# Patient Record
Sex: MALE | Race: WHITE | Employment: OTHER | URBAN - METROPOLITAN AREA
[De-identification: names, ages, dates, MRNs, and addresses within clinical notes are randomized per-mention and may not be internally consistent; named-entity substitution may affect disease eponyms.]

---

## 2017-01-13 ENCOUNTER — HOSPITAL ENCOUNTER (OUTPATIENT)
Dept: INFUSION THERAPY | Age: 77
Discharge: HOME | End: 2017-01-13
Payer: MEDICARE

## 2017-01-13 VITALS
SYSTOLIC BLOOD PRESSURE: 100 MMHG | OXYGEN SATURATION: 96 % | DIASTOLIC BLOOD PRESSURE: 56 MMHG | BODY MASS INDEX: 34.49 KG/M2 | RESPIRATION RATE: 18 BRPM | TEMPERATURE: 97.9 F | HEART RATE: 67 BPM | WEIGHT: 176.6 LBS

## 2017-01-13 DIAGNOSIS — D63.1 ANEMIA OF RENAL DISEASE: ICD-10-CM

## 2017-01-13 DIAGNOSIS — N18.9 ANEMIA OF RENAL DISEASE: ICD-10-CM

## 2017-01-13 DIAGNOSIS — E83.19 IRON OVERLOAD: ICD-10-CM

## 2017-01-13 LAB
BASOPHILS # BLD AUTO: 0 K/UL (ref 0–0.2)
BASOPHILS # BLD: 0 % (ref 0–2)
DIFFERENTIAL METHOD BLD: ABNORMAL
EOSINOPHIL # BLD: 0.2 K/UL (ref 0–0.8)
EOSINOPHIL NFR BLD: 2 % (ref 0.5–7.8)
ERYTHROCYTE [DISTWIDTH] IN BLOOD BY AUTOMATED COUNT: 13.5 % (ref 11.9–14.6)
FERRITIN SERPL-MCNC: 735 NG/ML (ref 8–388)
HCT VFR BLD AUTO: 31.8 % (ref 41.1–50.3)
HGB BLD-MCNC: 10.8 G/DL (ref 13.6–17.2)
LYMPHOCYTES # BLD AUTO: 18 % (ref 13–44)
LYMPHOCYTES # BLD: 1.4 K/UL (ref 0.5–4.6)
MCH RBC QN AUTO: 32.5 PG (ref 26.1–32.9)
MCHC RBC AUTO-ENTMCNC: 34 G/DL (ref 31.4–35)
MCV RBC AUTO: 95.8 FL (ref 79.6–97.8)
MONOCYTES # BLD: 0.6 K/UL (ref 0.1–1.3)
MONOCYTES NFR BLD AUTO: 7 % (ref 4–12)
NEUTS SEG # BLD: 5.4 K/UL (ref 1.7–8.2)
NEUTS SEG NFR BLD AUTO: 72 % (ref 43–78)
NRBC # BLD: 0 K/UL (ref 0–0.2)
PLATELET # BLD AUTO: 175 K/UL (ref 150–450)
PMV BLD AUTO: 9.7 FL (ref 10.8–14.1)
RBC # BLD AUTO: 3.32 M/UL (ref 4.23–5.67)
WBC # BLD AUTO: 7.5 K/UL (ref 4.3–11.1)

## 2017-01-13 PROCEDURE — 36415 COLL VENOUS BLD VENIPUNCTURE: CPT | Performed by: INTERNAL MEDICINE

## 2017-01-13 PROCEDURE — 85025 COMPLETE CBC W/AUTO DIFF WBC: CPT | Performed by: INTERNAL MEDICINE

## 2017-01-13 PROCEDURE — 82728 ASSAY OF FERRITIN: CPT | Performed by: INTERNAL MEDICINE

## 2017-01-13 PROCEDURE — 99195 PHLEBOTOMY: CPT

## 2017-01-13 NOTE — PROGRESS NOTES
Therapeutic Phlebotomy performed without difficulty. 450 ml of blood withdrawn. Patient tolerated well. Withdrawn blood disposed of in [employer]. Offered gingeraile as a snack. Denies lightheadedness or dizziness Patient discharged ambulatory   Instructed to notify physician of any problems, questions or concerns. Allowed opportunity for patient / family to ask questions. Verbalizes understanding. Next appointment is 03/23 at 1100 with JAYMIE.

## 2017-03-15 ENCOUNTER — HOSPITAL ENCOUNTER (OUTPATIENT)
Dept: CT IMAGING | Age: 77
Discharge: HOME | End: 2017-03-15
Attending: INTERNAL MEDICINE
Payer: MEDICARE

## 2017-03-15 VITALS
DIASTOLIC BLOOD PRESSURE: 71 MMHG | SYSTOLIC BLOOD PRESSURE: 142 MMHG | OXYGEN SATURATION: 96 % | HEART RATE: 68 BPM | RESPIRATION RATE: 14 BRPM

## 2017-03-15 DIAGNOSIS — D46.9 MYELODYSPLASTIC SYNDROME (HCC): ICD-10-CM

## 2017-03-15 LAB
BASOPHILS # BLD AUTO: 0 K/UL (ref 0–0.2)
BASOPHILS # BLD: 0 % (ref 0–2)
BONE MARROW PREP & W,BMA: NORMAL
DIFFERENTIAL METHOD BLD: ABNORMAL
EOSINOPHIL # BLD: 0.1 K/UL (ref 0–0.8)
EOSINOPHIL NFR BLD: 2 % (ref 0.5–7.8)
ERYTHROCYTE [DISTWIDTH] IN BLOOD BY AUTOMATED COUNT: 13.7 % (ref 11.9–14.6)
HCT VFR BLD AUTO: 30.1 % (ref 41.1–50.3)
HGB BLD-MCNC: 10.3 G/DL (ref 13.6–17.2)
IMM GRANULOCYTES # BLD: 0 K/UL (ref 0–0.5)
IMM GRANULOCYTES NFR BLD AUTO: 0.3 % (ref 0–5)
LYMPHOCYTES # BLD AUTO: 18 % (ref 13–44)
LYMPHOCYTES # BLD: 1.1 K/UL (ref 0.5–4.6)
MCH RBC QN AUTO: 32.6 PG (ref 26.1–32.9)
MCHC RBC AUTO-ENTMCNC: 34.2 G/DL (ref 31.4–35)
MCV RBC AUTO: 95.3 FL (ref 79.6–97.8)
MONOCYTES # BLD: 0.2 K/UL (ref 0.1–1.3)
MONOCYTES NFR BLD AUTO: 4 % (ref 4–12)
NEUTS SEG # BLD: 4.6 K/UL (ref 1.7–8.2)
NEUTS SEG NFR BLD AUTO: 76 % (ref 43–78)
PLATELET # BLD AUTO: 167 K/UL (ref 150–450)
PMV BLD AUTO: 9.9 FL (ref 10.8–14.1)
RBC # BLD AUTO: 3.16 M/UL (ref 4.23–5.67)
WBC # BLD AUTO: 6 K/UL (ref 4.3–11.1)

## 2017-03-15 PROCEDURE — 88185 FLOWCYTOMETRY/TC ADD-ON: CPT | Performed by: INTERNAL MEDICINE

## 2017-03-15 PROCEDURE — 88374 M/PHMTRC ALYS ISHQUANT/SEMIQ: CPT

## 2017-03-15 PROCEDURE — 88367 INSITU HYBRIDIZATION AUTO: CPT

## 2017-03-15 PROCEDURE — 88305 TISSUE EXAM BY PATHOLOGIST: CPT | Performed by: INTERNAL MEDICINE

## 2017-03-15 PROCEDURE — 88264 CHROMOSOME ANALYSIS 20-25: CPT

## 2017-03-15 PROCEDURE — 88237 TISSUE CULTURE BONE MARROW: CPT

## 2017-03-15 PROCEDURE — 36415 COLL VENOUS BLD VENIPUNCTURE: CPT | Performed by: INTERNAL MEDICINE

## 2017-03-15 PROCEDURE — 88312 SPECIAL STAINS GROUP 1: CPT | Performed by: INTERNAL MEDICINE

## 2017-03-15 PROCEDURE — 88342 IMHCHEM/IMCYTCHM 1ST ANTB: CPT

## 2017-03-15 PROCEDURE — 88313 SPECIAL STAINS GROUP 2: CPT | Performed by: INTERNAL MEDICINE

## 2017-03-15 PROCEDURE — 88311 DECALCIFY TISSUE: CPT | Performed by: INTERNAL MEDICINE

## 2017-03-15 PROCEDURE — 88341 IMHCHEM/IMCYTCHM EA ADD ANTB: CPT

## 2017-03-15 PROCEDURE — 85025 COMPLETE CBC W/AUTO DIFF WBC: CPT | Performed by: INTERNAL MEDICINE

## 2017-03-15 PROCEDURE — 88280 CHROMOSOME KARYOTYPE STUDY: CPT

## 2017-03-15 PROCEDURE — 38221 DX BONE MARROW BIOPSIES: CPT

## 2017-03-15 PROCEDURE — 88184 FLOWCYTOMETRY/ TC 1 MARKER: CPT | Performed by: INTERNAL MEDICINE

## 2017-03-15 PROCEDURE — 99152 MOD SED SAME PHYS/QHP 5/>YRS: CPT

## 2017-03-15 PROCEDURE — 74011250636 HC RX REV CODE- 250/636

## 2017-03-15 PROCEDURE — 88373 M/PHMTRC ALYS ISHQUANT/SEMIQ: CPT

## 2017-03-15 PROCEDURE — 74011000250 HC RX REV CODE- 250: Performed by: RADIOLOGY

## 2017-03-15 RX ORDER — LIDOCAINE HYDROCHLORIDE 20 MG/ML
2-20 INJECTION, SOLUTION INFILTRATION; PERINEURAL
Status: DISCONTINUED | OUTPATIENT
Start: 2017-03-15 | End: 2017-03-19 | Stop reason: HOSPADM

## 2017-03-15 RX ORDER — FENTANYL CITRATE 50 UG/ML
25-50 INJECTION, SOLUTION INTRAMUSCULAR; INTRAVENOUS
Status: DISCONTINUED | OUTPATIENT
Start: 2017-03-15 | End: 2017-03-19 | Stop reason: HOSPADM

## 2017-03-15 RX ORDER — MIDAZOLAM HYDROCHLORIDE 1 MG/ML
.5-2 INJECTION, SOLUTION INTRAMUSCULAR; INTRAVENOUS
Status: DISCONTINUED | OUTPATIENT
Start: 2017-03-15 | End: 2017-03-19 | Stop reason: HOSPADM

## 2017-03-15 RX ORDER — SODIUM CHLORIDE 9 MG/ML
150 INJECTION, SOLUTION INTRAVENOUS CONTINUOUS
Status: ACTIVE | OUTPATIENT
Start: 2017-03-15 | End: 2017-03-16

## 2017-03-15 RX ORDER — IBUPROFEN 600 MG/1
600 TABLET ORAL
Status: DISCONTINUED | OUTPATIENT
Start: 2017-03-15 | End: 2017-03-19 | Stop reason: HOSPADM

## 2017-03-15 RX ADMIN — FENTANYL CITRATE 50 MCG: 50 INJECTION, SOLUTION INTRAMUSCULAR; INTRAVENOUS at 13:57

## 2017-03-15 RX ADMIN — SODIUM BICARBONATE 2 ML: 0.2 INJECTION, SOLUTION INTRAVENOUS at 13:58

## 2017-03-15 RX ADMIN — MIDAZOLAM HYDROCHLORIDE 1 MG: 1 INJECTION, SOLUTION INTRAMUSCULAR; INTRAVENOUS at 13:50

## 2017-03-15 RX ADMIN — LIDOCAINE HYDROCHLORIDE 100 MG: 20 INJECTION, SOLUTION INFILTRATION; PERINEURAL at 13:58

## 2017-03-15 RX ADMIN — FENTANYL CITRATE 50 MCG: 50 INJECTION, SOLUTION INTRAMUSCULAR; INTRAVENOUS at 13:50

## 2017-03-15 RX ADMIN — MIDAZOLAM HYDROCHLORIDE 1 MG: 1 INJECTION, SOLUTION INTRAMUSCULAR; INTRAVENOUS at 13:57

## 2017-03-15 NOTE — IP AVS SNAPSHOT
[address] 
[phone] Patient: Clare Osborn MRN: [id number] You are allergic to the following Allergen Reactions Flagyl (Metronidazole) Other (comments) Pt states burning sensation all over body. Azithromycin Unknown (comments) Severe headache Gadolinium-Containing Contrast Media Other (comments) Severe pain Macrolide Antibiotics Rash Penicillins Rash Quinolones Rash Roxicodone (Oxycodone) Rash Ultram (Tramadol) Rash Recent Documentation Smoking Status Former Smoker Unresulted Labs Order Current Status CBC WITH AUTOMATED DIFF Collected (03/15/17 1413) Emergency Contacts Name Discharge Info Relation Home Work Mobile [address] [facility] CAREGIVER [3] Child [2] [phone] [facility] CAREGIVER [3] Friend [5] [phone] Gabriela Cochran   [phone] About your hospitalization You were admitted on:  March 15, 2017 You last received care in the:  D RADIOLOGY CT SCAN You were discharged on:  March 15, 2017 Unit phone number:  [phone] Why you were hospitalized Your primary diagnosis was:  Not on File Providers Seen During Your Hospitalizations Provider Role Specialty Primary office phone Catherine Mendieta MD Attending Provider Hematology and Oncology [phone] Your Primary Care Physician (PCP) Primary Care Physician Office Phone Office Fax Teddy Barboza [phone] Follow-up Information None Your Appointments Thursday March 23, 2017 11:00 AM EDT  
LAB with [address]  
[facility] OUTREACH INSURANCE [facility] Fátima Mendez [address] [place] [address]  
[phone] Thursday March 23, 2017 11:30 AM EDT Follow Up with Regino Barrios MD  
[facility] Hematology and Oncology [facility]) ADI/ Cedric Acharya [address] [facility] [address]  
[phone] Thursday March 23, 2017 12:00 PM EDT Infusion with Garland Woodard [facility]) [address] Koko Gimenez [phone] [facility] [address]  
[phone] [address] Current Discharge Medication List  
  
ASK your doctor about these medications Dose & Instructions Dispensing Information Comments Morning Noon Evening Bedtime  
 aspirin delayed-release 81 mg tablet Your last dose was: Your next dose is: Take  by mouth daily. Refills:  0  
     
   
   
   
  
 cholecalciferol 1,000 unit tablet Commonly known as:  VITAMIN D3 Your last dose was: Your next dose is: Take  by mouth daily. Refills:  0  
     
   
   
   
  
 cpap machine kit Your last dose was: Your next dose is:    
   
   
 by Does Not Apply route. 12cm Refills:  0  
     
   
   
   
  
 gabapentin 400 mg capsule Commonly known as:  NEURONTIN Your last dose was: Your next dose is:    
   
   
 1 po tid Quantity:  270 Cap Refills:  3  
     
   
   
   
  
 latanoprost 0.005 % ophthalmic solution Commonly known as:  Umpire Ridgel Your last dose was: Your next dose is:    
   
   
 Dose:  1 Drop Administer 1 Drop to both eyes nightly. Indications: OPEN ANGLE GLAUCOMA Refills:  0  
     
   
   
   
  
 lisinopril-hydroCHLOROthiazide 20-25 mg per tablet Commonly known as:  Scarlet Eaves Your last dose was: Your next dose is:    
   
   
 Dose:  1 Tab Take 1 Tab by mouth daily. Indications: HYPERTENSION Quantity:  90 Tab Refills:  3  
     
   
   
   
  
 meloxicam 15 mg tablet Commonly known as:  MOBIC Your last dose was: Your next dose is:    
   
   
 Dose:  15 mg Take 1 Tab by mouth daily. Quantity:  90 Tab Refills:  3  
     
   
   
   
  
 pramipexole 0.5 mg tablet Commonly known as:  MIRAPEX Your last dose was: Your next dose is:    
   
   
 1 po 5 times daily Quantity:  450 Tab Refills:  3  
     
   
   
   
  
 simvastatin 20 mg tablet Commonly known as:  ZOCOR Your last dose was: Your next dose is:    
   
   
 1 po qd Quantity:  90 Tab Refills:  3 Discharge Instructions [address] Department of Interventional Radiology [facility] 
[phone] Office 
[phone] Fax BIOPSY DISCHARGE INSTRUCTIONS General Instructions: A biopsy is the removal of a small piece of tissue for microscopic examination or testing. Healthy tissue can be obtained for the purpose of tissue-type matching for transplants. Unhealthy tissues are more commonly biopsied to diagnose disease. Lung Biopsy: A needle lung biopsy is performed when there is a mass discovered in the lung area. The most serious risk is infection, bleeding, and pneumothorax (a collapsed lung). Signs of pneumothorax include shortness of breath, rapid heart rate, and blueness of the skin. If any of these occur, call 911. Liver Biopsy: This test helps detect cancer, infections, and the cause of an enlargement of the liver or elevated liver enzymes. It also helps to diagnose a number of liver diseases. The pain associated with the procedure may be felt in the shoulder. The risks include internal bleeding, pneumothorax, and injury to the surrounding organs. Renal Biopsy: This procedure is sometimes done to evaluate a transplanted kidney.  It is also used to evaluate unexplained decrease in kidney function, blood, or protein in the urine. You may see bright red blood in the urine the first 24 hours after the test. If the bleeding lasts longer, you need to call your doctor. There is a risk of infection and bleeding into the muscle, which may cause soreness. Spinal Biopsy: This test is sometimes done in conjunction with other procedures. Your back will be sore, as we are taking a small sample of bone, which is slightly more difficult to sample than tissue. General Biopsy: 
   A mass can grow in any area of the body, and we are taking a specimen as ordered by your doctor. The risks are the same. They include bleeding, pain, and infection. Home Care Instructions: You may resume your regular diet and medication regimen. Do not drink alcohol, drive, or make any important legal decisions in the next 24 hours. Do not lift anything heavier than a gallon of milk until the soreness goes away. You may use over the counter acetaminophen or ibuprofen for the soreness. You may apply an ice pack to the affected area for 20-30 minutes at time for the first 24 hours. After that, you may apply a heat pack. Call If: You should call your Physician and/or the Radiology Nurse if you have any questions or concerns about the biopsy site. Call if you should have increased pain, fever, redness, drainage, or bleeding more than a small spot on the bandage. Follow-Up Instructions: Please see your ordering doctor as he/she has requested. To Reach Us: If you have any questions about your procedure, please call the Interventional Radiology department at [phone]. After business hours (5pm) and weekends, call the answering service at [phone] and ask for the Radiologist on call to be paged. Patient Signature: 
Date: 3/15/2017 Discharging Nurse: Sheri Gates RN Interventional Radiology General Nurse Discharge After general anesthesia or intravenous sedation, for 24 hours or while taking prescription Narcotics: · Limit your activities · Do not drive and operate hazardous machinery · Do not make important personal or business decisions · Do  not drink alcoholic beverages · If you have not urinated within 8 hours after discharge, please contact your surgeon on call. * Please give a list of your current medications to your Primary Care Provider. * Please update this list whenever your medications are discontinued, doses are 
   changed, or new medications (including over-the-counter products) are added. * Please carry medication information at all times in case of emergency situations. These are general instructions for a healthy lifestyle: No smoking/ No tobacco products/ Avoid exposure to second hand smoke Surgeon General's Warning:  Quitting smoking now greatly reduces serious risk to your health. Obesity, smoking, and sedentary lifestyle greatly increases your risk for illness A healthy diet, regular physical exercise & weight monitoring are important for maintaining a healthy lifestyle You may be retaining fluid if you have a history of heart failure or if you experience any of the following symptoms:  Weight gain of 3 pounds or more overnight or 5 pounds in a week, increased swelling in our hands or feet or shortness of breath while lying flat in bed. Please call your doctor as soon as you notice any of these symptoms; do not wait until your next office visit. Recognize signs and symptoms of STROKE: 
F-face looks uneven A-arms unable to move or move unevenly S-speech slurred or non-existent T-time-call 911 as soon as signs and symptoms begin-DO NOT go Back to bed or wait to see if you get better-TIME IS BRAIN. Discharge Orders None ACO Transitions of Care Introducing [facility] [address] offers a voluntary care coordination program to provide high quality service and care to [facility] fee-for-service beneficiaries. Maykel De Los Santos was designed to help you enhance your health and well-being through the following services: ? Transitions of Care  support for individuals who are transitioning from one care setting to another (example: Hospital to home). ? Chronic and Complex Care Coordination  support for individuals and caregivers of those with serious or chronic illnesses or with more than one chronic (ongoing) condition and those who take a number of different medications. If you meet specific medical criteria, a [address] [place] [address] Rd may call you directly to coordinate your care with your primary care physician and your other care providers. For questions about the [facility] programs, please, contact your physicians office. For general questions or additional information about Accountable Care Organizations: 
Please visit www.medicare.gov/acos. html or call 1-800-MEDICARE (9-840.757.7518) TTY users should call 1-460.331.8440. Introducing [facility] & HEALTH SERVICES! Dear Warner Jacobo: Thank you for requesting a [employer] account. Our records indicate that you already have an active [employer] account. You can access your account anytime at [email or web address] Did you know that you can access your hospital and ER discharge instructions at any time in [employer]? You can also review all of your test results from your hospital stay or ER visit. Additional Information If you have questions, please visit the Frequently Asked Questions section of the [employer] website at [email or web address] Remember, [employer] is NOT to be used for urgent needs. For medical emergencies, dial 911. Now available from your iPhone and Android! General Information Please provide this summary of care documentation to your next provider.  
  
  
    
    
 Patient Signature: ____________________________________________________________ Date:  ____________________________________________________________  
  
Jael Savanna Provider Signature:  ____________________________________________________________ Date:  ____________________________________________________________

## 2017-03-15 NOTE — PROGRESS NOTES
TRANSFER - OUT REPORT:    Verbal report given to Macrina BIRMINGHAM(name) on Artur Sandoval  being transferred to IR Recovery(unit) for routine progression of care       Report consisted of patients Situation, Background, Assessment and   Recommendations(SBAR). Information from the following report(s) SBAR, Procedure Summary and MAR was reviewed with the receiving nurse. Lines:   Peripheral IV 16/75/01 Left Cephalic (Active)       Peripheral IV 03/15/17 Arm (Active)        Opportunity for questions and clarification was provided.       Patient transported with:   Registered Nurse

## 2017-03-15 NOTE — H&P
Department of Interventional Radiology  [phone]    History and Physical    Patient:  Sameer Hale MRN:  [id number]  SSN:  [id number]    YOB: 1940  Age:  68 y.o. Sex:  male      Primary Care Provider:  Keoynna Zuniga MD  Referring Physician:  Luis Coley MD    Subjective:     Chief Complaint: biopsy    History of the Present Illness: The patient is a 68 y.o. male who presents for bone marrow biopsy. Anemia, MDS.  NPO. CPAP several nights a week, which he did bring with him. Past Medical History:   Diagnosis Date    Anemia     Glaucoma     HTN (hypertension)     Hypercholesterolemia     Iron overload     regular phlebotomy    Macular degeneration     Myelodysplastic syndrome (HCC)     Osteoarthritis of multiple joints     Peripheral neuropathy (HCC) 11/1/2010    RLS (restless legs syndrome)     Sleep apnea     Spondylolisthesis, acquired 12/5/2010     Past Surgical History:   Procedure Laterality Date    HX CARPAL TUNNEL RELEASE Right 2014    HX CERVICAL DISKECTOMY  5/2002    HX CERVICAL FUSION  2002 & 2011    cervical plate placement     HX CHOLECYSTECTOMY  4/17/14    HX COLONOSCOPY  2001/2008    HX LUMBAR LAMINECTOMY  9/2/10    lumbar laminectomy L4 x 2/ rods    HX ORTHOPAEDIC Right 1985    ankle fusion with hardware     HX ORTHOPAEDIC Left 2/14/2011    fusion of bones in foot    HX ORTHOPAEDIC  1/2013    rods in back     HX THORACIC LAMINECTOMY  4/2/09    T12, L2,L3    NEUROLOGICAL PROCEDURE UNLISTED  2013    T10-T12 fusion    TOTAL HIP ARTHROPLASTY Bilateral     R in 2003, l in 2014   [address] Left 12/8/2003    TOTAL KNEE ARTHROPLASTY Right 12/7/2009        Review of Systems:    Pertinent items are noted in the History of Present Illness. Current Outpatient Prescriptions   Medication Sig    pramipexole (MIRAPEX) 0.5 mg tablet 1 po 5 times daily    meloxicam (MOBIC) 15 mg tablet Take 1 Tab by mouth daily.     lisinopril-hydrochlorothiazide (ZESTORETIC) 20-25 mg per tablet Take 1 Tab by mouth daily. Indications: HYPERTENSION    simvastatin (ZOCOR) 20 mg tablet 1 po qd    gabapentin (NEURONTIN) 400 mg capsule 1 po tid    cholecalciferol (VITAMIN D3) 1,000 unit tablet Take  by mouth daily.  cpap machine kit by Does Not Apply route. 12cm    aspirin delayed-release 81 mg tablet Take  by mouth daily.  latanoprost (XALATAN) 0.005 % ophthalmic solution Administer 1 Drop to both eyes nightly. Indications: OPEN ANGLE GLAUCOMA     No current facility-administered medications for this encounter. Allergies   Allergen Reactions    Flagyl [Metronidazole] Other (comments)     Pt states burning sensation all over body.  Azithromycin Unknown (comments)     Severe headache    Gadolinium-Containing Contrast Media Other (comments)     Severe pain    Macrolide Antibiotics Rash    Penicillins Rash    Quinolones Rash    Roxicodone [Oxycodone] Rash    Ultram [Tramadol] Rash       Family History   Problem Relation Age of Onset    Heart Disease Mother      carotid enarterectomy    Cancer Mother      hx of lymph node    Arthritis-osteo Brother     Lung Disease Brother      COPD    Cancer Father      prostate     Social History   Substance Use Topics    Smoking status: Former Smoker     Packs/day: 1.00     Years: 8.00     Quit date: 1/1/1967    Smokeless tobacco: Never Used    Alcohol use 7.0 oz/week     14 Cans of beer per week      Comment: week        Objective:       Physical Examination:    There were no vitals filed for this visit. There were no vitals taken for this visit.   HEART: regular rate and rhythm  LUNG: clear to auscultation bilaterally  ABDOMEN: normal findings: soft, non-tender  EXTREMITIES: warm, no edema    Laboratory:     Lab Results   Component Value Date/Time    Sodium 139 01/11/2017 10:58 AM    Sodium 136 10/31/2016 02:16 PM    Potassium 4.9 01/11/2017 10:58 AM    Potassium 3.9 10/31/2016 02:16 PM    Chloride 98 01/11/2017 10:58 AM    Chloride 105 10/31/2016 02:16 PM    CO2 23 01/11/2017 10:58 AM    CO2 28 10/31/2016 02:16 PM    Anion gap 3 10/31/2016 02:16 PM    Anion gap 6 07/29/2016 10:47 AM    Glucose 104 01/11/2017 10:58 AM    Glucose 126 10/31/2016 02:16 PM    BUN 26 01/11/2017 10:58 AM    BUN 25 10/31/2016 02:16 PM    Creatinine 0.95 01/11/2017 10:58 AM    Creatinine 0.94 10/31/2016 02:16 PM    GFR est AA 90 01/11/2017 10:58 AM    GFR est AA >60 10/31/2016 02:16 PM    GFR est non-AA 77 01/11/2017 10:58 AM    GFR est non-AA >60 10/31/2016 02:16 PM    Calcium 9.5 01/11/2017 10:58 AM    Calcium 9.1 10/31/2016 02:16 PM    Magnesium 2.0 10/31/2016 02:16 PM    Magnesium 2.1 07/29/2016 10:47 AM    Phosphorus 3.2 02/23/2015 10:45 AM    Phosphorus 3.3 09/09/2014 11:00 AM    Albumin 4.8 01/11/2017 10:58 AM    Albumin 4.2 10/31/2016 02:16 PM    Protein, total 6.9 01/11/2017 10:58 AM    Protein, total 7.1 10/31/2016 02:16 PM    Globulin 2.9 10/31/2016 02:16 PM    Globulin 2.9 07/29/2016 10:47 AM    A-G Ratio 2.3 01/11/2017 10:58 AM    A-G Ratio 1.4 10/31/2016 02:16 PM    AST (SGOT) 29 01/11/2017 10:58 AM    AST (SGOT) 23 10/31/2016 02:16 PM    ALT (SGPT) 24 01/11/2017 10:58 AM    ALT (SGPT) 30 10/31/2016 02:16 PM     Lab Results   Component Value Date/Time    WBC 7.5 01/13/2017 09:53 AM    WBC 6.4 01/11/2017 09:23 AM    HGB 10.8 01/13/2017 09:53 AM    HGB 11.3 01/11/2017 09:23 AM    HCT 31.8 01/13/2017 09:53 AM    HCT 33.2 01/11/2017 09:23 AM    PLATELET 594 42/96/1165 09:53 AM    PLATELET 394 80/74/4724 09:23 AM     Lab Results   Component Value Date/Time    aPTT 31.7 07/02/2014 01:45 PM    aPTT 27.5 11/09/2009 10:45 AM    Prothrombin time 10.9 07/02/2014 01:45 PM    Prothrombin time 11.3 12/10/2009 04:55 AM    INR 1.0 07/02/2014 01:45 PM    INR 1.1 12/10/2009 04:55 AM       Assessment:     Anemia, MDS    Plan:     Planned Procedure:  Bone marrow biopsy    Risks, benefits, and alternatives reviewed with patient and he agrees to proceed with the procedure.       Signed By: Modesto Ríos PA-C     March 15, 2017

## 2017-03-15 NOTE — DISCHARGE INSTRUCTIONS
[address]  Department of Interventional Radiology  [facility]  [phone] Office  [phone] Fax    BIOPSY DISCHARGE INSTRUCTIONS    General Instructions:     A biopsy is the removal of a small piece of tissue for microscopic examination or testing. Healthy tissue can be obtained for the purpose of tissue-type matching for transplants. Unhealthy tissues are more commonly biopsied to diagnose disease. Lung Biopsy:     A needle lung biopsy is performed when there is a mass discovered in the lung area. The most serious risk is infection, bleeding, and pneumothorax (a collapsed lung). Signs of pneumothorax include shortness of breath, rapid heart rate, and blueness of the skin. If any of these occur, call 911. Liver Biopsy: This test helps detect cancer, infections, and the cause of an enlargement of the liver or elevated liver enzymes. It also helps to diagnose a number of liver diseases. The pain associated with the procedure may be felt in the shoulder. The risks include internal bleeding, pneumothorax, and injury to the surrounding organs. Renal Biopsy: This procedure is sometimes done to evaluate a transplanted kidney. It is also used to evaluate unexplained decrease in kidney function, blood, or protein in the urine. You may see bright red blood in the urine the first 24 hours after the test. If the bleeding lasts longer, you need to call your doctor. There is a risk of infection and bleeding into the muscle, which may cause soreness. Spinal Biopsy: This test is sometimes done in conjunction with other procedures. Your back will be sore, as we are taking a small sample of bone, which is slightly more difficult to sample than tissue. General Biopsy:     A mass can grow in any area of the body, and we are taking a specimen as ordered by your doctor. The risks are the same.  They include bleeding, pain, and infection. Home Care Instructions: You may resume your regular diet and medication regimen. Do not drink alcohol, drive, or make any important legal decisions in the next 24 hours. Do not lift anything heavier than a gallon of milk until the soreness goes away. You may use over the counter acetaminophen or ibuprofen for the soreness. You may apply an ice pack to the affected area for 20-30 minutes at time for the first 24 hours. After that, you may apply a heat pack. Call If: You should call your Physician and/or the Radiology Nurse if you have any questions or concerns about the biopsy site. Call if you should have increased pain, fever, redness, drainage, or bleeding more than a small spot on the bandage. Follow-Up Instructions: Please see your ordering doctor as he/she has requested. To Reach Us: If you have any questions about your procedure, please call the Interventional Radiology department at [phone]. After business hours (5pm) and weekends, call the answering service at [phone] and ask for the Radiologist on call to be paged. Patient Signature:  Date: 3/15/2017  Discharging Nurse: Allison Green RN      Interventional Radiology General Nurse Discharge    After general anesthesia or intravenous sedation, for 24 hours or while taking prescription Narcotics:  · Limit your activities  · Do not drive and operate hazardous machinery  · Do not make important personal or business decisions  · Do  not drink alcoholic beverages  · If you have not urinated within 8 hours after discharge, please contact your surgeon on call. * Please give a list of your current medications to your Primary Care Provider. * Please update this list whenever your medications are discontinued, doses are     changed, or new medications (including over-the-counter products) are added. * Please carry medication information at all times in case of emergency situations.     These are general instructions for a healthy lifestyle:    No smoking/ No tobacco products/ Avoid exposure to second hand smoke  Surgeon General's Warning:  Quitting smoking now greatly reduces serious risk to your health. Obesity, smoking, and sedentary lifestyle greatly increases your risk for illness  A healthy diet, regular physical exercise & weight monitoring are important for maintaining a healthy lifestyle    You may be retaining fluid if you have a history of heart failure or if you experience any of the following symptoms:  Weight gain of 3 pounds or more overnight or 5 pounds in a week, increased swelling in our hands or feet or shortness of breath while lying flat in bed. Please call your doctor as soon as you notice any of these symptoms; do not wait until your next office visit. Recognize signs and symptoms of STROKE:  F-face looks uneven    A-arms unable to move or move unevenly    S-speech slurred or non-existent    T-time-call 911 as soon as signs and symptoms begin-DO NOT go       Back to bed or wait to see if you get better-TIME IS BRAIN.

## 2017-03-15 NOTE — PROGRESS NOTES
Patient to CT room for procedure. Patient awake and alert, verbalized name, , and procedure to be performed. Patient moved to procedure table independently.

## 2017-03-15 NOTE — PROCEDURES
Interventional Radiology Brief Procedure Note    Patient: Celsa Perry MRN: [id number]  SSN: [id number]    YOB: 1940  Age: 68 y.o. Sex: male      Date of Procedure: 3/15/2017    Pre-Procedure Diagnosis: Myelodysplastic syndrome. Hemochromatosis. Post-Procedure Diagnosis: SAME    Procedure(s): Image Guided Biopsy--right iliac bone marrow aspiration and biopsy    Brief Description of Procedure: as above    Performed By: Cain Triana MD     Assistants: None    Anesthesia: Moderate Sedation    Estimated Blood Loss: Less than 10ml    Specimens: Pathology    Implants: None    Findings: Sclerotic bone. Complications: None    Recommendations: 1 hour bedrest.       Follow Up: Dr Canales Jurjozef.     Signed By: Cain Triana MD     March 15, 2017

## 2017-03-15 NOTE — PROGRESS NOTES
Recovery period without difficulty. Pt alert and oriented and denies pain. Dressing is clean, dry, and intact. Reviewed discharge instructions with patient and friend, both verbalized understanding. [facility] Vital signs and Hermelindo score completed.

## 2017-03-23 ENCOUNTER — HOSPITAL ENCOUNTER (OUTPATIENT)
Dept: LAB | Age: 77
Discharge: HOME | End: 2017-03-23
Payer: MEDICARE

## 2017-03-23 ENCOUNTER — HOSPITAL ENCOUNTER (OUTPATIENT)
Dept: INFUSION THERAPY | Age: 77
Discharge: HOME | End: 2017-03-23
Payer: MEDICARE

## 2017-03-23 VITALS
OXYGEN SATURATION: 94 % | TEMPERATURE: 97.4 F | DIASTOLIC BLOOD PRESSURE: 68 MMHG | RESPIRATION RATE: 18 BRPM | HEART RATE: 67 BPM | SYSTOLIC BLOOD PRESSURE: 121 MMHG

## 2017-03-23 DIAGNOSIS — E83.19 IRON OVERLOAD: ICD-10-CM

## 2017-03-23 DIAGNOSIS — D46.9 MYELODYSPLASTIC SYNDROME (HCC): ICD-10-CM

## 2017-03-23 LAB
ALBUMIN SERPL BCP-MCNC: 3.9 G/DL (ref 3.2–4.6)
ALBUMIN/GLOB SERPL: 1.4 {RATIO} (ref 1.2–3.5)
ALP SERPL-CCNC: 81 U/L (ref 50–136)
ALT SERPL-CCNC: 26 U/L (ref 12–65)
ANION GAP BLD CALC-SCNC: 8 MMOL/L (ref 7–16)
AST SERPL W P-5'-P-CCNC: 18 U/L (ref 15–37)
BASOPHILS # BLD AUTO: 0 K/UL (ref 0–0.2)
BASOPHILS # BLD: 0 % (ref 0–2)
BILIRUB SERPL-MCNC: 0.3 MG/DL (ref 0.2–1.1)
BUN SERPL-MCNC: 24 MG/DL (ref 8–23)
CALCIUM SERPL-MCNC: 8.9 MG/DL (ref 8.3–10.4)
CHLORIDE SERPL-SCNC: 101 MMOL/L (ref 98–107)
CO2 SERPL-SCNC: 27 MMOL/L (ref 23–32)
CREAT SERPL-MCNC: 0.95 MG/DL (ref 0.8–1.5)
DIFFERENTIAL METHOD BLD: ABNORMAL
EOSINOPHIL # BLD: 0.1 K/UL (ref 0–0.8)
EOSINOPHIL NFR BLD: 2 % (ref 0.5–7.8)
ERYTHROCYTE [DISTWIDTH] IN BLOOD BY AUTOMATED COUNT: 13.4 % (ref 11.9–14.6)
FERRITIN SERPL-MCNC: 714 NG/ML (ref 8–388)
GLOBULIN SER CALC-MCNC: 2.8 G/DL (ref 2.3–3.5)
GLUCOSE SERPL-MCNC: 109 MG/DL (ref 65–100)
HCT VFR BLD AUTO: 29.9 % (ref 41.1–50.3)
HGB BLD-MCNC: 10.4 G/DL (ref 13.6–17.2)
LYMPHOCYTES # BLD AUTO: 19 % (ref 13–44)
LYMPHOCYTES # BLD: 1 K/UL (ref 0.5–4.6)
MCH RBC QN AUTO: 33.4 PG (ref 26.1–32.9)
MCHC RBC AUTO-ENTMCNC: 34.8 G/DL (ref 31.4–35)
MCV RBC AUTO: 96.1 FL (ref 79.6–97.8)
MONOCYTES # BLD: 0.4 K/UL (ref 0.1–1.3)
MONOCYTES NFR BLD AUTO: 7 % (ref 4–12)
NEUTS SEG # BLD: 3.7 K/UL (ref 1.7–8.2)
NEUTS SEG NFR BLD AUTO: 72 % (ref 43–78)
NRBC # BLD: 0 K/UL (ref 0–0.2)
PLATELET # BLD AUTO: 162 K/UL (ref 150–450)
PMV BLD AUTO: 10.1 FL (ref 10.8–14.1)
POTASSIUM SERPL-SCNC: 4.1 MMOL/L (ref 3.5–5.1)
PROT SERPL-MCNC: 6.7 G/DL (ref 6.3–8.2)
RBC # BLD AUTO: 3.11 M/UL (ref 4.23–5.67)
SODIUM SERPL-SCNC: 136 MMOL/L (ref 136–145)
WBC # BLD AUTO: 5.2 K/UL (ref 4.3–11.1)

## 2017-03-23 PROCEDURE — 99195 PHLEBOTOMY: CPT

## 2017-03-23 NOTE — PROGRESS NOTES
Arrived to the [facility]. Therapeutic Phlebotomy completed. Patient tolerated well. Any issues or concerns during appointment: Patient's hgb 10.4. Spoke with Jami Pereira RN to Dr. Cristy Saavedra, and per Dr. Cristy Saavedra OK to proceed with therapeutic phlebotomy today with current hgb. Pt made aware and encouraged to call with any new symptoms at home. VSS. Patient aware of next infusion appointment on 5/18/17 at 1500. Discharged ambulatory.

## 2017-05-18 ENCOUNTER — HOSPITAL ENCOUNTER (OUTPATIENT)
Dept: INFUSION THERAPY | Age: 77
Discharge: HOME | End: 2017-05-18
Payer: MEDICARE

## 2017-05-18 VITALS
SYSTOLIC BLOOD PRESSURE: 121 MMHG | WEIGHT: 171.4 LBS | TEMPERATURE: 99 F | DIASTOLIC BLOOD PRESSURE: 64 MMHG | RESPIRATION RATE: 18 BRPM | BODY MASS INDEX: 35.21 KG/M2 | HEART RATE: 77 BPM | OXYGEN SATURATION: 95 %

## 2017-05-18 DIAGNOSIS — E83.19 IRON OVERLOAD: ICD-10-CM

## 2017-05-18 DIAGNOSIS — D63.1 ANEMIA OF RENAL DISEASE: ICD-10-CM

## 2017-05-18 DIAGNOSIS — N18.9 ANEMIA OF RENAL DISEASE: ICD-10-CM

## 2017-05-18 LAB
BASOPHILS # BLD AUTO: 0 K/UL (ref 0–0.2)
BASOPHILS # BLD: 0 % (ref 0–2)
DIFFERENTIAL METHOD BLD: ABNORMAL
EOSINOPHIL # BLD: 0.1 K/UL (ref 0–0.8)
EOSINOPHIL NFR BLD: 2 % (ref 0.5–7.8)
ERYTHROCYTE [DISTWIDTH] IN BLOOD BY AUTOMATED COUNT: 13.3 % (ref 11.9–14.6)
FERRITIN SERPL-MCNC: 599 NG/ML (ref 8–388)
HCT VFR BLD AUTO: 29.3 % (ref 41.1–50.3)
HGB BLD-MCNC: 10.2 G/DL (ref 13.6–17.2)
LYMPHOCYTES # BLD AUTO: 18 % (ref 13–44)
LYMPHOCYTES # BLD: 1.1 K/UL (ref 0.5–4.6)
MCH RBC QN AUTO: 33.1 PG (ref 26.1–32.9)
MCHC RBC AUTO-ENTMCNC: 34.8 G/DL (ref 31.4–35)
MCV RBC AUTO: 95.1 FL (ref 79.6–97.8)
MONOCYTES # BLD: 0.5 K/UL (ref 0.1–1.3)
MONOCYTES NFR BLD AUTO: 8 % (ref 4–12)
NEUTS SEG # BLD: 4.6 K/UL (ref 1.7–8.2)
NEUTS SEG NFR BLD AUTO: 73 % (ref 43–78)
NRBC # BLD: 0.01 K/UL (ref 0–0.2)
PLATELET # BLD AUTO: 178 K/UL (ref 150–450)
PMV BLD AUTO: 10.1 FL (ref 10.8–14.1)
RBC # BLD AUTO: 3.08 M/UL (ref 4.23–5.67)
WBC # BLD AUTO: 6.3 K/UL (ref 4.3–11.1)

## 2017-05-18 PROCEDURE — 82728 ASSAY OF FERRITIN: CPT | Performed by: INTERNAL MEDICINE

## 2017-05-18 PROCEDURE — 85025 COMPLETE CBC W/AUTO DIFF WBC: CPT | Performed by: INTERNAL MEDICINE

## 2017-05-18 PROCEDURE — 99211 OFF/OP EST MAY X REQ PHY/QHP: CPT

## 2017-05-18 NOTE — PROGRESS NOTES
Pt orthostatic upon arrival, labs drawn peripherally,recehcked VS when labs were resulted,  notified MD of VS and results, verbal order to hold phleb.  Today for hgb 10.2  Encouraged pt to drink plenty of fluids, verbalized understanding, to follow up with MD in July, discharged ambulatory

## 2017-07-11 ENCOUNTER — HOSPITAL ENCOUNTER (OUTPATIENT)
Dept: INFUSION THERAPY | Age: 77
Discharge: HOME | End: 2017-07-11
Payer: MEDICARE

## 2017-07-11 ENCOUNTER — HOSPITAL ENCOUNTER (OUTPATIENT)
Dept: LAB | Age: 77
Discharge: HOME | End: 2017-07-11
Payer: MEDICARE

## 2017-07-11 VITALS
DIASTOLIC BLOOD PRESSURE: 58 MMHG | RESPIRATION RATE: 18 BRPM | HEART RATE: 68 BPM | SYSTOLIC BLOOD PRESSURE: 122 MMHG | OXYGEN SATURATION: 97 % | TEMPERATURE: 98.2 F

## 2017-07-11 DIAGNOSIS — D46.9 MYELODYSPLASTIC SYNDROME (HCC): ICD-10-CM

## 2017-07-11 DIAGNOSIS — E83.19 IRON OVERLOAD: ICD-10-CM

## 2017-07-11 LAB
ALBUMIN SERPL BCP-MCNC: 4.1 G/DL (ref 3.2–4.6)
ALBUMIN/GLOB SERPL: 1.4 {RATIO} (ref 1.2–3.5)
ALP SERPL-CCNC: 80 U/L (ref 50–136)
ALT SERPL-CCNC: 30 U/L (ref 12–65)
ANION GAP BLD CALC-SCNC: 8 MMOL/L (ref 7–16)
AST SERPL W P-5'-P-CCNC: 24 U/L (ref 15–37)
BASOPHILS # BLD AUTO: 0 K/UL (ref 0–0.2)
BASOPHILS # BLD: 0 % (ref 0–2)
BILIRUB SERPL-MCNC: 0.4 MG/DL (ref 0.2–1.1)
BUN SERPL-MCNC: 20 MG/DL (ref 8–23)
CALCIUM SERPL-MCNC: 9 MG/DL (ref 8.3–10.4)
CHLORIDE SERPL-SCNC: 104 MMOL/L (ref 98–107)
CO2 SERPL-SCNC: 26 MMOL/L (ref 21–32)
CREAT SERPL-MCNC: 0.98 MG/DL (ref 0.8–1.5)
DIFFERENTIAL METHOD BLD: ABNORMAL
EOSINOPHIL # BLD: 0.2 K/UL (ref 0–0.8)
EOSINOPHIL NFR BLD: 2 % (ref 0.5–7.8)
ERYTHROCYTE [DISTWIDTH] IN BLOOD BY AUTOMATED COUNT: 13.4 % (ref 11.9–14.6)
FERRITIN SERPL-MCNC: 583 NG/ML (ref 8–388)
GLOBULIN SER CALC-MCNC: 2.9 G/DL (ref 2.3–3.5)
GLUCOSE SERPL-MCNC: 100 MG/DL (ref 65–100)
HCT VFR BLD AUTO: 30.9 % (ref 41.1–50.3)
HGB BLD-MCNC: 10.6 G/DL (ref 13.6–17.2)
LYMPHOCYTES # BLD AUTO: 13 % (ref 13–44)
LYMPHOCYTES # BLD: 0.9 K/UL (ref 0.5–4.6)
MCH RBC QN AUTO: 32.8 PG (ref 26.1–32.9)
MCHC RBC AUTO-ENTMCNC: 34.3 G/DL (ref 31.4–35)
MCV RBC AUTO: 95.7 FL (ref 79.6–97.8)
MONOCYTES # BLD: 0.6 K/UL (ref 0.1–1.3)
MONOCYTES NFR BLD AUTO: 8 % (ref 4–12)
NEUTS SEG # BLD: 5.4 K/UL (ref 1.7–8.2)
NEUTS SEG NFR BLD AUTO: 77 % (ref 43–78)
NRBC # BLD: 0 K/UL (ref 0–0.2)
PLATELET # BLD AUTO: 172 K/UL (ref 150–450)
PMV BLD AUTO: 10 FL (ref 10.8–14.1)
POTASSIUM SERPL-SCNC: 4.2 MMOL/L (ref 3.5–5.1)
PROT SERPL-MCNC: 7 G/DL (ref 6.3–8.2)
RBC # BLD AUTO: 3.23 M/UL (ref 4.23–5.67)
SODIUM SERPL-SCNC: 138 MMOL/L (ref 136–145)
WBC # BLD AUTO: 7.1 K/UL (ref 4.3–11.1)

## 2017-07-11 PROCEDURE — 99195 PHLEBOTOMY: CPT

## 2017-07-11 NOTE — PROGRESS NOTES
Pt arrived ambulatory after OV for phlebotomy, he denies complaints. Phlebotomy set used, IV access to right forearm obtained. Pt tolerated procedure without incident. Pt dc'd stable after 30 minute obs, to return to [facility] on 9/11 at 1115.

## 2017-09-08 ENCOUNTER — HOSPITAL ENCOUNTER (OUTPATIENT)
Dept: INFUSION THERAPY | Age: 77
Discharge: HOME | End: 2017-09-08
Payer: MEDICARE

## 2017-09-08 VITALS — DIASTOLIC BLOOD PRESSURE: 75 MMHG | SYSTOLIC BLOOD PRESSURE: 137 MMHG | HEART RATE: 70 BPM

## 2017-09-08 DIAGNOSIS — N18.9 ANEMIA OF RENAL DISEASE: ICD-10-CM

## 2017-09-08 DIAGNOSIS — D63.1 ANEMIA OF RENAL DISEASE: ICD-10-CM

## 2017-09-08 DIAGNOSIS — E83.19 IRON OVERLOAD: ICD-10-CM

## 2017-09-08 LAB
FERRITIN SERPL-MCNC: 502 NG/ML (ref 8–388)
HGB BLD-MCNC: 10.6 G/DL (ref 13.6–17.2)

## 2017-09-08 PROCEDURE — 99195 PHLEBOTOMY: CPT

## 2017-09-08 PROCEDURE — 82728 ASSAY OF FERRITIN: CPT | Performed by: INTERNAL MEDICINE

## 2017-09-08 PROCEDURE — 36415 COLL VENOUS BLD VENIPUNCTURE: CPT | Performed by: INTERNAL MEDICINE

## 2017-09-08 PROCEDURE — 85018 HEMOGLOBIN: CPT | Performed by: INTERNAL MEDICINE

## 2017-09-08 NOTE — PROGRESS NOTES
Arrived to the [facility]. Therapeutic phlebotomy completed. Patient tolerated well. Any issues or concerns during appointment: none. Patient aware of next infusion appointment on 11/13/17 @ 1083. Discharged home.

## 2017-09-11 ENCOUNTER — APPOINTMENT (OUTPATIENT)
Dept: INFUSION THERAPY | Age: 77
End: 2017-09-11

## 2017-11-13 ENCOUNTER — HOSPITAL ENCOUNTER (OUTPATIENT)
Dept: INFUSION THERAPY | Age: 77
Discharge: HOME | End: 2017-11-13
Payer: MEDICARE

## 2017-11-13 ENCOUNTER — HOSPITAL ENCOUNTER (OUTPATIENT)
Dept: LAB | Age: 77
Discharge: HOME | End: 2017-11-13
Payer: MEDICARE

## 2017-11-13 VITALS
DIASTOLIC BLOOD PRESSURE: 62 MMHG | SYSTOLIC BLOOD PRESSURE: 110 MMHG | HEART RATE: 70 BPM | RESPIRATION RATE: 18 BRPM | OXYGEN SATURATION: 98 %

## 2017-11-13 DIAGNOSIS — N18.9 ANEMIA OF RENAL DISEASE: ICD-10-CM

## 2017-11-13 DIAGNOSIS — D63.1 ANEMIA OF RENAL DISEASE: ICD-10-CM

## 2017-11-13 DIAGNOSIS — E83.19 IRON OVERLOAD: ICD-10-CM

## 2017-11-13 DIAGNOSIS — D46.9 MYELODYSPLASTIC SYNDROME (HCC): ICD-10-CM

## 2017-11-13 LAB
ALBUMIN SERPL-MCNC: 4.1 G/DL (ref 3.2–4.6)
ALBUMIN/GLOB SERPL: 1.5 {RATIO} (ref 1.2–3.5)
ALP SERPL-CCNC: 88 U/L (ref 50–136)
ALT SERPL-CCNC: 31 U/L (ref 12–65)
ANION GAP SERPL CALC-SCNC: 7 MMOL/L (ref 7–16)
AST SERPL-CCNC: 20 U/L (ref 15–37)
BASOPHILS # BLD: 0 K/UL (ref 0–0.2)
BASOPHILS NFR BLD: 0 % (ref 0–2)
BILIRUB SERPL-MCNC: 0.4 MG/DL (ref 0.2–1.1)
BUN SERPL-MCNC: 32 MG/DL (ref 8–23)
CALCIUM SERPL-MCNC: 9.3 MG/DL (ref 8.3–10.4)
CHLORIDE SERPL-SCNC: 103 MMOL/L (ref 98–107)
CO2 SERPL-SCNC: 28 MMOL/L (ref 21–32)
CREAT SERPL-MCNC: 1.03 MG/DL (ref 0.8–1.5)
DIFFERENTIAL METHOD BLD: ABNORMAL
EOSINOPHIL # BLD: 0.1 K/UL (ref 0–0.8)
EOSINOPHIL NFR BLD: 2 % (ref 0.5–7.8)
ERYTHROCYTE [DISTWIDTH] IN BLOOD BY AUTOMATED COUNT: 13.6 % (ref 11.9–14.6)
FERRITIN SERPL-MCNC: 454 NG/ML (ref 8–388)
GLOBULIN SER CALC-MCNC: 2.7 G/DL (ref 2.3–3.5)
GLUCOSE SERPL-MCNC: 107 MG/DL (ref 65–100)
HCT VFR BLD AUTO: 30.4 % (ref 41.1–50.3)
HGB BLD-MCNC: 10.5 G/DL (ref 13.6–17.2)
LYMPHOCYTES # BLD: 1.1 K/UL (ref 0.5–4.6)
LYMPHOCYTES NFR BLD: 17 % (ref 13–44)
MCH RBC QN AUTO: 32.9 PG (ref 26.1–32.9)
MCHC RBC AUTO-ENTMCNC: 34.5 G/DL (ref 31.4–35)
MCV RBC AUTO: 95.3 FL (ref 79.6–97.8)
MONOCYTES # BLD: 0.6 K/UL (ref 0.1–1.3)
MONOCYTES NFR BLD: 9 % (ref 4–12)
NEUTS SEG # BLD: 4.7 K/UL (ref 1.7–8.2)
NEUTS SEG NFR BLD: 72 % (ref 43–78)
NRBC # BLD: 0.01 K/UL (ref 0–0.2)
NRBC BLD-RTO: 0.2 PER 100 WBC (ref 0–2)
PLATELET # BLD AUTO: 174 K/UL (ref 150–450)
PMV BLD AUTO: 9.8 FL (ref 10.8–14.1)
POTASSIUM SERPL-SCNC: 4.7 MMOL/L (ref 3.5–5.1)
PROT SERPL-MCNC: 6.8 G/DL (ref 6.3–8.2)
RBC # BLD AUTO: 3.19 M/UL (ref 4.23–5.67)
SODIUM SERPL-SCNC: 138 MMOL/L (ref 136–145)
WBC # BLD AUTO: 6.5 K/UL (ref 4.3–11.1)

## 2017-11-13 PROCEDURE — 99195 PHLEBOTOMY: CPT

## 2017-11-13 NOTE — PROGRESS NOTES
Arrived to the [facility]. Therapeutic phlebotomy completed. Patient tolerated well. Any issues or concerns during appointment: none. Patient aware of next infusion appointment on 1/15/18 at 0800. Discharged ambulatory.     Helena Crowley RN

## 2018-01-15 ENCOUNTER — HOSPITAL ENCOUNTER (OUTPATIENT)
Dept: INFUSION THERAPY | Age: 78
Discharge: HOME | End: 2018-01-15
Payer: MEDICARE

## 2018-01-15 VITALS
HEART RATE: 79 BPM | RESPIRATION RATE: 18 BRPM | SYSTOLIC BLOOD PRESSURE: 155 MMHG | DIASTOLIC BLOOD PRESSURE: 82 MMHG | TEMPERATURE: 97.7 F | OXYGEN SATURATION: 94 %

## 2018-01-15 DIAGNOSIS — N18.9 ANEMIA OF RENAL DISEASE: ICD-10-CM

## 2018-01-15 DIAGNOSIS — E83.19 IRON OVERLOAD: ICD-10-CM

## 2018-01-15 DIAGNOSIS — D63.1 ANEMIA OF RENAL DISEASE: ICD-10-CM

## 2018-01-15 LAB
BASOPHILS # BLD: 0 K/UL (ref 0–0.2)
BASOPHILS NFR BLD: 0 % (ref 0–2)
DIFFERENTIAL METHOD BLD: ABNORMAL
EOSINOPHIL # BLD: 0.1 K/UL (ref 0–0.8)
EOSINOPHIL NFR BLD: 2 % (ref 0.5–7.8)
ERYTHROCYTE [DISTWIDTH] IN BLOOD BY AUTOMATED COUNT: 13.8 % (ref 11.9–14.6)
FERRITIN SERPL-MCNC: 282 NG/ML (ref 8–388)
HCT VFR BLD AUTO: 31.1 % (ref 41.1–50.3)
HGB BLD-MCNC: 10.6 G/DL (ref 13.6–17.2)
LYMPHOCYTES # BLD: 1 K/UL (ref 0.5–4.6)
LYMPHOCYTES NFR BLD: 15 % (ref 13–44)
MCH RBC QN AUTO: 32.6 PG (ref 26.1–32.9)
MCHC RBC AUTO-ENTMCNC: 34.1 G/DL (ref 31.4–35)
MCV RBC AUTO: 95.7 FL (ref 79.6–97.8)
MONOCYTES # BLD: 0.6 K/UL (ref 0.1–1.3)
MONOCYTES NFR BLD: 9 % (ref 4–12)
NEUTS SEG # BLD: 4.7 K/UL (ref 1.7–8.2)
NEUTS SEG NFR BLD: 74 % (ref 43–78)
NRBC # BLD: 0 K/UL (ref 0–0.2)
PLATELET # BLD AUTO: 151 K/UL (ref 150–450)
PMV BLD AUTO: 10.1 FL (ref 10.8–14.1)
RBC # BLD AUTO: 3.25 M/UL (ref 4.23–5.67)
WBC # BLD AUTO: 6.4 K/UL (ref 4.3–11.1)

## 2018-01-15 PROCEDURE — 85025 COMPLETE CBC W/AUTO DIFF WBC: CPT | Performed by: INTERNAL MEDICINE

## 2018-01-15 PROCEDURE — 36415 COLL VENOUS BLD VENIPUNCTURE: CPT | Performed by: INTERNAL MEDICINE

## 2018-01-15 PROCEDURE — 99195 PHLEBOTOMY: CPT

## 2018-01-15 PROCEDURE — 82728 ASSAY OF FERRITIN: CPT | Performed by: INTERNAL MEDICINE

## 2018-01-15 NOTE — PROGRESS NOTES
Arrived to the [facility]. Phlebotomy complete, 500 ml Whole Blood removed from left AC. Patient with bruise from previous stick at PCP office but requests to use left arm.  Patient tolerated without problems, drank a Sprite, denies dizziness, VSS  Any issues or concerns during appointment: no  Patient aware of next infusion appointment on 3/19/18 at 1000  Discharged ambulatory

## 2018-03-12 ENCOUNTER — APPOINTMENT (OUTPATIENT)
Dept: INFUSION THERAPY | Age: 78
End: 2018-03-12
Payer: MEDICARE

## 2018-03-19 ENCOUNTER — HOSPITAL ENCOUNTER (OUTPATIENT)
Dept: LAB | Age: 78
Discharge: HOME | End: 2018-03-19
Payer: MEDICARE

## 2018-03-19 ENCOUNTER — HOSPITAL ENCOUNTER (OUTPATIENT)
Dept: INFUSION THERAPY | Age: 78
Discharge: HOME | End: 2018-03-19
Payer: MEDICARE

## 2018-03-19 VITALS — HEART RATE: 71 BPM | DIASTOLIC BLOOD PRESSURE: 74 MMHG | SYSTOLIC BLOOD PRESSURE: 138 MMHG

## 2018-03-19 DIAGNOSIS — E83.19 IRON OVERLOAD: ICD-10-CM

## 2018-03-19 DIAGNOSIS — D46.9 MYELODYSPLASTIC SYNDROME (HCC): ICD-10-CM

## 2018-03-19 LAB
ALBUMIN SERPL-MCNC: 3.9 G/DL (ref 3.2–4.6)
ALBUMIN/GLOB SERPL: 1.4 {RATIO} (ref 1.2–3.5)
ALP SERPL-CCNC: 81 U/L (ref 50–136)
ALT SERPL-CCNC: 23 U/L (ref 12–65)
ANION GAP SERPL CALC-SCNC: 10 MMOL/L (ref 7–16)
AST SERPL-CCNC: 20 U/L (ref 15–37)
BASOPHILS # BLD: 0 K/UL (ref 0–0.2)
BASOPHILS NFR BLD: 0 % (ref 0–2)
BILIRUB SERPL-MCNC: 0.4 MG/DL (ref 0.2–1.1)
BUN SERPL-MCNC: 28 MG/DL (ref 8–23)
CALCIUM SERPL-MCNC: 9.3 MG/DL (ref 8.3–10.4)
CHLORIDE SERPL-SCNC: 102 MMOL/L (ref 98–107)
CO2 SERPL-SCNC: 26 MMOL/L (ref 21–32)
CREAT SERPL-MCNC: 0.99 MG/DL (ref 0.8–1.5)
DIFFERENTIAL METHOD BLD: ABNORMAL
EOSINOPHIL # BLD: 0.1 K/UL (ref 0–0.8)
EOSINOPHIL NFR BLD: 1 % (ref 0.5–7.8)
ERYTHROCYTE [DISTWIDTH] IN BLOOD BY AUTOMATED COUNT: 14.1 % (ref 11.9–14.6)
FERRITIN SERPL-MCNC: 288 NG/ML (ref 8–388)
GLOBULIN SER CALC-MCNC: 2.8 G/DL (ref 2.3–3.5)
GLUCOSE SERPL-MCNC: 102 MG/DL (ref 65–100)
HCT VFR BLD AUTO: 30.7 % (ref 41.1–50.3)
HGB BLD-MCNC: 10.7 G/DL (ref 13.6–17.2)
LYMPHOCYTES # BLD: 0.7 K/UL (ref 0.5–4.6)
LYMPHOCYTES NFR BLD: 14 % (ref 13–44)
MCH RBC QN AUTO: 33.2 PG (ref 26.1–32.9)
MCHC RBC AUTO-ENTMCNC: 34.9 G/DL (ref 31.4–35)
MCV RBC AUTO: 95.3 FL (ref 79.6–97.8)
MONOCYTES # BLD: 0.4 K/UL (ref 0.1–1.3)
MONOCYTES NFR BLD: 8 % (ref 4–12)
NEUTS SEG # BLD: 3.8 K/UL (ref 1.7–8.2)
NEUTS SEG NFR BLD: 77 % (ref 43–78)
NRBC # BLD: 0 K/UL (ref 0–0.2)
PLATELET # BLD AUTO: 160 K/UL (ref 150–450)
PMV BLD AUTO: 10.1 FL (ref 10.8–14.1)
POTASSIUM SERPL-SCNC: 4.2 MMOL/L (ref 3.5–5.1)
PROT SERPL-MCNC: 6.7 G/DL (ref 6.3–8.2)
RBC # BLD AUTO: 3.22 M/UL (ref 4.23–5.67)
SODIUM SERPL-SCNC: 138 MMOL/L (ref 136–145)
WBC # BLD AUTO: 4.9 K/UL (ref 4.3–11.1)

## 2018-03-19 PROCEDURE — 80053 COMPREHEN METABOLIC PANEL: CPT | Performed by: INTERNAL MEDICINE

## 2018-03-19 PROCEDURE — 85025 COMPLETE CBC W/AUTO DIFF WBC: CPT | Performed by: INTERNAL MEDICINE

## 2018-03-19 PROCEDURE — 36415 COLL VENOUS BLD VENIPUNCTURE: CPT | Performed by: INTERNAL MEDICINE

## 2018-03-19 PROCEDURE — 82728 ASSAY OF FERRITIN: CPT | Performed by: INTERNAL MEDICINE

## 2018-03-19 PROCEDURE — 99195 PHLEBOTOMY: CPT

## 2018-03-19 NOTE — PROGRESS NOTES
Pt. Discharged ambulatory. Tolerated phlebotomy well. No distress noted. To return to Infusions on 5/16/18.

## 2018-04-03 ENCOUNTER — HOSPITAL ENCOUNTER (OUTPATIENT)
Dept: LAB | Age: 78
Discharge: HOME | End: 2018-04-03
Attending: ORTHOPAEDIC SURGERY
Payer: MEDICARE

## 2018-04-03 PROCEDURE — 82495 ASSAY OF CHROMIUM: CPT | Performed by: ORTHOPAEDIC SURGERY

## 2018-04-03 PROCEDURE — 36415 COLL VENOUS BLD VENIPUNCTURE: CPT | Performed by: ORTHOPAEDIC SURGERY

## 2018-04-03 PROCEDURE — 83018 HEAVY METAL QUAN EACH NES: CPT | Performed by: ORTHOPAEDIC SURGERY

## 2018-04-04 LAB — CR SERPL-MCNC: 4.4 UG/L (ref 0.1–2.1)

## 2018-04-05 LAB — COBALT SERPL-MCNC: 14.8 UG/L (ref 0–0.9)

## 2018-05-02 ENCOUNTER — HOSPITAL ENCOUNTER (OUTPATIENT)
Dept: CT IMAGING | Age: 78
Discharge: HOME | End: 2018-05-02
Attending: FAMILY MEDICINE
Payer: MEDICARE

## 2018-05-02 DIAGNOSIS — R10.31 RIGHT LOWER QUADRANT ABDOMINAL PAIN: ICD-10-CM

## 2018-05-02 LAB — CREAT BLD-MCNC: 1 MG/DL (ref 0.8–1.5)

## 2018-05-02 PROCEDURE — 74011000258 HC RX REV CODE- 258: Performed by: FAMILY MEDICINE

## 2018-05-02 PROCEDURE — 74177 CT ABD & PELVIS W/CONTRAST: CPT

## 2018-05-02 PROCEDURE — 82565 ASSAY OF CREATININE: CPT

## 2018-05-02 PROCEDURE — 74011636320 HC RX REV CODE- 636/320: Performed by: FAMILY MEDICINE

## 2018-05-02 RX ORDER — SODIUM CHLORIDE 0.9 % (FLUSH) 0.9 %
10 SYRINGE (ML) INJECTION
Status: COMPLETED | OUTPATIENT
Start: 2018-05-02 | End: 2018-05-02

## 2018-05-02 RX ADMIN — DIATRIZOATE MEGLUMINE AND DIATRIZOATE SODIUM 15 ML: 660; 100 LIQUID ORAL; RECTAL at 14:32

## 2018-05-02 RX ADMIN — SODIUM CHLORIDE 100 ML: 900 INJECTION, SOLUTION INTRAVENOUS at 14:31

## 2018-05-02 RX ADMIN — Medication 10 ML: at 14:31

## 2018-05-02 RX ADMIN — IOPAMIDOL 100 ML: 755 INJECTION, SOLUTION INTRAVENOUS at 14:31

## 2018-05-14 ENCOUNTER — HOSPITAL ENCOUNTER (OUTPATIENT)
Dept: INFUSION THERAPY | Age: 78
Discharge: HOME | End: 2018-05-14
Payer: MEDICARE

## 2018-05-14 VITALS
WEIGHT: 170.8 LBS | RESPIRATION RATE: 16 BRPM | OXYGEN SATURATION: 94 % | HEART RATE: 75 BPM | TEMPERATURE: 98.2 F | BODY MASS INDEX: 34.5 KG/M2 | DIASTOLIC BLOOD PRESSURE: 62 MMHG | SYSTOLIC BLOOD PRESSURE: 126 MMHG

## 2018-05-14 DIAGNOSIS — E83.19 IRON OVERLOAD: ICD-10-CM

## 2018-05-14 LAB
BASOPHILS # BLD: 0 K/UL (ref 0–0.2)
BASOPHILS NFR BLD: 0 % (ref 0–2)
DIFFERENTIAL METHOD BLD: ABNORMAL
EOSINOPHIL # BLD: 0.1 K/UL (ref 0–0.8)
EOSINOPHIL NFR BLD: 2 % (ref 0.5–7.8)
ERYTHROCYTE [DISTWIDTH] IN BLOOD BY AUTOMATED COUNT: 13.9 % (ref 11.9–14.6)
FERRITIN SERPL-MCNC: 227 NG/ML (ref 8–388)
HCT VFR BLD AUTO: 29.8 % (ref 41.1–50.3)
HGB BLD-MCNC: 10.2 G/DL (ref 13.6–17.2)
LYMPHOCYTES # BLD: 0.7 K/UL (ref 0.5–4.6)
LYMPHOCYTES NFR BLD: 14 % (ref 13–44)
MCH RBC QN AUTO: 33.2 PG (ref 26.1–32.9)
MCHC RBC AUTO-ENTMCNC: 34.2 G/DL (ref 31.4–35)
MCV RBC AUTO: 97.1 FL (ref 79.6–97.8)
MONOCYTES # BLD: 0.5 K/UL (ref 0.1–1.3)
MONOCYTES NFR BLD: 10 % (ref 4–12)
NEUTS SEG # BLD: 3.7 K/UL (ref 1.7–8.2)
NEUTS SEG NFR BLD: 73 % (ref 43–78)
NRBC # BLD: 0 K/UL (ref 0–0.2)
PLATELET # BLD AUTO: 160 K/UL (ref 150–450)
PMV BLD AUTO: 9.8 FL (ref 10.8–14.1)
RBC # BLD AUTO: 3.07 M/UL (ref 4.23–5.67)
WBC # BLD AUTO: 5 K/UL (ref 4.3–11.1)

## 2018-05-14 PROCEDURE — 99211 OFF/OP EST MAY X REQ PHY/QHP: CPT

## 2018-05-14 PROCEDURE — 82728 ASSAY OF FERRITIN: CPT | Performed by: INTERNAL MEDICINE

## 2018-05-14 PROCEDURE — 36415 COLL VENOUS BLD VENIPUNCTURE: CPT | Performed by: INTERNAL MEDICINE

## 2018-05-14 PROCEDURE — 85025 COMPLETE CBC W/AUTO DIFF WBC: CPT | Performed by: INTERNAL MEDICINE

## 2018-05-14 NOTE — PROGRESS NOTES
Pt arrived ambulatory to OPI, labs reveiwed hgb 10.2, phlebotomy not indicated, stated he had asked office nurse to add two extra labs, said nurse was going to add labs and phlebotomist in lab guillermina the extra tube, this nurse entered labs,pt to look at results on My Chart, pt discharged home

## 2018-07-02 ENCOUNTER — HOSPITAL ENCOUNTER (OUTPATIENT)
Dept: LAB | Age: 78
Discharge: HOME | End: 2018-07-02
Payer: MEDICARE

## 2018-07-02 DIAGNOSIS — D46.9 MYELODYSPLASTIC SYNDROME (HCC): ICD-10-CM

## 2018-07-02 DIAGNOSIS — E83.19 IRON OVERLOAD: ICD-10-CM

## 2018-07-02 LAB
ALBUMIN SERPL-MCNC: 4.2 G/DL (ref 3.2–4.6)
ALBUMIN/GLOB SERPL: 1.6 {RATIO} (ref 1.2–3.5)
ALP SERPL-CCNC: 74 U/L (ref 50–136)
ALT SERPL-CCNC: 28 U/L (ref 12–65)
ANION GAP SERPL CALC-SCNC: 7 MMOL/L (ref 7–16)
AST SERPL-CCNC: 21 U/L (ref 15–37)
BASOPHILS # BLD: 0 K/UL (ref 0–0.2)
BASOPHILS NFR BLD: 0 % (ref 0–2)
BILIRUB SERPL-MCNC: 0.4 MG/DL (ref 0.2–1.1)
BUN SERPL-MCNC: 32 MG/DL (ref 8–23)
CALCIUM SERPL-MCNC: 9.2 MG/DL (ref 8.3–10.4)
CHLORIDE SERPL-SCNC: 103 MMOL/L (ref 98–107)
CO2 SERPL-SCNC: 27 MMOL/L (ref 21–32)
CREAT SERPL-MCNC: 1.02 MG/DL (ref 0.8–1.5)
DIFFERENTIAL METHOD BLD: ABNORMAL
EOSINOPHIL # BLD: 0.1 K/UL (ref 0–0.8)
EOSINOPHIL NFR BLD: 2 % (ref 0.5–7.8)
ERYTHROCYTE [DISTWIDTH] IN BLOOD BY AUTOMATED COUNT: 13.9 % (ref 11.9–14.6)
FERRITIN SERPL-MCNC: 243 NG/ML (ref 8–388)
GLOBULIN SER CALC-MCNC: 2.6 G/DL (ref 2.3–3.5)
GLUCOSE SERPL-MCNC: 130 MG/DL (ref 65–100)
HCT VFR BLD AUTO: 29.5 % (ref 41.1–50.3)
HGB BLD-MCNC: 10 G/DL (ref 13.6–17.2)
LYMPHOCYTES # BLD: 0.7 K/UL (ref 0.5–4.6)
LYMPHOCYTES NFR BLD: 16 % (ref 13–44)
MCH RBC QN AUTO: 32.7 PG (ref 26.1–32.9)
MCHC RBC AUTO-ENTMCNC: 33.9 G/DL (ref 31.4–35)
MCV RBC AUTO: 96.4 FL (ref 79.6–97.8)
MONOCYTES # BLD: 0.3 K/UL (ref 0.1–1.3)
MONOCYTES NFR BLD: 8 % (ref 4–12)
NEUTS SEG # BLD: 3.1 K/UL (ref 1.7–8.2)
NEUTS SEG NFR BLD: 74 % (ref 43–78)
NRBC # BLD: 0 K/UL (ref 0–0.2)
PLATELET # BLD AUTO: 148 K/UL (ref 150–450)
PMV BLD AUTO: 9.6 FL (ref 10.8–14.1)
POTASSIUM SERPL-SCNC: 4.5 MMOL/L (ref 3.5–5.1)
PROT SERPL-MCNC: 6.8 G/DL (ref 6.3–8.2)
RBC # BLD AUTO: 3.06 M/UL (ref 4.23–5.67)
SODIUM SERPL-SCNC: 137 MMOL/L (ref 136–145)
WBC # BLD AUTO: 4.3 K/UL (ref 4.3–11.1)

## 2018-07-02 PROCEDURE — 80053 COMPREHEN METABOLIC PANEL: CPT | Performed by: INTERNAL MEDICINE

## 2018-07-02 PROCEDURE — 36415 COLL VENOUS BLD VENIPUNCTURE: CPT | Performed by: INTERNAL MEDICINE

## 2018-07-02 PROCEDURE — 82728 ASSAY OF FERRITIN: CPT | Performed by: INTERNAL MEDICINE

## 2018-07-02 PROCEDURE — 85025 COMPLETE CBC W/AUTO DIFF WBC: CPT | Performed by: INTERNAL MEDICINE

## 2018-07-19 ENCOUNTER — APPOINTMENT (OUTPATIENT)
Dept: INFUSION THERAPY | Age: 78
End: 2018-07-19

## 2018-09-04 ENCOUNTER — APPOINTMENT (OUTPATIENT)
Dept: INFUSION THERAPY | Age: 78
End: 2018-09-04
Payer: MEDICARE

## 2018-09-07 ENCOUNTER — HOSPITAL ENCOUNTER (OUTPATIENT)
Dept: INFUSION THERAPY | Age: 78
Discharge: HOME | End: 2018-09-07
Payer: MEDICARE

## 2018-09-07 VITALS
DIASTOLIC BLOOD PRESSURE: 70 MMHG | RESPIRATION RATE: 16 BRPM | BODY MASS INDEX: 34.94 KG/M2 | HEART RATE: 75 BPM | OXYGEN SATURATION: 95 % | WEIGHT: 173 LBS | SYSTOLIC BLOOD PRESSURE: 119 MMHG | TEMPERATURE: 98.2 F

## 2018-09-07 DIAGNOSIS — E83.19 IRON OVERLOAD: ICD-10-CM

## 2018-09-07 DIAGNOSIS — D63.1 ANEMIA OF RENAL DISEASE: ICD-10-CM

## 2018-09-07 DIAGNOSIS — N18.9 ANEMIA OF RENAL DISEASE: ICD-10-CM

## 2018-09-07 LAB
BASOPHILS # BLD: 0 K/UL (ref 0–0.2)
BASOPHILS NFR BLD: 0 % (ref 0–2)
DIFFERENTIAL METHOD BLD: ABNORMAL
EOSINOPHIL # BLD: 0.2 K/UL (ref 0–0.8)
EOSINOPHIL NFR BLD: 3 % (ref 0.5–7.8)
ERYTHROCYTE [DISTWIDTH] IN BLOOD BY AUTOMATED COUNT: 14.2 % (ref 11.9–14.6)
FERRITIN SERPL-MCNC: 201 NG/ML (ref 8–388)
HCT VFR BLD AUTO: 30.4 % (ref 41.1–50.3)
HGB BLD-MCNC: 10.2 G/DL (ref 13.6–17.2)
IMM GRANULOCYTES # BLD: 0 K/UL (ref 0–0.5)
IMM GRANULOCYTES NFR BLD AUTO: 0 % (ref 0–5)
LYMPHOCYTES # BLD: 0.9 K/UL (ref 0.5–4.6)
LYMPHOCYTES NFR BLD: 18 % (ref 13–44)
MCH RBC QN AUTO: 32.6 PG (ref 26.1–32.9)
MCHC RBC AUTO-ENTMCNC: 33.6 G/DL (ref 31.4–35)
MCV RBC AUTO: 97.1 FL (ref 79.6–97.8)
MONOCYTES # BLD: 0.5 K/UL (ref 0.1–1.3)
MONOCYTES NFR BLD: 9 % (ref 4–12)
NEUTS SEG # BLD: 3.4 K/UL (ref 1.7–8.2)
NEUTS SEG NFR BLD: 69 % (ref 43–78)
NRBC # BLD: 0 K/UL (ref 0–0.2)
PLATELET # BLD AUTO: 142 K/UL (ref 150–450)
PMV BLD AUTO: 10.1 FL (ref 9.4–12.3)
RBC # BLD AUTO: 3.13 M/UL (ref 4.23–5.67)
WBC # BLD AUTO: 4.9 K/UL (ref 4.3–11.1)

## 2018-09-07 PROCEDURE — 83018 HEAVY METAL QUAN EACH NES: CPT

## 2018-09-07 PROCEDURE — 36415 COLL VENOUS BLD VENIPUNCTURE: CPT

## 2018-09-07 PROCEDURE — 85025 COMPLETE CBC W/AUTO DIFF WBC: CPT

## 2018-09-07 PROCEDURE — 82728 ASSAY OF FERRITIN: CPT

## 2018-09-07 PROCEDURE — 82495 ASSAY OF CHROMIUM: CPT

## 2018-09-07 PROCEDURE — 99195 PHLEBOTOMY: CPT

## 2018-09-07 NOTE — PROGRESS NOTES
Pt sent from lab for possible therapeutic phlebotomy. Procedure held for hgb = 10.2. Pt discharged ambulatory to home. Next follow up 11/05/18 @ 0800.

## 2018-09-10 LAB — COBALT SERPL-MCNC: 8.1 UG/L (ref 0–0.9)

## 2018-09-11 LAB — CR SERPL-MCNC: 4.9 UG/L (ref 0.1–2.1)

## 2018-10-15 ENCOUNTER — HOSPITAL ENCOUNTER (OUTPATIENT)
Dept: LAB | Age: 78
Discharge: HOME | End: 2018-10-15
Payer: MEDICARE

## 2018-10-15 DIAGNOSIS — E83.19 IRON OVERLOAD: ICD-10-CM

## 2018-10-15 DIAGNOSIS — D46.9 MYELODYSPLASTIC SYNDROME (HCC): ICD-10-CM

## 2018-10-15 LAB
ALBUMIN SERPL-MCNC: 3.6 G/DL (ref 3.2–4.6)
ALBUMIN/GLOB SERPL: 1 {RATIO} (ref 1.2–3.5)
ALP SERPL-CCNC: 71 U/L (ref 50–136)
ALT SERPL-CCNC: 43 U/L (ref 12–65)
ANION GAP SERPL CALC-SCNC: 7 MMOL/L (ref 7–16)
AST SERPL-CCNC: 22 U/L (ref 15–37)
BASOPHILS # BLD: 0 K/UL (ref 0–0.2)
BASOPHILS NFR BLD: 1 % (ref 0–2)
BILIRUB SERPL-MCNC: 0.3 MG/DL (ref 0.2–1.1)
BUN SERPL-MCNC: 31 MG/DL (ref 8–23)
CALCIUM SERPL-MCNC: 9.5 MG/DL (ref 8.3–10.4)
CHLORIDE SERPL-SCNC: 99 MMOL/L (ref 98–107)
CO2 SERPL-SCNC: 28 MMOL/L (ref 21–32)
CREAT SERPL-MCNC: 1.18 MG/DL (ref 0.8–1.5)
DIFFERENTIAL METHOD BLD: ABNORMAL
EOSINOPHIL # BLD: 0.1 K/UL (ref 0–0.8)
EOSINOPHIL NFR BLD: 1 % (ref 0.5–7.8)
ERYTHROCYTE [DISTWIDTH] IN BLOOD BY AUTOMATED COUNT: 13.5 % (ref 11.9–14.6)
FERRITIN SERPL-MCNC: 335 NG/ML (ref 8–388)
GLOBULIN SER CALC-MCNC: 3.5 G/DL (ref 2.3–3.5)
GLUCOSE SERPL-MCNC: 140 MG/DL (ref 65–100)
HCT VFR BLD AUTO: 27.3 % (ref 41.1–50.3)
HGB BLD-MCNC: 9.1 G/DL (ref 13.6–17.2)
IMM GRANULOCYTES # BLD: 0.1 K/UL (ref 0–0.5)
IMM GRANULOCYTES NFR BLD AUTO: 1 % (ref 0–5)
LYMPHOCYTES # BLD: 1 K/UL (ref 0.5–4.6)
LYMPHOCYTES NFR BLD: 16 % (ref 13–44)
MCH RBC QN AUTO: 31.8 PG (ref 26.1–32.9)
MCHC RBC AUTO-ENTMCNC: 33.3 G/DL (ref 31.4–35)
MCV RBC AUTO: 95.5 FL (ref 79.6–97.8)
MONOCYTES # BLD: 0.5 K/UL (ref 0.1–1.3)
MONOCYTES NFR BLD: 7 % (ref 4–12)
NEUTS SEG # BLD: 4.8 K/UL (ref 1.7–8.2)
NEUTS SEG NFR BLD: 74 % (ref 43–78)
NRBC # BLD: 0 K/UL (ref 0–0.2)
PLATELET # BLD AUTO: 259 K/UL (ref 150–450)
PMV BLD AUTO: 9.3 FL (ref 9.4–12.3)
POTASSIUM SERPL-SCNC: 4.3 MMOL/L (ref 3.5–5.1)
PROT SERPL-MCNC: 7.1 G/DL (ref 6.3–8.2)
RBC # BLD AUTO: 2.86 M/UL (ref 4.23–5.67)
SODIUM SERPL-SCNC: 134 MMOL/L (ref 136–145)
WBC # BLD AUTO: 6.4 K/UL (ref 4.3–11.1)

## 2018-10-15 PROCEDURE — 80053 COMPREHEN METABOLIC PANEL: CPT

## 2018-10-15 PROCEDURE — 82728 ASSAY OF FERRITIN: CPT

## 2018-10-15 PROCEDURE — 36415 COLL VENOUS BLD VENIPUNCTURE: CPT

## 2018-10-15 PROCEDURE — 85025 COMPLETE CBC W/AUTO DIFF WBC: CPT

## 2018-10-26 ENCOUNTER — HOSPITAL ENCOUNTER (OUTPATIENT)
Dept: MRI IMAGING | Age: 78
Discharge: HOME | End: 2018-10-26
Attending: INTERNAL MEDICINE
Payer: MEDICARE

## 2018-10-26 DIAGNOSIS — D46.9 MYELODYSPLASTIC SYNDROME (HCC): ICD-10-CM

## 2018-10-26 DIAGNOSIS — E83.19 IRON OVERLOAD: ICD-10-CM

## 2018-10-26 PROCEDURE — 74181 MRI ABDOMEN W/O CONTRAST: CPT

## 2018-12-02 ENCOUNTER — APPOINTMENT (OUTPATIENT)
Dept: GENERAL RADIOLOGY | Age: 78
DRG: 355 | End: 2018-12-02
Attending: EMERGENCY MEDICINE
Payer: MEDICARE

## 2018-12-02 ENCOUNTER — HOSPITAL ENCOUNTER (INPATIENT)
Age: 78
LOS: 5 days | Discharge: HOME | DRG: 355 | End: 2018-12-07
Attending: EMERGENCY MEDICINE | Admitting: INTERNAL MEDICINE
Payer: MEDICARE

## 2018-12-02 DIAGNOSIS — K56.609 SMALL BOWEL OBSTRUCTION (HCC): Primary | ICD-10-CM

## 2018-12-02 LAB
ALBUMIN SERPL-MCNC: 4.5 G/DL (ref 3.2–4.6)
ALBUMIN/GLOB SERPL: 1.5 {RATIO} (ref 1.2–3.5)
ALP SERPL-CCNC: 75 U/L (ref 50–136)
ALT SERPL-CCNC: 33 U/L (ref 12–65)
ANION GAP SERPL CALC-SCNC: 11 MMOL/L (ref 7–16)
AST SERPL-CCNC: 22 U/L (ref 15–37)
BASOPHILS # BLD: 0 K/UL (ref 0–0.2)
BASOPHILS NFR BLD: 0 % (ref 0–2)
BILIRUB SERPL-MCNC: 0.7 MG/DL (ref 0.2–1.1)
BUN SERPL-MCNC: 34 MG/DL (ref 8–23)
CALCIUM SERPL-MCNC: 9.6 MG/DL (ref 8.3–10.4)
CHLORIDE SERPL-SCNC: 95 MMOL/L (ref 98–107)
CO2 SERPL-SCNC: 26 MMOL/L (ref 21–32)
CREAT SERPL-MCNC: 1.3 MG/DL (ref 0.8–1.5)
DIFFERENTIAL METHOD BLD: ABNORMAL
EOSINOPHIL # BLD: 0 K/UL (ref 0–0.8)
EOSINOPHIL NFR BLD: 0 % (ref 0.5–7.8)
ERYTHROCYTE [DISTWIDTH] IN BLOOD BY AUTOMATED COUNT: 14.9 % (ref 11.9–14.6)
GLOBULIN SER CALC-MCNC: 3 G/DL (ref 2.3–3.5)
GLUCOSE SERPL-MCNC: 137 MG/DL (ref 65–100)
HCT VFR BLD AUTO: 31.1 % (ref 41.1–50.3)
HGB BLD-MCNC: 10.6 G/DL (ref 13.6–17.2)
IMM GRANULOCYTES # BLD: 0.1 K/UL (ref 0–0.5)
IMM GRANULOCYTES NFR BLD AUTO: 1 % (ref 0–5)
LYMPHOCYTES # BLD: 0.8 K/UL (ref 0.5–4.6)
LYMPHOCYTES NFR BLD: 6 % (ref 13–44)
MCH RBC QN AUTO: 32.9 PG (ref 26.1–32.9)
MCHC RBC AUTO-ENTMCNC: 34.1 G/DL (ref 31.4–35)
MCV RBC AUTO: 96.6 FL (ref 79.6–97.8)
MONOCYTES # BLD: 0.7 K/UL (ref 0.1–1.3)
MONOCYTES NFR BLD: 5 % (ref 4–12)
NEUTS SEG # BLD: 12.6 K/UL (ref 1.7–8.2)
NEUTS SEG NFR BLD: 89 % (ref 43–78)
NRBC # BLD: 0 K/UL (ref 0–0.2)
PLATELET # BLD AUTO: 264 K/UL (ref 150–450)
PMV BLD AUTO: 9.7 FL (ref 9.4–12.3)
POTASSIUM SERPL-SCNC: 4.1 MMOL/L (ref 3.5–5.1)
PROT SERPL-MCNC: 7.5 G/DL (ref 6.3–8.2)
RBC # BLD AUTO: 3.22 M/UL (ref 4.23–5.6)
SODIUM SERPL-SCNC: 132 MMOL/L (ref 136–145)
WBC # BLD AUTO: 14.2 K/UL (ref 4.3–11.1)

## 2018-12-02 PROCEDURE — 74011000250 HC RX REV CODE- 250: Performed by: INTERNAL MEDICINE

## 2018-12-02 PROCEDURE — 85025 COMPLETE CBC W/AUTO DIFF WBC: CPT

## 2018-12-02 PROCEDURE — 77030008771 HC TU NG SALEM SUMP -A

## 2018-12-02 PROCEDURE — 0D9770Z DRAINAGE OF STOMACH, PYLORUS WITH DRAINAGE DEVICE, VIA NATURAL OR ARTIFICIAL OPENING: ICD-10-PCS | Performed by: INTERNAL MEDICINE

## 2018-12-02 PROCEDURE — 74011250636 HC RX REV CODE- 250/636: Performed by: EMERGENCY MEDICINE

## 2018-12-02 PROCEDURE — 81003 URINALYSIS AUTO W/O SCOPE: CPT | Performed by: EMERGENCY MEDICINE

## 2018-12-02 PROCEDURE — 94640 AIRWAY INHALATION TREATMENT: CPT

## 2018-12-02 PROCEDURE — 99284 EMERGENCY DEPT VISIT MOD MDM: CPT | Performed by: EMERGENCY MEDICINE

## 2018-12-02 PROCEDURE — 80053 COMPREHEN METABOLIC PANEL: CPT

## 2018-12-02 PROCEDURE — 74022 RADEX COMPL AQT ABD SERIES: CPT

## 2018-12-02 PROCEDURE — 65270000029 HC RM PRIVATE

## 2018-12-02 PROCEDURE — 77030020255 HC SOL INJ LR 1000ML BG

## 2018-12-02 PROCEDURE — 96360 HYDRATION IV INFUSION INIT: CPT | Performed by: EMERGENCY MEDICINE

## 2018-12-02 PROCEDURE — 74011000250 HC RX REV CODE- 250: Performed by: EMERGENCY MEDICINE

## 2018-12-02 PROCEDURE — 74011250637 HC RX REV CODE- 250/637: Performed by: INTERNAL MEDICINE

## 2018-12-02 RX ORDER — BISACODYL 5 MG
5 TABLET, DELAYED RELEASE (ENTERIC COATED) ORAL DAILY PRN
Status: DISCONTINUED | OUTPATIENT
Start: 2018-12-02 | End: 2018-12-07 | Stop reason: HOSPADM

## 2018-12-02 RX ORDER — SODIUM CHLORIDE 0.9 % (FLUSH) 0.9 %
5-10 SYRINGE (ML) INJECTION EVERY 8 HOURS
Status: DISCONTINUED | OUTPATIENT
Start: 2018-12-02 | End: 2018-12-07 | Stop reason: HOSPADM

## 2018-12-02 RX ORDER — FOLIC ACID 1 MG/1
1 TABLET ORAL DAILY
Status: DISCONTINUED | OUTPATIENT
Start: 2018-12-03 | End: 2018-12-07 | Stop reason: HOSPADM

## 2018-12-02 RX ORDER — ENOXAPARIN SODIUM 100 MG/ML
40 INJECTION SUBCUTANEOUS EVERY 24 HOURS
Status: DISCONTINUED | OUTPATIENT
Start: 2018-12-02 | End: 2018-12-07 | Stop reason: HOSPADM

## 2018-12-02 RX ORDER — SODIUM CHLORIDE, SODIUM LACTATE, POTASSIUM CHLORIDE, CALCIUM CHLORIDE 600; 310; 30; 20 MG/100ML; MG/100ML; MG/100ML; MG/100ML
150 INJECTION, SOLUTION INTRAVENOUS CONTINUOUS
Status: DISCONTINUED | OUTPATIENT
Start: 2018-12-02 | End: 2018-12-06

## 2018-12-02 RX ORDER — ONDANSETRON 2 MG/ML
4 INJECTION INTRAMUSCULAR; INTRAVENOUS
Status: DISCONTINUED | OUTPATIENT
Start: 2018-12-02 | End: 2018-12-05 | Stop reason: SDUPTHER

## 2018-12-02 RX ORDER — SIMVASTATIN 20 MG/1
20 TABLET, FILM COATED ORAL DAILY
Status: DISCONTINUED | OUTPATIENT
Start: 2018-12-02 | End: 2018-12-07 | Stop reason: HOSPADM

## 2018-12-02 RX ORDER — ACETAMINOPHEN 325 MG/1
650 TABLET ORAL
Status: DISCONTINUED | OUTPATIENT
Start: 2018-12-02 | End: 2018-12-07 | Stop reason: HOSPADM

## 2018-12-02 RX ORDER — LISINOPRIL AND HYDROCHLOROTHIAZIDE 20; 25 MG/1; MG/1
1 TABLET ORAL DAILY
Status: DISCONTINUED | OUTPATIENT
Start: 2018-12-03 | End: 2018-12-07 | Stop reason: HOSPADM

## 2018-12-02 RX ORDER — MELATONIN
1000 DAILY
Status: DISCONTINUED | OUTPATIENT
Start: 2018-12-03 | End: 2018-12-07 | Stop reason: HOSPADM

## 2018-12-02 RX ORDER — LATANOPROST 50 UG/ML
1 SOLUTION/ DROPS OPHTHALMIC
Status: DISCONTINUED | OUTPATIENT
Start: 2018-12-02 | End: 2018-12-07 | Stop reason: HOSPADM

## 2018-12-02 RX ORDER — PRAMIPEXOLE DIHYDROCHLORIDE 0.25 MG/1
0.5 TABLET ORAL
Status: DISCONTINUED | OUTPATIENT
Start: 2018-12-02 | End: 2018-12-07 | Stop reason: HOSPADM

## 2018-12-02 RX ORDER — GABAPENTIN 400 MG/1
400 CAPSULE ORAL 3 TIMES DAILY
Status: DISCONTINUED | OUTPATIENT
Start: 2018-12-02 | End: 2018-12-07 | Stop reason: HOSPADM

## 2018-12-02 RX ORDER — ASPIRIN 81 MG/1
81 TABLET ORAL DAILY
Status: DISCONTINUED | OUTPATIENT
Start: 2018-12-03 | End: 2018-12-07 | Stop reason: HOSPADM

## 2018-12-02 RX ORDER — SODIUM CHLORIDE 0.9 % (FLUSH) 0.9 %
5-10 SYRINGE (ML) INJECTION AS NEEDED
Status: DISCONTINUED | OUTPATIENT
Start: 2018-12-02 | End: 2018-12-07 | Stop reason: HOSPADM

## 2018-12-02 RX ORDER — LIDOCAINE HYDROCHLORIDE 40 MG/ML
3 SOLUTION TOPICAL ONCE
Status: COMPLETED | OUTPATIENT
Start: 2018-12-02 | End: 2018-12-02

## 2018-12-02 RX ADMIN — LIDOCAINE HYDROCHLORIDE 3 ML: 40 SOLUTION TOPICAL at 14:35

## 2018-12-02 RX ADMIN — SODIUM CHLORIDE, SODIUM LACTATE, POTASSIUM CHLORIDE, AND CALCIUM CHLORIDE 150 ML/HR: 600; 310; 30; 20 INJECTION, SOLUTION INTRAVENOUS at 14:31

## 2018-12-02 RX ADMIN — SIMVASTATIN 20 MG: 20 TABLET, FILM COATED ORAL at 22:21

## 2018-12-02 RX ADMIN — GABAPENTIN 400 MG: 400 CAPSULE ORAL at 22:20

## 2018-12-02 RX ADMIN — SODIUM CHLORIDE, SODIUM LACTATE, POTASSIUM CHLORIDE, AND CALCIUM CHLORIDE 150 ML/HR: 600; 310; 30; 20 INJECTION, SOLUTION INTRAVENOUS at 23:23

## 2018-12-02 RX ADMIN — PRAMIPEXOLE DIHYDROCHLORIDE 0.5 MG: 0.25 TABLET ORAL at 22:19

## 2018-12-02 RX ADMIN — LATANOPROST 1 DROP: 50 SOLUTION OPHTHALMIC at 22:22

## 2018-12-02 NOTE — H&P
HOSPITALIST H&P/CONSULTNAME:  Stuart Acosta Age:  66 y.o. 
:   1940 MRN:   [id number] PCP: Aaliyah Valdovinos MD 
Consulting MD: Treatment Team: Attending Provider: Valerie Campos MD; Primary Nurse: Cristian Marks RN 
HPI:  
 
67 yo CM with past history of HTN, multiple chronic joint problems s/p multiple surgeries, s/p cholecystectomy, MDD (requires phlebotomy for iron overload), and glaucoma presents with a 2-day history of intractable nausea/vomiting and inability to keep food down since eating soup on Friday. Patient has had a SBO in 2018 that was treated conservatively with NG tube. Patient did try to drink a Coke this morning and had vomiting afterwards. He had a small bowel movement yesterday. No fevers/chills, chest pain, headache, BRBPR, melena. ED Course: KUB showing dilated bowel with air-fluid levels suggestive of SBO. CBC showing mildly elevated WBC count of 14 and (known) chronic anemia. Patient receiving aerosolized lidocaine prior to insertion of NG tube Complete ROS done and is as stated in HPI or otherwise negative Past Medical History:  
Diagnosis Date  Anemia  Glaucoma  History of small bowel obstruction 2018  
 releived with bowel rest and NGT  
 HTN (hypertension)  Hypercholesterolemia  Iron overload   
 regular phlebotomy  Macular degeneration  Myelodysplastic syndrome ([address]  Osteoarthritis of multiple joints  Peripheral neuropathy 2010  RLS (restless legs syndrome)  Sleep apnea  Spondylolisthesis, acquired 2010 Past Surgical History:  
Procedure Laterality Date  HX CARPAL TUNNEL RELEASE Right 2014  HX CATARACT REMOVAL Bilateral 2017  HX CERVICAL DISKECTOMY  2002  HX CERVICAL FUSION   &   
 cervical plate placement  HX CHOLECYSTECTOMY  14  HX COLONOSCOPY    HX LUMBAR LAMINECTOMY  9/2/10  
 lumbar laminectomy L4 x 2/ rods 1 [facility] ankle fusion with hardware  HX ORTHOPAEDIC Left 2011  
 fusion of bones in foot  HX ORTHOPAEDIC  2013  
 rods in back  HX THORACIC LAMINECTOMY  09 T12, L2,L3  
 NEUROLOGICAL PROCEDURE UNLISTED   T10-T12 fusion  TOTAL HIP ARTHROPLASTY Bilateral   
 R in , l in   TOTAL KNEE ARTHROPLASTY Left 2003  TOTAL KNEE ARTHROPLASTY Right 2009 Prior to Admission Medications Prescriptions Last Dose Informant Patient Reported? Taking?  
aspirin delayed-release 81 mg tablet   Yes No  
Sig: Take  by mouth daily. cholecalciferol (VITAMIN D3) 1,000 unit tablet   Yes No  
Sig: Take 1,000 Units by mouth daily. cpap machine kit   Yes No  
Sig: by Does Not Apply route. [id number]  
folic acid (FOLVITE) 1 mg tablet   Yes No  
Sig: Take  by mouth daily. gabapentin (NEURONTIN) 400 mg capsule   No No  
Si po tid  
latanoprost (XALATAN) 0.005 % ophthalmic solution   Yes No  
Sig: Administer 1 Drop to both eyes nightly. Indications: OPEN ANGLE GLAUCOMA  
lisinopril-hydroCHLOROthiazide (ZESTORETIC) 20-25 mg per tablet   No No  
Si po qd  
meloxicam (MOBIC) 15 mg tablet   No No  
Si po qd  
multivit-min/FA/lycopen/lutein (CENTRUM SILVER MEN PO)   Yes No  
Sig: Take  by mouth daily. omega 3-dha-epa-fish oil (FISH OIL) 100-160-1,000 mg cap   Yes No  
Sig: Take  by mouth daily. pramipexole (MIRAPEX) 0.5 mg tablet   No No  
Si po 5 times daily  
simvastatin (ZOCOR) 20 mg tablet   No No  
Si po qd  
triamcinolone acetonide (KENALOG) 0.1 % topical cream   Yes No  
Sig: Apply  to affected area two (2) times daily as needed for Skin Irritation. use thin layer  
vit C/E/Zn/coppr/lutein/zeaxan (PRESERVISION AREDS-2 PO)   Yes No  
Sig: Take  by mouth. Facility-Administered Medications: None Allergies Allergen Reactions  Flagyl [Metronidazole] Other (comments) Pt states burning sensation all over body.  Azithromycin Unknown (comments) Severe headache  Gadolinium-Containing Contrast Media Other (comments) Severe pain  Macrolide Antibiotics Rash  Penicillins Rash  Quinolones Rash  Roxicodone [Oxycodone] Rash  Ultram [Tramadol] Rash Social History Tobacco Use  Smoking status: Former Smoker Packs/day: 1.00 Years: 8.00 Pack years: 8.00 Last attempt to quit: 1967 Years since quittin.9  Smokeless tobacco: Never Used Substance Use Topics  Alcohol use: Yes Alcohol/week: 7.0 oz Types: 14 Cans of beer per week Comment: week Family History Problem Relation Age of Onset  Heart Disease Mother   
     carotid enarterectomy  Cancer Mother   
     hx of lymph node  Arthritis-osteo Brother  Lung Disease Brother COPD  Cancer Father   
     prostate Objective:  
 
Visit Vitals /69 Pulse 94 Temp 98.4 °F (36.9 °C) Resp 18 Ht 4' 11\" (1.499 m) Wt 74.8 kg (165 lb) SpO2 99% BMI 33.33 kg/m² Temp (24hrs), Av.4 °F (36.9 °C), Min:98.4 °F (36.9 °C), Max:98.4 °F (36.9 °C) Oxygen Therapy O2 Sat (%): 99 % (18 1436) Pulse via Oximetry: 84 beats per minute (18 1436) O2 Device: Room air (18 1436) Physical Exam: 
General:    Alert, cooperative, no distress, appears stated age. Head:   Normocephalic, without obvious abnormality, atraumatic. Nose:  Nares normal. No drainage or sinus tenderness. Lungs:   Clear to auscultation bilaterally. No Wheezing or Rhonchi. No rales. Heart:   Regular rate and rhythm,  no murmur, rub or gallop. Abdomen:   Distended abdomen nontender to palpation with no audible bowel sounds, no rebound tenderness Extremities: No cyanosis. No edema. No clubbing Skin:     Texture, turgor normal. No rashes or lesions. Not Jaundiced Neurologic: Alert and oriented x 3, no focal deficits Data Review:  
Recent Results (from the past 24 hour(s)) CBC WITH AUTOMATED DIFF  
 Collection Time: 12/02/18 12:29 PM  
Result Value Ref Range WBC 14.2 (H) 4.3 - 11.1 K/uL  
 RBC 3.22 (L) 4.23 - 5.6 M/uL  
 HGB 10.6 (L) 13.6 - 17.2 g/dL HCT 31.1 (L) 41.1 - 50.3 % MCV 96.6 79.6 - 97.8 FL  
 MCH 32.9 26.1 - 32.9 PG  
 MCHC 34.1 31.4 - 35.0 g/dL  
 RDW 14.9 (H) 11.9 - 14.6 % PLATELET 081 081 - 384 K/uL MPV 9.7 9.4 - 12.3 FL ABSOLUTE NRBC 0.00 0.0 - 0.2 K/uL  
 DF AUTOMATED NEUTROPHILS 89 (H) 43 - 78 % LYMPHOCYTES 6 (L) 13 - 44 % MONOCYTES 5 4.0 - 12.0 % EOSINOPHILS 0 (L) 0.5 - 7.8 % BASOPHILS 0 0.0 - 2.0 % IMMATURE GRANULOCYTES 1 0.0 - 5.0 %  
 ABS. NEUTROPHILS 12.6 (H) 1.7 - 8.2 K/UL  
 ABS. LYMPHOCYTES 0.8 0.5 - 4.6 K/UL  
 ABS. MONOCYTES 0.7 0.1 - 1.3 K/UL  
 ABS. EOSINOPHILS 0.0 0.0 - 0.8 K/UL  
 ABS. BASOPHILS 0.0 0.0 - 0.2 K/UL  
 ABS. IMM. GRANS. 0.1 0.0 - 0.5 K/UL METABOLIC PANEL, COMPREHENSIVE Collection Time: 12/02/18 12:29 PM  
Result Value Ref Range Sodium 132 (L) 136 - 145 mmol/L Potassium 4.1 3.5 - 5.1 mmol/L Chloride 95 (L) 98 - 107 mmol/L  
 CO2 26 21 - 32 mmol/L Anion gap 11 7 - 16 mmol/L Glucose 137 (H) 65 - 100 mg/dL BUN 34 (H) 8 - 23 MG/DL Creatinine 1.30 0.8 - 1.5 MG/DL  
 GFR est AA >60 >60 ml/min/1.73m2 GFR est non-AA 57 (L) >60 ml/min/1.73m2 Calcium 9.6 8.3 - 10.4 MG/DL Bilirubin, total 0.7 0.2 - 1.1 MG/DL  
 ALT (SGPT) 33 12 - 65 U/L  
 AST (SGOT) 22 15 - 37 U/L Alk. phosphatase 75 50 - 136 U/L Protein, total 7.5 6.3 - 8.2 g/dL Albumin 4.5 3.2 - 4.6 g/dL Globulin 3.0 2.3 - 3.5 g/dL A-G Ratio 1.5 1.2 - 3.5 Imaging Deepa Mccarty Factor Assessment and Plan: Active Hospital Problems Diagnosis Date Noted  Small bowel obstruction ([facility] [address] 12/02/2018  Myelodysplastic syndrome ([facility] [address]  Dyslipidemia 07/02/2015  
 HTN (hypertension) 04/09/2014 PLAN 
· Admit to Inpatient · ED to insert NG tube prior to admission · Will hold off on inpatient general surgery consult as past episode of SBO responded to conservative management · IVFs to make up for insensible fluid losses from N/V 
· Continue with home medications as prescribed F/E/N: fluids as above, replete electrolytes as needed, NPO for now Ppx: Lovenox for VTE Code Status: FULL CODE Disposition: pending workup as above Signed By: Osmar Marcos DO   
 December 2, 2018

## 2018-12-02 NOTE — ED PROVIDER NOTES
Patient had a bowel obstruction in September. On Friday after eating he began to feel bloated and uncomfortable. He has not eaten or drank much since and hopes of preventing another bowel obstruction. He presents with continued bloating sensation. It was relieved partially with vomiting after drinking Coke this morning. The history is provided by the patient. Abdominal Pain This is a new problem. Episode onset: Friday. The problem occurs constantly. The problem has not changed since onset. The pain is associated with vomiting. The pain is located in the generalized abdominal region. Quality: \"bloating\" Associated symptoms include nausea, vomiting (vomited some Coke he dranktthis morning) and constipation. Pertinent negatives include no fever, no diarrhea, no hematochezia, no melena, no dysuria, no frequency and no hematuria. Nothing worsens the pain. The pain is relieved by vomiting. The patient's surgical history includes cholecystectomy. hiatal hernia, bowel obstruction versus Past Medical History:  
Diagnosis Date  Anemia  Glaucoma  History of small bowel obstruction 09/2018  
 releived with bowel rest and NGT  
 HTN (hypertension)  Hypercholesterolemia  Iron overload   
 regular phlebotomy  Macular degeneration  Myelodysplastic syndrome ([address]  Osteoarthritis of multiple joints  Peripheral neuropathy 11/1/2010  RLS (restless legs syndrome)  Sleep apnea  Spondylolisthesis, acquired 12/5/2010 Past Surgical History:  
Procedure Laterality Date  HX CARPAL TUNNEL RELEASE Right 2014  HX CATARACT REMOVAL Bilateral 2017  HX CERVICAL DISKECTOMY  5/2002  HX CERVICAL FUSION  2002 & 2011  
 cervical plate placement  HX CHOLECYSTECTOMY  4/17/14  HX COLONOSCOPY  2001/2008  HX LUMBAR LAMINECTOMY  9/2/10  
 lumbar laminectomy L4 x 2/ rods  HX ORTHOPAEDIC Right 1985  
 ankle fusion with hardware  HX ORTHOPAEDIC Left 2/14/2011 fusion of bones in foot  HX ORTHOPAEDIC  2013  
 rods in back  HX THORACIC LAMINECTOMY  09 T12, L2,L3  
 NEUROLOGICAL PROCEDURE UNLISTED   T10-T12 fusion  TOTAL HIP ARTHROPLASTY Bilateral   
 R in , l in   TOTAL KNEE ARTHROPLASTY Left 2003  TOTAL KNEE ARTHROPLASTY Right 2009 Family History:  
Problem Relation Age of Onset  Heart Disease Mother   
     carotid enarterectomy  Cancer Mother   
     hx of lymph node  Arthritis-osteo Brother  Lung Disease Brother COPD  Cancer Father   
     prostate Social History Socioeconomic History  Marital status:  Spouse name: Not on file  Number of children: 3  
 Years of education: Not on file  Highest education level: Not on file Social Needs  Financial resource strain: Not on file  Food insecurity - worry: Not on file  Food insecurity - inability: Not on file  Transportation needs - medical: Not on file  Transportation needs - non-medical: Not on file Occupational History  Not on file Tobacco Use  Smoking status: Former Smoker Packs/day: 1.00 Years: 8.00 Pack years: 8.00 Last attempt to quit: 1967 Years since quittin.9  Smokeless tobacco: Never Used Substance and Sexual Activity  Alcohol use: Yes Alcohol/week: 7.0 oz Types: 14 Cans of beer per week Comment: week  Drug use: No  
 Sexual activity: Not Currently Other Topics Concern [address] Service Not Asked  Blood Transfusions Not Asked  Caffeine Concern Not Asked  Occupational Exposure Not Asked Willacy Haff Hazards Not Asked  Sleep Concern Not Asked  Stress Concern Not Asked  Weight Concern Not Asked  Special Diet No  
 Back Care Not Asked  Exercise No  
 Bike Helmet Not Asked  Seat Belt Not Asked  Self-Exams Not Asked Social History Narrative  Not on file ALLERGIES: Flagyl [metronidazole]; Azithromycin; Gadolinium-containing contrast media; Macrolide antibiotics; Penicillins; Quinolones; Roxicodone [oxycodone]; and Ultram [tramadol] Review of Systems Constitutional: Negative for fever. Gastrointestinal: Positive for abdominal pain, constipation, nausea and vomiting (vomited some Coke he dranktthis morning). Negative for diarrhea, hematochezia and melena. Endocrine: Negative for polydipsia and polyuria. Genitourinary: Positive for decreased urine volume. Negative for dysuria, frequency and hematuria. Vitals:  
 12/02/18 1227 BP: 129/69 Pulse: 94 Resp: 18 Temp: 98.4 °F (36.9 °C) SpO2: 97% Weight: 74.8 kg (165 lb) Height: 4' 11\" (1.499 m) Physical Exam  
Constitutional: He is oriented to person, place, and time. He appears well-developed and well-nourished. HENT:  
Mouth/Throat: Oropharynx is clear and moist. No oropharyngeal exudate. Eyes: Conjunctivae are normal. Pupils are equal, round, and reactive to light. Neck: Neck supple. Cardiovascular: Normal rate, regular rhythm and normal heart sounds. Pulmonary/Chest: Effort normal and breath sounds normal.  
Abdominal: Soft. He exhibits no distension. There is no tenderness. There is no rebound and no guarding. Bowel sounds tympanitic, mild Musculoskeletal: Normal range of motion. He exhibits no edema or tenderness. Lymphadenopathy:  
  He has no cervical adenopathy. Neurological: He is alert and oriented to person, place, and time. Skin: Skin is warm and dry. Nursing note and vitals reviewed. MDM Number of Diagnoses or Management Options Diagnosis management comments: Benign abdomen. Concerned about acute kidney injury given self imposed to dehydration. Hydrate and check for acute renal failure. Acute abdominal series to evaluate for ileus versus obstruction. Exam quite benign. Amount and/or Complexity of Data Reviewed Clinical lab tests: ordered and reviewed Tests in the radiology section of CPT®: ordered and reviewed Procedures

## 2018-12-02 NOTE — ROUTINE PROCESS
TRANSFER - OUT REPORT: 
 
Verbal report given to Urban Garcia RN on Chery Chavez  being transferred to 2nd floor for routine progression of care Report consisted of patients Situation, Background, Assessment and  
Recommendations(SBAR). Information from the following report(s) ED Summary was reviewed with the receiving nurse. Lines:  
Peripheral IV 12/02/18 Right Antecubital (Active) Site Assessment Clean, dry, & intact 12/2/2018 12:28 PM  
Phlebitis Assessment 0 12/2/2018 12:28 PM  
Infiltration Assessment 0 12/2/2018 12:28 PM  
Dressing Status Clean, dry, & intact 12/2/2018 12:28 PM  
  
 
Opportunity for questions and clarification was provided.    
 
Patient transported with:

## 2018-12-02 NOTE — ED TRIAGE NOTES
Pt arrives stating possible bowel obstruction, states hx. Pt states abd pain and bloating in stomach, pain is more lower abd. Pt states last BM was small on Saturday. Pt hasnt eaten in 48 hours. Pt reports some vomiting as well.

## 2018-12-02 NOTE — ED NOTES
NG tubed placed per MD order. Patient tolerated well. NG to low intermittent suction. Noted green gastric content.

## 2018-12-03 LAB
ANION GAP SERPL CALC-SCNC: 7 MMOL/L (ref 7–16)
BASOPHILS # BLD: 0 K/UL (ref 0–0.2)
BASOPHILS NFR BLD: 0 % (ref 0–2)
BUN SERPL-MCNC: 32 MG/DL (ref 8–23)
CALCIUM SERPL-MCNC: 9 MG/DL (ref 8.3–10.4)
CHLORIDE SERPL-SCNC: 97 MMOL/L (ref 98–107)
CO2 SERPL-SCNC: 29 MMOL/L (ref 21–32)
CREAT SERPL-MCNC: 0.96 MG/DL (ref 0.8–1.5)
DIFFERENTIAL METHOD BLD: ABNORMAL
EOSINOPHIL # BLD: 0 K/UL (ref 0–0.8)
EOSINOPHIL NFR BLD: 0 % (ref 0.5–7.8)
ERYTHROCYTE [DISTWIDTH] IN BLOOD BY AUTOMATED COUNT: 14.9 % (ref 11.9–14.6)
GLUCOSE SERPL-MCNC: 117 MG/DL (ref 65–100)
HCT VFR BLD AUTO: 27.9 % (ref 41.1–50.3)
HGB BLD-MCNC: 9.4 G/DL (ref 13.6–17.2)
IMM GRANULOCYTES # BLD: 0.1 K/UL (ref 0–0.5)
IMM GRANULOCYTES NFR BLD AUTO: 1 % (ref 0–5)
LYMPHOCYTES # BLD: 0.5 K/UL (ref 0.5–4.6)
LYMPHOCYTES NFR BLD: 4 % (ref 13–44)
MAGNESIUM SERPL-MCNC: 2.1 MG/DL (ref 1.8–2.4)
MCH RBC QN AUTO: 32.5 PG (ref 26.1–32.9)
MCHC RBC AUTO-ENTMCNC: 33.7 G/DL (ref 31.4–35)
MCV RBC AUTO: 96.5 FL (ref 79.6–97.8)
MONOCYTES # BLD: 0.7 K/UL (ref 0.1–1.3)
MONOCYTES NFR BLD: 6 % (ref 4–12)
NEUTS SEG # BLD: 11.1 K/UL (ref 1.7–8.2)
NEUTS SEG NFR BLD: 89 % (ref 43–78)
NRBC # BLD: 0 K/UL (ref 0–0.2)
PLATELET # BLD AUTO: 204 K/UL (ref 150–450)
PMV BLD AUTO: 9.9 FL (ref 9.4–12.3)
POTASSIUM SERPL-SCNC: 3.3 MMOL/L (ref 3.5–5.1)
RBC # BLD AUTO: 2.89 M/UL (ref 4.23–5.6)
SODIUM SERPL-SCNC: 133 MMOL/L (ref 136–145)
WBC # BLD AUTO: 12.5 K/UL (ref 4.3–11.1)

## 2018-12-03 PROCEDURE — 83735 ASSAY OF MAGNESIUM: CPT

## 2018-12-03 PROCEDURE — 36415 COLL VENOUS BLD VENIPUNCTURE: CPT

## 2018-12-03 PROCEDURE — 77030020255 HC SOL INJ LR 1000ML BG

## 2018-12-03 PROCEDURE — 74011250637 HC RX REV CODE- 250/637: Performed by: INTERNAL MEDICINE

## 2018-12-03 PROCEDURE — 85025 COMPLETE CBC W/AUTO DIFF WBC: CPT

## 2018-12-03 PROCEDURE — 80048 BASIC METABOLIC PNL TOTAL CA: CPT

## 2018-12-03 PROCEDURE — 74011250636 HC RX REV CODE- 250/636: Performed by: EMERGENCY MEDICINE

## 2018-12-03 PROCEDURE — 74011250636 HC RX REV CODE- 250/636: Performed by: INTERNAL MEDICINE

## 2018-12-03 PROCEDURE — 65270000029 HC RM PRIVATE

## 2018-12-03 RX ORDER — POTASSIUM CHLORIDE 20 MEQ/1
40 TABLET, EXTENDED RELEASE ORAL
Status: COMPLETED | OUTPATIENT
Start: 2018-12-03 | End: 2018-12-03

## 2018-12-03 RX ORDER — POTASSIUM CHLORIDE 14.9 MG/ML
20 INJECTION INTRAVENOUS
Status: COMPLETED | OUTPATIENT
Start: 2018-12-03 | End: 2018-12-03

## 2018-12-03 RX ADMIN — LISINOPRIL AND HYDROCHLOROTHIAZIDE 1 TABLET: 25; 20 TABLET ORAL at 08:01

## 2018-12-03 RX ADMIN — PRAMIPEXOLE DIHYDROCHLORIDE 0.5 MG: 0.25 TABLET ORAL at 21:09

## 2018-12-03 RX ADMIN — PRAMIPEXOLE DIHYDROCHLORIDE 0.5 MG: 0.25 TABLET ORAL at 08:01

## 2018-12-03 RX ADMIN — PRAMIPEXOLE DIHYDROCHLORIDE 0.5 MG: 0.25 TABLET ORAL at 13:05

## 2018-12-03 RX ADMIN — GABAPENTIN 400 MG: 400 CAPSULE ORAL at 08:01

## 2018-12-03 RX ADMIN — PRAMIPEXOLE DIHYDROCHLORIDE 0.5 MG: 0.25 TABLET ORAL at 17:25

## 2018-12-03 RX ADMIN — SIMVASTATIN 20 MG: 20 TABLET, FILM COATED ORAL at 21:09

## 2018-12-03 RX ADMIN — PRAMIPEXOLE DIHYDROCHLORIDE 0.5 MG: 0.25 TABLET ORAL at 10:58

## 2018-12-03 RX ADMIN — VITAMIN D, TAB 1000IU (100/BT) 1000 UNITS: 25 TAB at 08:01

## 2018-12-03 RX ADMIN — POTASSIUM CHLORIDE 20 MEQ: 200 INJECTION, SOLUTION INTRAVENOUS at 13:05

## 2018-12-03 RX ADMIN — GABAPENTIN 400 MG: 400 CAPSULE ORAL at 21:09

## 2018-12-03 RX ADMIN — Medication 10 ML: at 14:00

## 2018-12-03 RX ADMIN — POTASSIUM CHLORIDE 20 MEQ: 200 INJECTION, SOLUTION INTRAVENOUS at 17:24

## 2018-12-03 RX ADMIN — SODIUM CHLORIDE, SODIUM LACTATE, POTASSIUM CHLORIDE, AND CALCIUM CHLORIDE 150 ML/HR: 600; 310; 30; 20 INJECTION, SOLUTION INTRAVENOUS at 19:36

## 2018-12-03 RX ADMIN — SODIUM CHLORIDE, SODIUM LACTATE, POTASSIUM CHLORIDE, AND CALCIUM CHLORIDE 150 ML/HR: 600; 310; 30; 20 INJECTION, SOLUTION INTRAVENOUS at 10:13

## 2018-12-03 RX ADMIN — POTASSIUM CHLORIDE 40 MEQ: 20 TABLET, EXTENDED RELEASE ORAL at 08:01

## 2018-12-03 RX ADMIN — ENOXAPARIN SODIUM 40 MG: 40 INJECTION SUBCUTANEOUS at 17:24

## 2018-12-03 RX ADMIN — FOLIC ACID 1 MG: 1 TABLET ORAL at 08:01

## 2018-12-03 RX ADMIN — GABAPENTIN 400 MG: 400 CAPSULE ORAL at 17:24

## 2018-12-03 RX ADMIN — ASPIRIN 81 MG: 81 TABLET, COATED ORAL at 08:01

## 2018-12-03 NOTE — H&P
HOSPITALIST H&P/CONSULTNAME:  Fartun Enciso Age:  66 y.o. 
:   1940 MRN:   [id number] PCP: Jhonyn Mckeon MD 
Consulting MD: Treatment Team: Attending Provider: Crys Esquivel DO; Care Manager: Suzi Bacon RN; Utilization Review: Karoline Oar 
HPI:  
 
67 yo CM with past history of HTN, multiple chronic joint problems s/p multiple surgeries, s/p cholecystectomy, MDD (requires phlebotomy for iron overload), and glaucoma presents with a 2-day history of intractable nausea/vomiting and inability to keep food down since eating soup on Friday. Patient has had a SBO in 2018 that was treated conservatively with NG tube. Patient did try to drink a Coke this morning and had vomiting afterwards. He had a small bowel movement yesterday. No fevers/chills, chest pain, headache, BRBPR, melena. ED Course: KUB showing dilated bowel with air-fluid levels suggestive of SBO. CBC showing mildly elevated WBC count of 14 and (known) chronic anemia. Patient receiving aerosolized lidocaine prior to insertion of NG tube Interval History (12/3): patient examined at bedside. No acute events overnight. NG tube in place to suction with ~1 liter of brown output. No fevers/chills, abdominal pain, shortness of breath, chest pain. Has not passed gas or had a bowel movement yet. Patient anxious about what to do next. Complete ROS done and is as stated in HPI or otherwise negative Past Medical History:  
Diagnosis Date  Anemia  Glaucoma  History of small bowel obstruction 2018  
 releived with bowel rest and NGT  
 HTN (hypertension)  Hypercholesterolemia  Iron overload   
 regular phlebotomy  Macular degeneration  Myelodysplastic syndrome ([facility] [address]  Osteoarthritis of multiple joints  Peripheral neuropathy 2010  RLS (restless legs syndrome)  Sleep apnea  Spondylolisthesis, acquired 2010 Past Surgical History:  
Procedure Laterality Date  HX CARPAL TUNNEL RELEASE Right   HX CATARACT REMOVAL Bilateral 2017  HX CERVICAL DISKECTOMY  2002  HX CERVICAL FUSION   &   
 cervical plate placement  HX CHOLECYSTECTOMY  14  HX COLONOSCOPY    HX LUMBAR LAMINECTOMY  9/2/10  
 lumbar laminectomy L4 x 2/ rods  HX ORTHOPAEDIC Right   
 ankle fusion with hardware  HX ORTHOPAEDIC Left 2011  
 fusion of bones in foot  HX ORTHOPAEDIC  2013  
 rods in back  HX THORACIC LAMINECTOMY  09 T12, L2,L3  
 NEUROLOGICAL PROCEDURE UNLISTED   T10-T12 fusion  TOTAL HIP ARTHROPLASTY Bilateral   
 R in , l in   TOTAL KNEE ARTHROPLASTY Left 2003  TOTAL KNEE ARTHROPLASTY Right 2009 Prior to Admission Medications Prescriptions Last Dose Informant Patient Reported? Taking?  
aspirin delayed-release 81 mg tablet   Yes No  
Sig: Take  by mouth daily. cholecalciferol (VITAMIN D3) 1,000 unit tablet   Yes No  
Sig: Take 1,000 Units by mouth daily. cpap machine kit   Yes No  
Sig: by Does Not Apply route. 00WL  
folic acid (FOLVITE) 1 mg tablet   Yes No  
Sig: Take  by mouth daily. gabapentin (NEURONTIN) 400 mg capsule   No No  
Si po tid  
latanoprost (XALATAN) 0.005 % ophthalmic solution   Yes No  
Sig: Administer 1 Drop to both eyes nightly. Indications: OPEN ANGLE GLAUCOMA  
lisinopril-hydroCHLOROthiazide (ZESTORETIC) 20-25 mg per tablet   No No  
Si po qd  
meloxicam (MOBIC) 15 mg tablet   No No  
Si po qd  
multivit-min/FA/lycopen/lutein (CENTRUM SILVER MEN PO)   Yes No  
Sig: Take  by mouth daily. omega 3-dha-epa-fish oil (FISH OIL) 100-160-1,000 mg cap   Yes No  
Sig: Take  by mouth daily.   
pramipexole (MIRAPEX) 0.5 mg tablet   No No  
Si po 5 times daily  
simvastatin (ZOCOR) 20 mg tablet   No No  
Si po qd  
triamcinolone acetonide (KENALOG) 0.1 % topical cream   Yes No  
 Sig: Apply  to affected area two (2) times daily as needed for Skin Irritation. use thin layer  
vit C/E/Zn/coppr/lutein/zeaxan (PRESERVISION AREDS-2 PO)   Yes No  
Sig: Take  by mouth. Facility-Administered Medications: None Allergies Allergen Reactions  Flagyl [Metronidazole] Other (comments) Pt states burning sensation all over body.  Azithromycin Unknown (comments) Severe headache  Gadolinium-Containing Contrast Media Other (comments) Severe pain  Macrolide Antibiotics Rash  Penicillins Rash  Quinolones Rash  Roxicodone [Oxycodone] Rash  Ultram [Tramadol] Rash Social History Tobacco Use  Smoking status: Former Smoker Packs/day: 1.00 Years: 8.00 Pack years: 8.00 Last attempt to quit: 1967 Years since quittin.9  Smokeless tobacco: Never Used Substance Use Topics  Alcohol use: Yes Alcohol/week: 7.0 oz Types: 14 Cans of beer per week Comment: week Family History Problem Relation Age of Onset  Heart Disease Mother   
     carotid enarterectomy  Cancer Mother   
     hx of lymph node  Arthritis-osteo Brother  Lung Disease Brother COPD  Cancer Father   
     prostate Objective:  
 
Visit Vitals /70 Pulse 87 Temp 98.5 °F (36.9 °C) Resp 16 Ht 4' 11\" (1.499 m) Wt 74.8 kg (165 lb) SpO2 95% BMI 33.33 kg/m² Temp (24hrs), Av.5 °F (36.9 °C), Min:98.3 °F (36.8 °C), Max:98.7 °F (37.1 °C) Oxygen Therapy O2 Sat (%): 95 % (18 0816) Pulse via Oximetry: 84 beats per minute (18 1436) O2 Device: Room air (18 0800) Physical Exam: 
General:    Alert, cooperative, no distress, appears stated age. Head:   Normocephalic, without obvious abnormality, atraumatic. Nose:  Nares normal. No drainage or sinus tenderness. Lungs:   Clear to auscultation bilaterally. No Wheezing or Rhonchi. No rales. Heart:   Regular rate and rhythm,  no murmur, rub or gallop. Abdomen:   Interval decrease in abdominal distention, hypoactive bowel sounds, no TTP, no rebound tenderness Extremities: No cyanosis. No edema. No clubbing Skin:     Texture, turgor normal. No rashes or lesions. Not Jaundiced Neurologic: Alert and oriented x 3, no focal deficits Data Review:  
Recent Results (from the past 24 hour(s)) CBC WITH AUTOMATED DIFF Collection Time: 12/02/18 12:29 PM  
Result Value Ref Range WBC 14.2 (H) 4.3 - 11.1 K/uL  
 RBC 3.22 (L) 4.23 - 5.6 M/uL  
 HGB 10.6 (L) 13.6 - 17.2 g/dL HCT 31.1 (L) 41.1 - 50.3 % MCV 96.6 79.6 - 97.8 FL  
 MCH 32.9 26.1 - 32.9 PG  
 MCHC 34.1 31.4 - 35.0 g/dL  
 RDW 14.9 (H) 11.9 - 14.6 % PLATELET 467 460 - 772 K/uL MPV 9.7 9.4 - 12.3 FL ABSOLUTE NRBC 0.00 0.0 - 0.2 K/uL  
 DF AUTOMATED NEUTROPHILS 89 (H) 43 - 78 % LYMPHOCYTES 6 (L) 13 - 44 % MONOCYTES 5 4.0 - 12.0 % EOSINOPHILS 0 (L) 0.5 - 7.8 % BASOPHILS 0 0.0 - 2.0 % IMMATURE GRANULOCYTES 1 0.0 - 5.0 %  
 ABS. NEUTROPHILS 12.6 (H) 1.7 - 8.2 K/UL  
 ABS. LYMPHOCYTES 0.8 0.5 - 4.6 K/UL  
 ABS. MONOCYTES 0.7 0.1 - 1.3 K/UL  
 ABS. EOSINOPHILS 0.0 0.0 - 0.8 K/UL  
 ABS. BASOPHILS 0.0 0.0 - 0.2 K/UL  
 ABS. IMM. GRANS. 0.1 0.0 - 0.5 K/UL METABOLIC PANEL, COMPREHENSIVE Collection Time: 12/02/18 12:29 PM  
Result Value Ref Range Sodium 132 (L) 136 - 145 mmol/L Potassium 4.1 3.5 - 5.1 mmol/L Chloride 95 (L) 98 - 107 mmol/L  
 CO2 26 21 - 32 mmol/L Anion gap 11 7 - 16 mmol/L Glucose 137 (H) 65 - 100 mg/dL BUN 34 (H) 8 - 23 MG/DL Creatinine 1.30 0.8 - 1.5 MG/DL  
 GFR est AA >60 >60 ml/min/1.73m2 GFR est non-AA 57 (L) >60 ml/min/1.73m2 Calcium 9.6 8.3 - 10.4 MG/DL Bilirubin, total 0.7 0.2 - 1.1 MG/DL  
 ALT (SGPT) 33 12 - 65 U/L  
 AST (SGOT) 22 15 - 37 U/L Alk. phosphatase 75 50 - 136 U/L Protein, total 7.5 6.3 - 8.2 g/dL Albumin 4.5 3.2 - 4.6 g/dL Globulin 3.0 2.3 - 3.5 g/dL A-G Ratio 1.5 1.2 - 3.5 METABOLIC PANEL, BASIC Collection Time: 12/03/18  4:28 AM  
Result Value Ref Range Sodium 133 (L) 136 - 145 mmol/L Potassium 3.3 (L) 3.5 - 5.1 mmol/L Chloride 97 (L) 98 - 107 mmol/L  
 CO2 29 21 - 32 mmol/L Anion gap 7 7 - 16 mmol/L Glucose 117 (H) 65 - 100 mg/dL BUN 32 (H) 8 - 23 MG/DL Creatinine 0.96 0.8 - 1.5 MG/DL  
 GFR est AA >60 >60 ml/min/1.73m2 GFR est non-AA >60 >60 ml/min/1.73m2 Calcium 9.0 8.3 - 10.4 MG/DL  
CBC WITH AUTOMATED DIFF Collection Time: 12/03/18  4:28 AM  
Result Value Ref Range WBC 12.5 (H) 4.3 - 11.1 K/uL  
 RBC 2.89 (L) 4.23 - 5.6 M/uL HGB 9.4 (L) 13.6 - 17.2 g/dL HCT 27.9 (L) 41.1 - 50.3 % MCV 96.5 79.6 - 97.8 FL  
 MCH 32.5 26.1 - 32.9 PG  
 MCHC 33.7 31.4 - 35.0 g/dL  
 RDW 14.9 (H) 11.9 - 14.6 % PLATELET 237 951 - 618 K/uL MPV 9.9 9.4 - 12.3 FL ABSOLUTE NRBC 0.00 0.0 - 0.2 K/uL  
 DF AUTOMATED NEUTROPHILS 89 (H) 43 - 78 % LYMPHOCYTES 4 (L) 13 - 44 % MONOCYTES 6 4.0 - 12.0 % EOSINOPHILS 0 (L) 0.5 - 7.8 % BASOPHILS 0 0.0 - 2.0 % IMMATURE GRANULOCYTES 1 0.0 - 5.0 %  
 ABS. NEUTROPHILS 11.1 (H) 1.7 - 8.2 K/UL  
 ABS. LYMPHOCYTES 0.5 0.5 - 4.6 K/UL  
 ABS. MONOCYTES 0.7 0.1 - 1.3 K/UL  
 ABS. EOSINOPHILS 0.0 0.0 - 0.8 K/UL  
 ABS. BASOPHILS 0.0 0.0 - 0.2 K/UL  
 ABS. IMM. GRANS. 0.1 0.0 - 0.5 K/UL MAGNESIUM Collection Time: 12/03/18  4:28 AM  
Result Value Ref Range Magnesium 2.1 1.8 - 2.4 mg/dL [facility] Sat Assessment and Plan: Active Hospital Problems Diagnosis Date Noted  [facility] 12/02/2018  
 [facility]BO [facility] 12/02/2018  Myelodysplastic syndrome ([facility] [address]  Dyslipidemia 07/02/2015  
 HTN (hypertension) 04/09/2014 PLAN 
· Keep NG tube to suction · Once patient passes gas or has a bowel movement, can clamp NG tube and try clear liquid diet · If tolerates clear liquid diet, discontinue NG tube · If worsening symptoms or unable to tolerate diet or if patient is not clinically improving by tomorrow, will consult surgery · Patient with recent (unremarkable) EGD/colonscopy as outpatient · Continue home meds as tolerated · KCl 40 mEq IV to replenish hypokalemia, likely resultant from N/V 
 
F/E/N: fluids as above, replete electrolytes as needed, NPO for now Ppx: Lovenox for VTE Code Status: FULL CODE Disposition: pending workup as above, not anticipating any post-discharge needs following hospitalization Signed By: Judith Ivy DO   
 December 3, 2018

## 2018-12-03 NOTE — PROGRESS NOTES
Hospitalist Progress Note 12/3/2018 Admit Date: 2018 12:58 PM  
NAME: Magdalena Avelar :  1940 MRN:  [id number] Attending: Almas Julio DO 
PCP:  Hilda Washington MD 
 
SUBJECTIVE:  
67 yo CM with past history of HTN, multiple chronic joint problems s/p multiple surgeries, s/p cholecystectomy, MDD (requires phlebotomy for iron overload), and glaucoma presents with a 2-day history of intractable nausea/vomiting and inability to keep food down since eating soup on Friday. Patient has had a SBO in 2018 that was treated conservatively with NG tube. Patient did try to drink a Coke this morning and had vomiting afterwards. He had a small bowel movement yesterday. No fevers/chills, chest pain, headache, BRBPR, melena. 
  
ED Course: KUB showing dilated bowel with air-fluid levels suggestive of SBO. CBC showing mildly elevated WBC count of 14 and (known) chronic anemia. Patient receiving aerosolized lidocaine prior to insertion of NG tube 
  
Interval History (12/3): patient examined at bedside. No acute events overnight. NG tube in place to suction with ~1 liter of brown output. No fevers/chills, abdominal pain, shortness of breath, chest pain. Has not passed gas or had a bowel movement yet. Patient anxious about what to do next. Review of Systems negative with exception of pertinent positives noted above PHYSICAL EXAM  
 
Visit Vitals /71 Pulse 84 Temp 98.7 °F (37.1 °C) Resp 16 Ht 4' 11\" (1.499 m) Wt 74.8 kg (165 lb) SpO2 93% BMI 33.33 kg/m² Temp (24hrs), Av.5 °F (36.9 °C), Min:98.3 °F (36.8 °C), Max:98.7 °F (37.1 °C) Oxygen Therapy O2 Sat (%): 93 % (18 1112) Pulse via Oximetry: 84 beats per minute (18 1436) O2 Device: Room air (18 0800) Intake/Output Summary (Last 24 hours) at 12/3/2018 5767 Last data filed at 12/3/2018 1336 Gross per 24 hour Intake 1956 ml Output 1300 ml Net 656 ml  
  
 General:          Alert, cooperative, no distress, appears stated age. Head:               Normocephalic, without obvious abnormality, atraumatic. Nose:               Nares normal. No drainage or sinus tenderness. Lungs:             Clear to auscultation bilaterally. No Wheezing or Rhonchi. No rales. Heart:              Regular rate and rhythm,  no murmur, rub or gallop. Abdomen:        Interval decrease in abdominal distention, hypoactive bowel sounds, no TTP, no rebound tenderness Extremities:     No cyanosis. No edema. No clubbing Skin:                Texture, turgor normal. No rashes or lesions. Not Jaundiced Neurologic:      Alert and oriented x 3, no focal deficits ASSESSMENT Active Hospital Problems Diagnosis Date Noted  Small bowel obstruction ([address] 12/02/2018  
 SBO (small bowel obstruction) ([facility] [address] 12/02/2018  Myelodysplastic syndrome ([facility] [address]  Dyslipidemia 07/02/2015  
 HTN (hypertension) 04/09/2014 Plan: · Keep NG tube to suction · Once patient passes gas or has a bowel movement, can clamp NG tube and try clear liquid diet · If tolerates clear liquid diet, discontinue NG tube · If worsening symptoms or unable to tolerate diet or if patient is not clinically improving by tomorrow, will consult surgery · Patient with recent (unremarkable) EGD/colonscopy as outpatient · Continue home meds as tolerated · KCl 40 mEq IV to replenish hypokalemia, likely resultant from N/V 
  
F/E/N: fluids as above, replete electrolytes as needed, NPO for now 
  
Ppx: Lovenox for VTE 
  
Code Status: FULL CODE 
  
Disposition: pending workup as above, not anticipating any post-discharge needs following hospitalization Signed By: Jj Li DO   
 December 3, 2018

## 2018-12-03 NOTE — PROGRESS NOTES
END OF SHIFT NOTE: 
 
INTAKE/OUTPUT 
12/02 0701 - 12/03 0700 In: 1956 [I.V.:1956] Out: 500 Voiding: YES Catheter: NO 
Drain:  
Nasogastric Tube 12/02/18 (Active) Drainage Chamber Level (ml) 500 ml 12/3/2018  3:20 AM  
Output (ml) 200 ml 12/3/2018  3:20 AM  
 
 
 
 
 
 
Flatus: Patient does not have flatus present. Stool:  0 occurrences. Characteristics: 
  
Emesis: 0 occurrences. Characteristics: VITAL SIGNS Patient Vitals for the past 12 hrs: 
 Temp Pulse Resp BP SpO2  
12/03/18 0400 98.4 °F (36.9 °C) 87 16 125/68 94 % 12/02/18 2348 98.3 °F (36.8 °C) 87 18 104/65 93 % 12/02/18 2000 98.7 °F (37.1 °C) 89 18 132/74 95 % Pain Assessment Pain Intensity 1: 3 (12/02/18 1227) Patient Stated Pain Goal: 0 Ambulating Yes with help. Declines pain/nausea meds when offered. Having difficulty starting stream at times; unable to use urinal for urine measurement. Shift report given to oncoming nurse at the bedside.  
 
Loli Cartagena, RN

## 2018-12-03 NOTE — PROGRESS NOTES
Initial visit to assess pt's spiritual needs. Ministry of presence & prayer to demonstrate caring & concern, convey emotional & spiritual support.  
 
 Carmen Alvarez MDiv,ThM,PhD

## 2018-12-03 NOTE — PROGRESS NOTES
Chart screened by  for discharge planning. No needs identified at this time. Please consult  if any new issues arise. Care Management Interventions Plan discussed with Pt/Family/Caregiver: No

## 2018-12-04 ENCOUNTER — APPOINTMENT (OUTPATIENT)
Dept: GENERAL RADIOLOGY | Age: 78
DRG: 355 | End: 2018-12-04
Attending: INTERNAL MEDICINE
Payer: MEDICARE

## 2018-12-04 LAB — LACTATE SERPL-SCNC: 0.9 MMOL/L (ref 0.4–2)

## 2018-12-04 PROCEDURE — 74011250637 HC RX REV CODE- 250/637: Performed by: INTERNAL MEDICINE

## 2018-12-04 PROCEDURE — 74018 RADEX ABDOMEN 1 VIEW: CPT

## 2018-12-04 PROCEDURE — 65270000029 HC RM PRIVATE

## 2018-12-04 PROCEDURE — 83605 ASSAY OF LACTIC ACID: CPT

## 2018-12-04 PROCEDURE — 36415 COLL VENOUS BLD VENIPUNCTURE: CPT

## 2018-12-04 PROCEDURE — 77030020255 HC SOL INJ LR 1000ML BG

## 2018-12-04 PROCEDURE — 74011250636 HC RX REV CODE- 250/636: Performed by: INTERNAL MEDICINE

## 2018-12-04 PROCEDURE — 74011250636 HC RX REV CODE- 250/636: Performed by: EMERGENCY MEDICINE

## 2018-12-04 RX ORDER — POTASSIUM CHLORIDE 14.9 MG/ML
20 INJECTION INTRAVENOUS EVERY 6 HOURS
Status: COMPLETED | OUTPATIENT
Start: 2018-12-04 | End: 2018-12-05

## 2018-12-04 RX ADMIN — SODIUM CHLORIDE, SODIUM LACTATE, POTASSIUM CHLORIDE, AND CALCIUM CHLORIDE 150 ML/HR: 600; 310; 30; 20 INJECTION, SOLUTION INTRAVENOUS at 10:47

## 2018-12-04 RX ADMIN — ASPIRIN 81 MG: 81 TABLET, COATED ORAL at 09:26

## 2018-12-04 RX ADMIN — PRAMIPEXOLE DIHYDROCHLORIDE 0.5 MG: 0.25 TABLET ORAL at 21:39

## 2018-12-04 RX ADMIN — ENOXAPARIN SODIUM 40 MG: 40 INJECTION SUBCUTANEOUS at 17:28

## 2018-12-04 RX ADMIN — LISINOPRIL AND HYDROCHLOROTHIAZIDE 1 TABLET: 25; 20 TABLET ORAL at 09:26

## 2018-12-04 RX ADMIN — SODIUM CHLORIDE, SODIUM LACTATE, POTASSIUM CHLORIDE, AND CALCIUM CHLORIDE 150 ML/HR: 600; 310; 30; 20 INJECTION, SOLUTION INTRAVENOUS at 01:10

## 2018-12-04 RX ADMIN — PRAMIPEXOLE DIHYDROCHLORIDE 0.5 MG: 0.25 TABLET ORAL at 05:28

## 2018-12-04 RX ADMIN — VITAMIN D, TAB 1000IU (100/BT) 1000 UNITS: 25 TAB at 09:26

## 2018-12-04 RX ADMIN — SIMVASTATIN 20 MG: 20 TABLET, FILM COATED ORAL at 21:39

## 2018-12-04 RX ADMIN — GABAPENTIN 400 MG: 400 CAPSULE ORAL at 21:40

## 2018-12-04 RX ADMIN — GABAPENTIN 400 MG: 400 CAPSULE ORAL at 09:26

## 2018-12-04 RX ADMIN — PRAMIPEXOLE DIHYDROCHLORIDE 0.5 MG: 0.25 TABLET ORAL at 17:28

## 2018-12-04 RX ADMIN — LATANOPROST 1 DROP: 50 SOLUTION OPHTHALMIC at 21:41

## 2018-12-04 RX ADMIN — FOLIC ACID 1 MG: 1 TABLET ORAL at 09:26

## 2018-12-04 RX ADMIN — POTASSIUM CHLORIDE 20 MEQ: 200 INJECTION, SOLUTION INTRAVENOUS at 22:30

## 2018-12-04 RX ADMIN — GABAPENTIN 400 MG: 400 CAPSULE ORAL at 17:28

## 2018-12-04 RX ADMIN — POTASSIUM CHLORIDE 20 MEQ: 200 INJECTION, SOLUTION INTRAVENOUS at 17:55

## 2018-12-04 RX ADMIN — PRAMIPEXOLE DIHYDROCHLORIDE 0.5 MG: 0.25 TABLET ORAL at 10:47

## 2018-12-04 RX ADMIN — PRAMIPEXOLE DIHYDROCHLORIDE 0.5 MG: 0.25 TABLET ORAL at 13:46

## 2018-12-04 RX ADMIN — SODIUM CHLORIDE, SODIUM LACTATE, POTASSIUM CHLORIDE, AND CALCIUM CHLORIDE 150 ML/HR: 600; 310; 30; 20 INJECTION, SOLUTION INTRAVENOUS at 17:00

## 2018-12-04 RX ADMIN — Medication 10 ML: at 14:00

## 2018-12-04 NOTE — CONSULTS
H&P/Consult Note/Progress Note/Office Note:   Td Martin  MRN: [id number]  :1940  Age:78 y.o.    HPI: Td Martin is a 66 y.o. male who we are asked by Dr. Lester Roche to see for a small bowel obstruction. The patient was admitted on 18 after reporting to the ER with a 2 day history of nausea and vomiting. A KUB was obtained that is concerning for a SBO. No CT has been obtained. He has a PMHx of HTN, multiple chronic joint problems, s/p cholecystectomy, MDD (requires phlebotomy for iron overload), and glaucoma. He states he had a similar episode in 2018 that reserved with conservative measures. He said after his last SBO he had extensive work-up to include a colonoscopy, and EGD reportedly without any acute findings. A CTAP was obtained  with full thickening of the distal small bowel loops with injection of the mesentery noted (full results listed below). At present his abdomen is soft and non-tender. He has a NGT inserted with brown output noted in canister. WBC is 12.5, Na 133, K+3.3. He denies fever or chills. He denies constipation or diarrhea.        Past Medical History:   Diagnosis Date    Anemia     Glaucoma     History of small bowel obstruction 2018    releived with bowel rest and NGT    HTN (hypertension)     Hypercholesterolemia     Iron overload     regular phlebotomy    Macular degeneration     Myelodysplastic syndrome (HCC)     Osteoarthritis of multiple joints     Peripheral neuropathy 2010    RLS (restless legs syndrome)     Sleep apnea     Spondylolisthesis, acquired 2010     Past Surgical History:   Procedure Laterality Date    HX CARPAL TUNNEL RELEASE Right     HX CATARACT REMOVAL Bilateral 2017    HX CERVICAL DISKECTOMY  2002    HX CERVICAL FUSION   &     cervical plate placement     HX CHOLECYSTECTOMY  14    HX COLONOSCOPY      HX LUMBAR LAMINECTOMY  9/2/10    lumbar laminectomy L4 x 2/ rods    HX ORTHOPAEDIC Right 1985    ankle fusion with hardware     HX ORTHOPAEDIC Left 2/14/2011    fusion of bones in foot    HX ORTHOPAEDIC  1/2013    rods in back     HX THORACIC LAMINECTOMY  4/2/09    T12, L2,L3    NEUROLOGICAL PROCEDURE UNLISTED  2013    T10-T12 fusion    TOTAL HIP ARTHROPLASTY Bilateral     R in 2003, l in 2014   [address] Left 12/8/2003    TOTAL KNEE ARTHROPLASTY Right 12/7/2009     Current Facility-Administered Medications   Medication Dose Route Frequency    lip protectant (BLISTEX) ointment   Topical PRN    potassium chloride 20 mEq in 100 ml IVPB  20 mEq IntraVENous Q6H    lactated Ringers infusion  150 mL/hr IntraVENous CONTINUOUS    sodium chloride (NS) flush 5-10 mL  5-10 mL IntraVENous Q8H    sodium chloride (NS) flush 5-10 mL  5-10 mL IntraVENous PRN    acetaminophen (TYLENOL) tablet 650 mg  650 mg Oral Q4H PRN    ondansetron (ZOFRAN) injection 4 mg  4 mg IntraVENous Q4H PRN    bisacodyl (DULCOLAX) tablet 5 mg  5 mg Oral DAILY PRN    enoxaparin (LOVENOX) injection 40 mg  40 mg SubCUTAneous Q24H    aspirin delayed-release tablet 81 mg  81 mg Oral DAILY    cholecalciferol (VITAMIN D3) tablet 1,000 Units  1,000 Units Oral DAILY    folic acid (FOLVITE) tablet 1 mg  1 mg Oral DAILY    gabapentin (NEURONTIN) capsule 400 mg  400 mg Oral TID    latanoprost (XALATAN) 0.005 % ophthalmic solution 1 Drop  1 Drop Both Eyes QHS    lisinopril-hydroCHLOROthiazide (PRINZIDE, ZESTORETIC) 20-25 mg per tablet 1 Tab  1 Tab Oral DAILY    pramipexole (MIRAPEX) tablet 0.5 mg  0.5 mg Oral 5XD    simvastatin (ZOCOR) tablet 20 mg  20 mg Oral DAILY     Flagyl [metronidazole]; Azithromycin; Gadolinium-containing contrast media; Macrolide antibiotics; Penicillins; Quinolones;  Roxicodone [oxycodone]; and Ultram [tramadol]  Social History     Socioeconomic History    Marital status:      Spouse name: Not on file    Number of children: 3    Years of education: Not on file   Belinda Highest education level: Not on file   Tobacco Use    Smoking status: Former Smoker     Packs/day: 1.00     Years: 8.00     Pack years: 8.00     Last attempt to quit: 1967     Years since quittin.9    Smokeless tobacco: Never Used   Substance and Sexual Activity    Alcohol use: Yes     Alcohol/week: 7.0 oz     Types: 14 Cans of beer per week     Comment: week    Drug use: No    Sexual activity: Not Currently   Other Topics Concern    Special Diet No    Exercise No     Social History     Tobacco Use   Smoking Status Former Smoker    Packs/day: 1.00    Years: 8.00    Pack years: 8.00    Last attempt to quit: 1967    Years since quittin.9   Smokeless Tobacco Never Used     Family History   Problem Relation Age of Onset    Heart Disease Mother         carotid enarterectomy    Cancer Mother         hx of lymph node    Arthritis-osteo Brother     Lung Disease Brother         COPD    Cancer Father         prostate     ROS: The patient has no difficulty with chest pain or shortness of breath. No fever or chills. Comprehensive review of systems was otherwise unremarkable except as noted above. Physical Exam:   Visit Vitals  /59 (BP 1 Location: Left arm, BP Patient Position: Sitting)   Pulse 83   Temp 98.2 °F (36.8 °C)   Resp 17   Ht 4' 11\" (1.499 m)   Wt 165 lb (74.8 kg)   SpO2 95%   BMI 33.33 kg/m²     Constitutional: Alert, oriented, cooperative patient in no acute distress; appears stated age    Eyes:Sclera are clear. EOMs intact  ENMT: no external lesions gross hearing normal; no obvious neck masses, no ear or lip lesions, nares normal +NGT  CV: RRR. Normal perfusion  Resp: No JVD. Breathing is  non-labored; no audible wheezing. GI: soft and non-distended, non tender, +umbilical hernia     Musculoskeletal: unremarkable with normal function. No embolic signs or cyanosis.    Neuro:  Oriented; moves all 4; no focal deficits  Psychiatric: normal affect and mood, no memory impairment    Recent vitals (if inpt):  Patient Vitals for the past 24 hrs:   BP Temp Pulse Resp SpO2   12/04/18 1518 109/59 98.2 °F (36.8 °C) 83 17 95 %   12/04/18 1137 134/74 98.4 °F (36.9 °C) 84 16 96 %   12/04/18 0727 117/64 98.9 °F (37.2 °C) 85 16 95 %   12/04/18 0447 112/64 98.3 °F (36.8 °C) 87 16 94 %   12/03/18 2300 128/54 98.6 °F (37 °C) 85 16 96 %   12/03/18 1930 116/70 98.6 °F (37 °C) 84 16 96 %       Labs:  Recent Labs     12/03/18  0428 12/02/18  1229   WBC 12.5* 14.2*   HGB 9.4* 10.6*    264   * 132*   K 3.3* 4.1   CL 97* 95*   CO2 29 26   BUN 32* 34*   CREA 0.96 1.30   * 137*   TBILI  --  0.7   SGOT  --  22   ALT  --  33   AP  --  75       Lab Results   Component Value Date/Time    WBC 12.5 (H) 12/03/2018 04:28 AM    HGB 9.4 (L) 12/03/2018 04:28 AM    PLATELET 005 52/92/1904 04:28 AM    Sodium 133 (L) 12/03/2018 04:28 AM    Potassium 3.3 (L) 12/03/2018 04:28 AM    Chloride 97 (L) 12/03/2018 04:28 AM    CO2 29 12/03/2018 04:28 AM    BUN 32 (H) 12/03/2018 04:28 AM    Creatinine 0.96 12/03/2018 04:28 AM    Glucose 117 (H) 12/03/2018 04:28 AM    INR 1.0 07/02/2014 01:45 PM    aPTT 31.7 07/02/2014 01:45 PM    Bilirubin, total 0.7 12/02/2018 12:29 PM    AST (SGOT) 22 12/02/2018 12:29 PM    ALT (SGPT) 33 12/02/2018 12:29 PM    Alk. phosphatase 75 12/02/2018 12:29 PM    Troponin-I <0.05 11/01/2010 11:57 AM    Troponin-I, Qt. 0.11 (H) 07/13/2014 04:25 PM       I reviewed recent labs and recent radiologic studies. CTAP 09/2018  FINDINGS:  Abdomen CT:  The extreme lung bases demonstrate bibasilar lower lobe airspace opacities and bronchiectasis. Some right middle lobe inflammatory change may be present as well. This could represent inflammatory versus airspace disease. Noncontrast views of the liver spleen and adrenals are within normal limits. Gallbladder is surgically absent. Kidneys show no hydronephrosis or intrarenal calculus. Aorta is normal in caliber.   Pancreas is unremarkable. Pelvic CT:    Small fat-containing umbilical hernia is present without complication. Dilation of the stomach and proximal half of the small bowel is noted. Transition point appears to be in the anterior upper pelvis just to the right of midline. No involvement of the adjacent fat containing umbilical hernia is noted. Diverticulosis of the sigmoid and descending colon is noted without evidence of acute diverticulitis. Bilateral hip arthroplasties are within normal limits. SI joints show degenerative change similar to prior study. Posterior fusion hardware in the lumbar spine is noted. No free fluid or gas is noted. IMPRESSION:  Mid to distal small bowel obstruction transition point in the anterior upper pelvis just to the right of midline. No relation to the uncomplicated fat-containing umbilical hernia. Inflammatory versus airspace opacities both lower lobes and the right middle lobe with an element of bronchiectasis. Reflux from the stomach into the esophagus is noted with a standing column of fluid/oral contrast in the esophagus. Patient may be at risk of aspiration. CTAP 11/6  FINDINGS: Beam hardening artifact from hip arthroplasties distorts images at  the level of the pelvis obscuring anatomic structures. Extensive beam  hardening artifact from the thoracic lumbar spine fusion distorts images throughout the abdominal cavity. Parenchymal and reticular nodular opacities and ground-glass opacities in the  lower lungs and right middle lobe with relative sparing of the lingula. Scattered linear opacities in the lower lungs and right middle lobe. No free air under the diaphragm. No evidence of ascites. Scattered air-fluid levels in the colon. No colonic distention. Appendix appears normal.  Full thickening of the distal small bowel loops with injection of the  mesentery. Bladder obscured. Prostate is enlarged but obscured.   Severe calcified plaque in the aorta and pelvic arteries. Induration of the mellitus. Unenhanced liver, spleen, pancreas unremarkable. No obstructive uropathy. Cholecystectomy. Adrenal glands unremarkable    IMPRESSION: Beam hardening artifact from extensive thoracic lumbar spine  hardware and hip arthroplasties distorts images of the abdominal pelvic  cavity. Evidence of distal small bowel enteritis with colonic ileus or diarrheal  disease. Evidence of mesenteric edema in the right lower quadrant. Appendix  appears normal.  Acute bronchiolitis, pneumonitis and atelectasis of the right and left lower  lungs and right middle lobe. No obstructive uropathy. Prostate enlargement. Periumbilical eventration. Cholecystectomy. Additional findings as noted above. XR Results (most recent):  Results from Hospital Encounter encounter on 12/02/18   XR ABD (KUB)    Narrative KUB supine single portable view    History:  NGT placement, 66 years Male    Comparison:  KUB 2 days ago    Findings: Esophageal tube appears to terminate in the region of the gastric  body. Again visualized are multiple mildly dilated loops of small bowel  throughout the abdomen, consistent with persistent mechanical small bowel  obstruction. No free air is evident. No suspicious renal or ureteral calculi  are evident. Extensive thoracolumbar posterior fusion hardware is again noted. Visualized osseous structures otherwise unremarkable. Impression Impression: New esophageal tube appears to terminate in the region of the  gastric body. I independently reviewed radiology images for studies I described above or studies I have ordered.    Admission date (for inpatients): 12/2/2018   * No surgery found *  * No surgery found *    ASSESSMENT/PLAN:  Problem List  Date Reviewed: 11/6/2018          Codes Class Noted    Small bowel obstruction ([facility] [address] ICD-10-CM: V49.863  ICD-9-CM: 560.9  12/2/2018        * (Principal) SBO (small bowel obstruction) ([address] ICD-10-CM: Y68.058  ICD-9-CM: 560.9  12/2/2018        Iron overload ICD-10-CM: E83.19  ICD-9-CM: 275.09  6/29/2016        RADHA on CPAP ICD-10-CM: G47.33, Z99.89  ICD-9-CM: 327.23, V46.8  1/20/2016        Myelodysplastic syndrome ([facility] ICD-10-CM: D46.9  ICD-9-CM: 238.75  Unknown        Dyslipidemia ICD-10-CM: E78.5  ICD-9-CM: 272.4  7/2/2015        Osteoarthritis of multiple joints ICD-10-CM: M15.9  ICD-9-CM: 715.89  7/2/2015        Restless leg ICD-10-CM: G25.81  ICD-9-CM: 333.94  7/2/2015        HTN (hypertension) ICD-10-CM: I10  ICD-9-CM: 401.9  4/9/2014        Thoracic myelopathy ICD-10-CM: M47.14  ICD-9-CM: 721.41 Present on Admission 1/29/2013        Anemia of renal disease ICD-10-CM: D63.1  ICD-9-CM: 285.21  12/20/2010    Overview Addendum 9/6/2013 10:12 AM by Angelina Foreman     Low epo levels on procrit response   9-06-13 no need for procrit for some time hemoglobin 10 .4 check monthly see 6 monthly             Iron deficiency ICD-10-CM: E61.1  ICD-9-CM: 280.9  11/19/2010    Overview Addendum 9/6/2013 10:11 AM by Kristal, [address] [place] [address] 11/23/2010 11/23/2010   Day, Cycle Day 1, Cycle 1    ferumoxytol (FERAHEME) IV     iron dextran complex 50 mg/mL (INFED) IV [ 25 mg ] 1,000 mg     Supportive Care Flowsheet 5/28/2012 6/4/2012   Day, Cycle Day 1, Cycle 1 Day 8, Cycle 1   ferumoxytol (FERAHEME)  mg 510 mg   iron dextran complex 50 mg/mL (INFED) IV     5-2012 repeated   11-07-12 low iron stores regular follow up transferrin saturation  And replacement  9-06-13 iron stores still good  Results for Amy Hull () as of 9/6/2013 10:09   9/6/2013    Iron 76   TIBC 262   Transferrin Saturation 29   Ferritin 1203 (H)                Acute kidney injury (HCC) ICD-10-CM: N17.9  ICD-9-CM: 584.9  11/1/2010            Principal Problem:    SBO (small bowel obstruction) ([facility] (12/2/2018)    Active Problems:    HTN (hypertension) (4/9/2014)      Dyslipidemia (7/2/2015)      Myelodysplastic syndrome ([facility] ()      Small bowel obstruction ([facility] [address] (12/2/2018)    Continue care per primary  NPO/NGT for bowel rest  Obtain CT to better evaluate  Hopeful for conservative management    Signed:  Phylicia Rosa NP

## 2018-12-04 NOTE — PROGRESS NOTES
Hospitalist Progress Note 2018 Admit Date: 2018 12:58 PM  
NAME: Nuris Abrams :  1940 MRN:  [id number] Attending: Brandy Reid DO 
PCP:  Unique Andres MD 
 
SUBJECTIVE: As Per Prior Notes: 
\" 
67 yo CM with past history of HTN, multiple chronic joint problems s/p multiple surgeries, s/p cholecystectomy, MDD (requires phlebotomy for iron overload), and glaucoma presents with a 2-day history of intractable nausea/vomiting and inability to keep food down since eating soup on Friday. Patient has had a SBO in 2018 that was treated conservatively with NG tube. Patient did try to drink a Coke this morning and had vomiting afterwards. He had a small bowel movement yesterday. No fevers/chills, chest pain, headache, BRBPR, melena. 
  
ED Course: KUB showing dilated bowel with air-fluid levels suggestive of SBO. CBC showing mildly elevated WBC count of 14 and (known) chronic anemia. Patient receiving aerosolized lidocaine prior to insertion of NG tube 
  
Interval History (12/3): patient examined at bedside. No acute events overnight. NG tube in place to suction with ~1 liter of brown output. No fevers/chills, abdominal pain, shortness of breath, chest pain. Has not passed gas or had a bowel movement yet. Patient anxious about what to do next. \" 
 
 
2018- pt seen - he and his wife are concerned about no resolution of the sbo yet- previously had it and resolved in 2 days. Review of Systems negative with exception of pertinent positives noted above PHYSICAL EXAM  
 
Visit Vitals /59 (BP 1 Location: Left arm, BP Patient Position: Sitting) Pulse 83 Temp 98.2 °F (36.8 °C) Resp 17 Ht 4' 11\" (1.499 m) Wt 74.8 kg (165 lb) SpO2 95% BMI 33.33 kg/m² Temp (24hrs), Av.5 °F (36.9 °C), Min:98.2 °F (36.8 °C), Max:98.9 °F (37.2 °C) Oxygen Therapy O2 Sat (%): 95 % (18 1518) Pulse via Oximetry: 84 beats per minute (18 1436) O2 Device: Room air (12/04/18 1500) Intake/Output Summary (Last 24 hours) at 12/4/2018 1620 Last data filed at 12/4/2018 1458 Gross per 24 hour Intake 2790 ml Output 500 ml Net 2290 ml General:          Alert, cooperative, no distress, appears stated age. Head:               Normocephalic, without obvious abnormality, atraumatic. Nose:               Nares normal. No drainage or sinus tenderness. Lungs:             Clear to auscultation bilaterally. No Wheezing or Rhonchi. No rales. Heart:              Regular rate and rhythm,  no murmur, rub or gallop. Abdomen:        Interval decrease in abdominal distention, hyperactive bowel sounds, no TTP, no rebound tenderness Extremities:     No cyanosis. No edema. No clubbing Skin:                Texture, turgor normal. No rashes or lesions. Not Jaundiced Neurologic:      Alert and oriented x 3, no focal deficits ASSESSMENT Active Hospital Problems Diagnosis Date Noted  Small bowel obstruction ([facility] [address] 12/02/2018  
 SBO (small bowel obstruction) ([facility] [address] 12/02/2018  Myelodysplastic syndrome ([facility] [address]  Dyslipidemia 07/02/2015  
 HTN (hypertension) 04/09/2014 Plan: 
· NPO for now · Continue NGT to suction · Serial abdominal x-rays · Correct potassium · Encourage movement · Check lactic acid · Get surgery consult · Patient with recent (unremarkable) EGD/colonscopy as outpatient 
  
 
Ppx: Lovenox for VTE 
  
Code Status: FULL CODE 
  
Disposition: pending workup as above, not anticipating any post-discharge needs following hospitalization Signed By: Chanda Kanner, MD   
 December 4, 2018

## 2018-12-04 NOTE — H&P (VIEW-ONLY)
H&P/Consult Note/Progress Note/Office Note:  
Angela Rust  MRN: [id number]  :1940  Age:78 y.o. 
 
HPI: Angela Rust is a 66 y.o. male who we are asked by Dr. Abundio Harmon to see for a small bowel obstruction. The patient was admitted on 18 after reporting to the ER with a 2 day history of nausea and vomiting. A KUB was obtained that is concerning for a SBO. No CT has been obtained. He has a PMHx of HTN, multiple chronic joint problems, s/p cholecystectomy, MDD (requires phlebotomy for iron overload), and glaucoma. He states he had a similar episode in 2018 that reserved with conservative measures. He said after his last SBO he had extensive work-up to include a colonoscopy, and EGD reportedly without any acute findings. A CTAP was obtained  with full thickening of the distal small bowel loops with injection of the mesentery noted (full results listed below). At present his abdomen is soft and non-tender. He has a NGT inserted with brown output noted in canister. WBC is 12.5, Na 133, K+3.3. He denies fever or chills. He denies constipation or diarrhea. Past Medical History:  
Diagnosis Date  Anemia  Glaucoma  History of small bowel obstruction 2018  
 releived with bowel rest and NGT  
 HTN (hypertension)  Hypercholesterolemia  Iron overload   
 regular phlebotomy  Macular degeneration  Myelodysplastic syndrome ([facility] [address]  Osteoarthritis of multiple joints  Peripheral neuropathy 2010  RLS (restless legs syndrome)  Sleep apnea  Spondylolisthesis, acquired 2010 Past Surgical History:  
Procedure Laterality Date  HX CARPAL TUNNEL RELEASE Right   HX CATARACT REMOVAL Bilateral 2017  HX CERVICAL DISKECTOMY  2002  HX CERVICAL FUSION   &   
 cervical plate placement  HX CHOLECYSTECTOMY  14  HX COLONOSCOPY    HX LUMBAR LAMINECTOMY  9/2/10  
 lumbar laminectomy L4 x 2/ rods  HX ORTHOPAEDIC Right 1985  
 ankle fusion with hardware  HX ORTHOPAEDIC Left 2/14/2011  
 fusion of bones in foot  HX ORTHOPAEDIC  1/2013  
 rods in back  HX THORACIC LAMINECTOMY  4/2/09 T12, L2,L3  
 NEUROLOGICAL PROCEDURE UNLISTED  2013 T10-T12 fusion  TOTAL HIP ARTHROPLASTY Bilateral   
 R in 2003, l in 2014  TOTAL KNEE ARTHROPLASTY Left 12/8/2003  TOTAL KNEE ARTHROPLASTY Right 12/7/2009 Current Facility-Administered Medications Medication Dose Route Frequency  lip protectant (BLISTEX) ointment   Topical PRN  potassium chloride 20 mEq in 100 ml IVPB  20 mEq IntraVENous Q6H  
 lactated Ringers infusion  150 mL/hr IntraVENous CONTINUOUS  
 sodium chloride (NS) flush 5-10 mL  5-10 mL IntraVENous Q8H  
 sodium chloride (NS) flush 5-10 mL  5-10 mL IntraVENous PRN  
 acetaminophen (TYLENOL) tablet 650 mg  650 mg Oral Q4H PRN  
 ondansetron (ZOFRAN) injection 4 mg  4 mg IntraVENous Q4H PRN  
 bisacodyl (DULCOLAX) tablet 5 mg  5 mg Oral DAILY PRN  
 enoxaparin (LOVENOX) injection 40 mg  40 mg SubCUTAneous Q24H  
 aspirin delayed-release tablet 81 mg  81 mg Oral DAILY  cholecalciferol (VITAMIN D3) tablet 1,000 Units  1,000 Units Oral DAILY  folic acid (FOLVITE) tablet 1 mg  1 mg Oral DAILY  gabapentin (NEURONTIN) capsule 400 mg  400 mg Oral TID  latanoprost (XALATAN) 0.005 % ophthalmic solution 1 Drop  1 Drop Both Eyes QHS  lisinopril-hydroCHLOROthiazide (PRINZIDE, ZESTORETIC) 20-25 mg per tablet 1 Tab  1 Tab Oral DAILY  pramipexole (MIRAPEX) tablet 0.5 mg  0.5 mg Oral 5XD  simvastatin (ZOCOR) tablet 20 mg  20 mg Oral DAILY Flagyl [metronidazole]; Azithromycin; Gadolinium-containing contrast media; Macrolide antibiotics; Penicillins; Quinolones; Roxicodone [oxycodone]; and Ultram [tramadol] Social History Socioeconomic History  Marital status:  Spouse name: Not on file  Number of children: 3  Years of education: Not on file  Highest education level: Not on file Tobacco Use  Smoking status: Former Smoker Packs/day: 1.00 Years: 8.00 Pack years: 8.00 Last attempt to quit: 1967 Years since quittin.9  Smokeless tobacco: Never Used Substance and Sexual Activity  Alcohol use: Yes Alcohol/week: 7.0 oz Types: 14 Cans of beer per week Comment: week  Drug use: No  
 Sexual activity: Not Currently Other Topics Concern  Special Diet No  
 Exercise No  
 
Social History Tobacco Use Smoking Status Former Smoker  Packs/day: 1.00  Years: 8.00  
 Pack years: 8.00  Last attempt to quit: 1967  Years since quittin.9 Smokeless Tobacco Never Used Family History Problem Relation Age of Onset  Heart Disease Mother   
     carotid enarterectomy  Cancer Mother   
     hx of lymph node  Arthritis-osteo Brother  Lung Disease Brother COPD  Cancer Father   
     prostate ROS: The patient has no difficulty with chest pain or shortness of breath. No fever or chills. Comprehensive review of systems was otherwise unremarkable except as noted above. Physical Exam:  
Visit Vitals /59 (BP 1 Location: Left arm, BP Patient Position: Sitting) Pulse 83 Temp 98.2 °F (36.8 °C) Resp 17 Ht 4' 11\" (1.499 m) Wt 165 lb (74.8 kg) SpO2 95% BMI 33.33 kg/m² Constitutional: Alert, oriented, cooperative patient in no acute distress; appears stated age Eyes:Sclera are clear. EOMs intact ENMT: no external lesions gross hearing normal; no obvious neck masses, no ear or lip lesions, nares normal +NGT 
CV: RRR. Normal perfusion Resp: No JVD. Breathing is  non-labored; no audible wheezing. GI: soft and non-distended, non tender, +umbilical hernia Musculoskeletal: unremarkable with normal function. No embolic signs or cyanosis. Neuro:  Oriented; moves all 4; no focal deficits Psychiatric: normal affect and mood, no memory impairment Recent vitals (if inpt): 
Patient Vitals for the past 24 hrs: 
 BP Temp Pulse Resp SpO2  
12/04/18 1518 109/59 98.2 °F (36.8 °C) 83 17 95 % 12/04/18 1137 134/74 98.4 °F (36.9 °C) 84 16 96 % 12/04/18 0727 117/64 98.9 °F (37.2 °C) 85 16 95 % 12/04/18 0447 112/64 98.3 °F (36.8 °C) 87 16 94 % 12/03/18 2300 128/54 98.6 °F (37 °C) 85 16 96 % 12/03/18 1930 116/70 98.6 °F (37 °C) 84 16 96 % Labs: 
Recent Labs 12/03/18 
0428 12/02/18 
1229 WBC 12.5* 14.2* HGB 9.4* 10.6*  264 * 132* K 3.3* 4.1 CL 97* 95* CO2 29 26 BUN 32* 34* CREA 0.96 1.30 * 137* TBILI  --  0.7 SGOT  --  22 ALT  --  33  
AP  --  75 Lab Results Component Value Date/Time WBC 12.5 (H) 12/03/2018 04:28 AM  
 HGB 9.4 (L) 12/03/2018 04:28 AM  
 PLATELET 238 83/83/2326 04:28 AM  
 Sodium 133 (L) 12/03/2018 04:28 AM  
 Potassium 3.3 (L) 12/03/2018 04:28 AM  
 Chloride 97 (L) 12/03/2018 04:28 AM  
 CO2 29 12/03/2018 04:28 AM  
 BUN 32 (H) 12/03/2018 04:28 AM  
 Creatinine 0.96 12/03/2018 04:28 AM  
 Glucose 117 (H) 12/03/2018 04:28 AM  
 INR 1.0 07/02/2014 01:45 PM  
 aPTT 31.7 07/02/2014 01:45 PM  
 Bilirubin, total 0.7 12/02/2018 12:29 PM  
 AST (SGOT) 22 12/02/2018 12:29 PM  
 ALT (SGPT) 33 12/02/2018 12:29 PM  
 Alk. phosphatase 75 12/02/2018 12:29 PM  
 Troponin-I <0.05 11/01/2010 11:57 AM  
 Troponin-I, Qt. 0.11 (H) 07/13/2014 04:25 PM  
 
 
I reviewed recent labs and recent radiologic studies. CTAP 09/2018 FINDINGS: 
Abdomen CT: The extreme lung bases demonstrate bibasilar lower lobe airspace opacities and bronchiectasis. Some right middle lobe inflammatory change may be present as well. This could represent inflammatory versus airspace disease. Noncontrast views of the liver spleen and adrenals are within normal limits. Gallbladder is surgically absent.   Kidneys show no hydronephrosis or intrarenal calculus. Aorta is normal in caliber. Pancreas is unremarkable. Pelvic CT:   
Small fat-containing umbilical hernia is present without complication. Dilation of the stomach and proximal half of the small bowel is noted. Transition point appears to be in the anterior upper pelvis just to the right of midline. No involvement of the adjacent fat containing umbilical hernia is noted. Diverticulosis of the sigmoid and descending colon is noted without evidence of acute diverticulitis. Bilateral hip arthroplasties are within normal limits. SI joints show degenerative change similar to prior study. Posterior fusion hardware in the lumbar spine is noted. No free fluid or gas is noted. IMPRESSION: 
Mid to distal small bowel obstruction transition point in the anterior upper pelvis just to the right of midline. No relation to the uncomplicated fat-containing umbilical hernia. Inflammatory versus airspace opacities both lower lobes and the right middle lobe with an element of bronchiectasis. Reflux from the stomach into the esophagus is noted with a standing column of fluid/oral contrast in the esophagus. Patient may be at risk of aspiration. CTAP 11/6 FINDINGS: Beam hardening artifact from hip arthroplasties distorts images at 
the level of the pelvis obscuring anatomic structures. Extensive beam 
hardening artifact from the thoracic lumbar spine fusion distorts images throughout the abdominal cavity. Parenchymal and reticular nodular opacities and ground-glass opacities in the 
lower lungs and right middle lobe with relative sparing of the lingula. Scattered linear opacities in the lower lungs and right middle lobe. No free air under the diaphragm. No evidence of ascites. Scattered air-fluid levels in the colon. No colonic distention. Appendix appears normal. 
Full thickening of the distal small bowel loops with injection of the 
mesentery. Bladder obscured. Prostate is enlarged but obscured. Severe calcified plaque in the aorta and pelvic arteries. Induration of the mellitus. Unenhanced liver, spleen, pancreas unremarkable. No obstructive uropathy. Cholecystectomy. Adrenal glands unremarkable IMPRESSION: Beam hardening artifact from extensive thoracic lumbar spine 
hardware and hip arthroplasties distorts images of the abdominal pelvic 
cavity. Evidence of distal small bowel enteritis with colonic ileus or diarrheal 
disease. Evidence of mesenteric edema in the right lower quadrant. Appendix 
appears normal. 
Acute bronchiolitis, pneumonitis and atelectasis of the right and left lower 
lungs and right middle lobe. No obstructive uropathy. Prostate enlargement. Periumbilical eventration. Cholecystectomy. Additional findings as noted above. XR Results (most recent): 
Results from Hospital Encounter encounter on 12/02/18 XR ABD (KUB) Narrative KUB supine single portable view History:  NGT placement, 66 years Male Comparison:  KUB 2 days ago Findings: Esophageal tube appears to terminate in the region of the gastric 
body. Again visualized are multiple mildly dilated loops of small bowel 
throughout the abdomen, consistent with persistent mechanical small bowel 
obstruction. No free air is evident. No suspicious renal or ureteral calculi 
are evident. Extensive thoracolumbar posterior fusion hardware is again noted. Visualized osseous structures otherwise unremarkable. Impression Impression: New esophageal tube appears to terminate in the region of the 
gastric body. I independently reviewed radiology images for studies I described above or studies I have ordered. Admission date (for inpatients): 12/2/2018 * No surgery found *  * No surgery found * ASSESSMENT/PLAN: 
Problem List  Date Reviewed: 11/6/2018 Codes Class Noted Small bowel obstruction ([facility] [address] ICD-10-CM: S96.648 ICD-9-CM: 560.9  12/2/2018 * (Principal) SBO (small bowel obstruction) ([facility] ICD-10-CM: I83.262 ICD-9-CM: 560.9  12/2/2018 Iron overload ICD-10-CM: E83.19 ICD-9-CM: 275.09  6/29/2016 RADHA on CPAP ICD-10-CM: G47.33, Z99.89 ICD-9-CM: 327.23, V46.8  1/20/2016 Myelodysplastic syndrome ([facility] ICD-10-CM: D46.9 ICD-9-CM: 238.75  Unknown Dyslipidemia ICD-10-CM: E78.5 ICD-9-CM: 272.4  7/2/2015 Osteoarthritis of multiple joints ICD-10-CM: M15.9 ICD-9-CM: 715.89  7/2/2015 Restless leg ICD-10-CM: G25.81 ICD-9-CM: 333.94  7/2/2015 HTN (hypertension) ICD-10-CM: I10 
ICD-9-CM: 401.9  4/9/2014 Thoracic myelopathy ICD-10-CM: M47.14 
ICD-9-CM: 721.41 Present on Admission 1/29/2013 Anemia of renal disease ICD-10-CM: D63.1 ICD-9-CM: 285.21  12/20/2010 Overview Addendum 9/6/2013 10:12 AM by Mattie Turner Low epo levels on procrit response 9-06-13 no need for procrit for some time hemoglobin 10 .4 check monthly see 6 monthly Iron deficiency ICD-10-CM: E61.1 ICD-9-CM: 280.9  11/19/2010 Overview Addendum 9/6/2013 10:11 AM by Mattie Turner Supportive Care Flowsheet 11/23/2010 11/23/2010 Day, Cycle Day 1, Cycle 1   
ferumoxytol (FERAHEME) IV    
iron dextran complex 50 mg/mL (INFED) IV [ 25 mg ] 1,000 mg Supportive Care Flowsheet 5/28/2012 6/4/2012 Day, Cycle Day 1, Cycle 1 Day 8, Cycle 1  
ferumoxytol (FERAHEME)  mg 510 mg  
iron dextran complex 50 mg/mL (INFED) IV    
5-2012 repeated 11-07-12 low iron stores regular follow up transferrin saturation  And replacement 9-06-13 iron stores still good  Results for Raina Fruit () as of 9/6/2013 10:09 
 9/6/2013 Iron 76 TIBC 262 Transferrin Saturation 29 Ferritin 1203 (H) Acute kidney injury ([facility] ICD-10-CM: N17.9 ICD-9-CM: 584.9  11/1/2010 Principal Problem: 
  SBO (small bowel obstruction) ([facility] [address] (12/2/2018) Active Problems: HTN (hypertension) (4/9/2014) Dyslipidemia (7/2/2015) Myelodysplastic syndrome ([facility] [address] () Small bowel obstruction ([facility] [address] (12/2/2018) Continue care per primary NPO/NGT for bowel rest 
Obtain CT to better evaluate [place] for conservative management Signed:  Emelia Phelps, NP

## 2018-12-04 NOTE — PROGRESS NOTES
Pt's NG tube fell out. Spoke with Dr. Bonnie Schrader who wanted another put back in. NG tube placed, pt tolerated well. KUB ordered to verify placement.

## 2018-12-05 ENCOUNTER — ANESTHESIA (OUTPATIENT)
Dept: SURGERY | Age: 78
DRG: 355 | End: 2018-12-05
Payer: MEDICARE

## 2018-12-05 ENCOUNTER — ANESTHESIA EVENT (OUTPATIENT)
Dept: SURGERY | Age: 78
DRG: 355 | End: 2018-12-05
Payer: MEDICARE

## 2018-12-05 ENCOUNTER — APPOINTMENT (OUTPATIENT)
Dept: CT IMAGING | Age: 78
DRG: 355 | End: 2018-12-05
Attending: NURSE PRACTITIONER
Payer: MEDICARE

## 2018-12-05 LAB
ANION GAP SERPL CALC-SCNC: 11 MMOL/L (ref 7–16)
BUN SERPL-MCNC: 23 MG/DL (ref 8–23)
CALCIUM SERPL-MCNC: 8.6 MG/DL (ref 8.3–10.4)
CHLORIDE SERPL-SCNC: 96 MMOL/L (ref 98–107)
CO2 SERPL-SCNC: 28 MMOL/L (ref 21–32)
CREAT SERPL-MCNC: 0.73 MG/DL (ref 0.8–1.5)
CREAT SERPL-MCNC: 0.74 MG/DL (ref 0.8–1.5)
GLUCOSE SERPL-MCNC: 83 MG/DL (ref 65–100)
POTASSIUM SERPL-SCNC: 3.9 MMOL/L (ref 3.5–5.1)
SODIUM SERPL-SCNC: 135 MMOL/L (ref 136–145)

## 2018-12-05 PROCEDURE — 0WQF0ZZ REPAIR ABDOMINAL WALL, OPEN APPROACH: ICD-10-PCS | Performed by: SURGERY

## 2018-12-05 PROCEDURE — 77030019908 HC STETH ESOPH SIMS -A: Performed by: ANESTHESIOLOGY

## 2018-12-05 PROCEDURE — 74011250636 HC RX REV CODE- 250/636: Performed by: EMERGENCY MEDICINE

## 2018-12-05 PROCEDURE — 77030039425 HC BLD LARYNG TRULITE DISP TELE -A: Performed by: ANESTHESIOLOGY

## 2018-12-05 PROCEDURE — 74011250636 HC RX REV CODE- 250/636

## 2018-12-05 PROCEDURE — 74177 CT ABD & PELVIS W/CONTRAST: CPT

## 2018-12-05 PROCEDURE — 74011250636 HC RX REV CODE- 250/636: Performed by: NURSE PRACTITIONER

## 2018-12-05 PROCEDURE — 76010000160 HC OR TIME 0.5 TO 1 HR INTENSV-TIER 1: Performed by: SURGERY

## 2018-12-05 PROCEDURE — 74011250636 HC RX REV CODE- 250/636: Performed by: INTERNAL MEDICINE

## 2018-12-05 PROCEDURE — 77030008467 HC STPLR SKN COVD -B: Performed by: SURGERY

## 2018-12-05 PROCEDURE — 77030018836 HC SOL IRR NACL ICUM -A: Performed by: SURGERY

## 2018-12-05 PROCEDURE — 74011250637 HC RX REV CODE- 250/637: Performed by: INTERNAL MEDICINE

## 2018-12-05 PROCEDURE — 80048 BASIC METABOLIC PNL TOTAL CA: CPT

## 2018-12-05 PROCEDURE — 74011636320 HC RX REV CODE- 636/320: Performed by: INTERNAL MEDICINE

## 2018-12-05 PROCEDURE — 77030002986 HC SUT PROL J&J -A: Performed by: SURGERY

## 2018-12-05 PROCEDURE — 76210000006 HC OR PH I REC 0.5 TO 1 HR: Performed by: SURGERY

## 2018-12-05 PROCEDURE — 77030037088 HC TUBE ENDOTRACH ORAL NSL COVD-A: Performed by: ANESTHESIOLOGY

## 2018-12-05 PROCEDURE — 65270000029 HC RM PRIVATE

## 2018-12-05 PROCEDURE — 77030002966 HC SUT PDS J&J -A: Performed by: SURGERY

## 2018-12-05 PROCEDURE — 74011000258 HC RX REV CODE- 258: Performed by: NURSE PRACTITIONER

## 2018-12-05 PROCEDURE — 76060000032 HC ANESTHESIA 0.5 TO 1 HR: Performed by: SURGERY

## 2018-12-05 PROCEDURE — 77030032490 HC SLV COMPR SCD KNE COVD -B: Performed by: SURGERY

## 2018-12-05 PROCEDURE — 74011000258 HC RX REV CODE- 258: Performed by: INTERNAL MEDICINE

## 2018-12-05 PROCEDURE — 74011000250 HC RX REV CODE- 250

## 2018-12-05 PROCEDURE — 77030034850: Performed by: SURGERY

## 2018-12-05 PROCEDURE — 36415 COLL VENOUS BLD VENIPUNCTURE: CPT

## 2018-12-05 PROCEDURE — 74011000250 HC RX REV CODE- 250: Performed by: SURGERY

## 2018-12-05 PROCEDURE — 77030019940 HC BLNKT HYPOTHRM STRY -B: Performed by: ANESTHESIOLOGY

## 2018-12-05 PROCEDURE — 77030020255 HC SOL INJ LR 1000ML BG

## 2018-12-05 PROCEDURE — 74011250636 HC RX REV CODE- 250/636: Performed by: ANESTHESIOLOGY

## 2018-12-05 RX ORDER — SODIUM CHLORIDE, SODIUM LACTATE, POTASSIUM CHLORIDE, CALCIUM CHLORIDE 600; 310; 30; 20 MG/100ML; MG/100ML; MG/100ML; MG/100ML
75 INJECTION, SOLUTION INTRAVENOUS CONTINUOUS
Status: DISCONTINUED | OUTPATIENT
Start: 2018-12-05 | End: 2018-12-05 | Stop reason: HOSPADM

## 2018-12-05 RX ORDER — LIDOCAINE HYDROCHLORIDE 10 MG/ML
0.1 INJECTION INFILTRATION; PERINEURAL AS NEEDED
Status: DISCONTINUED | OUTPATIENT
Start: 2018-12-05 | End: 2018-12-05 | Stop reason: HOSPADM

## 2018-12-05 RX ORDER — CEFOXITIN 2 G/1
2 INJECTION, POWDER, FOR SOLUTION INTRAVENOUS EVERY 6 HOURS
Status: DISCONTINUED | OUTPATIENT
Start: 2018-12-06 | End: 2018-12-06 | Stop reason: SDUPTHER

## 2018-12-05 RX ORDER — FENTANYL CITRATE 50 UG/ML
INJECTION, SOLUTION INTRAMUSCULAR; INTRAVENOUS AS NEEDED
Status: DISCONTINUED | OUTPATIENT
Start: 2018-12-05 | End: 2018-12-05 | Stop reason: HOSPADM

## 2018-12-05 RX ORDER — ONDANSETRON 2 MG/ML
4 INJECTION INTRAMUSCULAR; INTRAVENOUS
Status: DISCONTINUED | OUTPATIENT
Start: 2018-12-05 | End: 2018-12-07 | Stop reason: HOSPADM

## 2018-12-05 RX ORDER — SODIUM CHLORIDE 0.9 % (FLUSH) 0.9 %
5-10 SYRINGE (ML) INJECTION AS NEEDED
Status: DISCONTINUED | OUTPATIENT
Start: 2018-12-05 | End: 2018-12-05 | Stop reason: HOSPADM

## 2018-12-05 RX ORDER — ROCURONIUM BROMIDE 10 MG/ML
INJECTION, SOLUTION INTRAVENOUS AS NEEDED
Status: DISCONTINUED | OUTPATIENT
Start: 2018-12-05 | End: 2018-12-05 | Stop reason: HOSPADM

## 2018-12-05 RX ORDER — BUPIVACAINE HYDROCHLORIDE 5 MG/ML
INJECTION, SOLUTION EPIDURAL; INTRACAUDAL AS NEEDED
Status: DISCONTINUED | OUTPATIENT
Start: 2018-12-05 | End: 2018-12-05 | Stop reason: HOSPADM

## 2018-12-05 RX ORDER — LIDOCAINE HYDROCHLORIDE 20 MG/ML
INJECTION, SOLUTION EPIDURAL; INFILTRATION; INTRACAUDAL; PERINEURAL AS NEEDED
Status: DISCONTINUED | OUTPATIENT
Start: 2018-12-05 | End: 2018-12-05 | Stop reason: HOSPADM

## 2018-12-05 RX ORDER — ONDANSETRON 2 MG/ML
INJECTION INTRAMUSCULAR; INTRAVENOUS AS NEEDED
Status: DISCONTINUED | OUTPATIENT
Start: 2018-12-05 | End: 2018-12-05 | Stop reason: HOSPADM

## 2018-12-05 RX ORDER — SODIUM CHLORIDE 0.9 % (FLUSH) 0.9 %
5-10 SYRINGE (ML) INJECTION EVERY 8 HOURS
Status: DISCONTINUED | OUTPATIENT
Start: 2018-12-05 | End: 2018-12-05 | Stop reason: HOSPADM

## 2018-12-05 RX ORDER — PROPOFOL 10 MG/ML
INJECTION, EMULSION INTRAVENOUS AS NEEDED
Status: DISCONTINUED | OUTPATIENT
Start: 2018-12-05 | End: 2018-12-05 | Stop reason: HOSPADM

## 2018-12-05 RX ORDER — SUCCINYLCHOLINE CHLORIDE 20 MG/ML
INJECTION INTRAMUSCULAR; INTRAVENOUS AS NEEDED
Status: DISCONTINUED | OUTPATIENT
Start: 2018-12-05 | End: 2018-12-05 | Stop reason: HOSPADM

## 2018-12-05 RX ORDER — SODIUM CHLORIDE, SODIUM LACTATE, POTASSIUM CHLORIDE, CALCIUM CHLORIDE 600; 310; 30; 20 MG/100ML; MG/100ML; MG/100ML; MG/100ML
75 INJECTION, SOLUTION INTRAVENOUS CONTINUOUS
Status: CANCELLED | OUTPATIENT
Start: 2018-12-05

## 2018-12-05 RX ORDER — HYDROMORPHONE HYDROCHLORIDE 1 MG/ML
1 INJECTION, SOLUTION INTRAMUSCULAR; INTRAVENOUS; SUBCUTANEOUS
Status: DISCONTINUED | OUTPATIENT
Start: 2018-12-05 | End: 2018-12-07 | Stop reason: HOSPADM

## 2018-12-05 RX ORDER — MIDAZOLAM HYDROCHLORIDE 1 MG/ML
2 INJECTION, SOLUTION INTRAMUSCULAR; INTRAVENOUS
Status: DISCONTINUED | OUTPATIENT
Start: 2018-12-05 | End: 2018-12-05 | Stop reason: HOSPADM

## 2018-12-05 RX ORDER — HYDROMORPHONE HYDROCHLORIDE 2 MG/ML
0.5 INJECTION, SOLUTION INTRAMUSCULAR; INTRAVENOUS; SUBCUTANEOUS
Status: CANCELLED | OUTPATIENT
Start: 2018-12-05

## 2018-12-05 RX ORDER — FENTANYL CITRATE 50 UG/ML
100 INJECTION, SOLUTION INTRAMUSCULAR; INTRAVENOUS ONCE
Status: DISCONTINUED | OUTPATIENT
Start: 2018-12-05 | End: 2018-12-05 | Stop reason: HOSPADM

## 2018-12-05 RX ORDER — EPHEDRINE SULFATE 50 MG/ML
INJECTION, SOLUTION INTRAVENOUS AS NEEDED
Status: DISCONTINUED | OUTPATIENT
Start: 2018-12-05 | End: 2018-12-05 | Stop reason: HOSPADM

## 2018-12-05 RX ORDER — OXYCODONE AND ACETAMINOPHEN 7.5; 325 MG/1; MG/1
1 TABLET ORAL
Status: DISCONTINUED | OUTPATIENT
Start: 2018-12-05 | End: 2018-12-06

## 2018-12-05 RX ORDER — MIDAZOLAM HYDROCHLORIDE 1 MG/ML
5 INJECTION, SOLUTION INTRAMUSCULAR; INTRAVENOUS ONCE
Status: DISCONTINUED | OUTPATIENT
Start: 2018-12-05 | End: 2018-12-05 | Stop reason: HOSPADM

## 2018-12-05 RX ORDER — DEXAMETHASONE SODIUM PHOSPHATE 4 MG/ML
INJECTION, SOLUTION INTRA-ARTICULAR; INTRALESIONAL; INTRAMUSCULAR; INTRAVENOUS; SOFT TISSUE AS NEEDED
Status: DISCONTINUED | OUTPATIENT
Start: 2018-12-05 | End: 2018-12-05 | Stop reason: HOSPADM

## 2018-12-05 RX ORDER — SODIUM CHLORIDE 0.9 % (FLUSH) 0.9 %
10 SYRINGE (ML) INJECTION
Status: COMPLETED | OUTPATIENT
Start: 2018-12-05 | End: 2018-12-05

## 2018-12-05 RX ORDER — POTASSIUM CHLORIDE AND SODIUM CHLORIDE 450; 150 MG/100ML; MG/100ML
INJECTION, SOLUTION INTRAVENOUS CONTINUOUS
Status: DISCONTINUED | OUTPATIENT
Start: 2018-12-05 | End: 2018-12-07 | Stop reason: HOSPADM

## 2018-12-05 RX ADMIN — SODIUM CHLORIDE, SODIUM LACTATE, POTASSIUM CHLORIDE, AND CALCIUM CHLORIDE 150 ML/HR: 600; 310; 30; 20 INJECTION, SOLUTION INTRAVENOUS at 00:50

## 2018-12-05 RX ADMIN — Medication 10 ML: at 21:16

## 2018-12-05 RX ADMIN — POTASSIUM CHLORIDE 20 MEQ: 200 INJECTION, SOLUTION INTRAVENOUS at 04:32

## 2018-12-05 RX ADMIN — VITAMIN D, TAB 1000IU (100/BT) 1000 UNITS: 25 TAB at 12:47

## 2018-12-05 RX ADMIN — EPHEDRINE SULFATE 10 MG: 50 INJECTION, SOLUTION INTRAVENOUS at 18:02

## 2018-12-05 RX ADMIN — ROCURONIUM BROMIDE 5 MG: 10 INJECTION, SOLUTION INTRAVENOUS at 17:55

## 2018-12-05 RX ADMIN — POTASSIUM CHLORIDE 20 MEQ: 200 INJECTION, SOLUTION INTRAVENOUS at 12:46

## 2018-12-05 RX ADMIN — LIDOCAINE HYDROCHLORIDE 100 MG: 20 INJECTION, SOLUTION EPIDURAL; INFILTRATION; INTRACAUDAL; PERINEURAL at 17:55

## 2018-12-05 RX ADMIN — SODIUM CHLORIDE, SODIUM LACTATE, POTASSIUM CHLORIDE, AND CALCIUM CHLORIDE: 600; 310; 30; 20 INJECTION, SOLUTION INTRAVENOUS at 17:44

## 2018-12-05 RX ADMIN — PRAMIPEXOLE DIHYDROCHLORIDE 0.5 MG: 0.25 TABLET ORAL at 06:00

## 2018-12-05 RX ADMIN — LISINOPRIL AND HYDROCHLOROTHIAZIDE 1 TABLET: 25; 20 TABLET ORAL at 12:46

## 2018-12-05 RX ADMIN — PRAMIPEXOLE DIHYDROCHLORIDE 0.5 MG: 0.25 TABLET ORAL at 12:46

## 2018-12-05 RX ADMIN — SODIUM CHLORIDE 100 ML: 900 INJECTION, SOLUTION INTRAVENOUS at 11:16

## 2018-12-05 RX ADMIN — GABAPENTIN 400 MG: 400 CAPSULE ORAL at 12:46

## 2018-12-05 RX ADMIN — CEFOXITIN 2 G: 2 INJECTION, POWDER, FOR SOLUTION INTRAVENOUS at 17:54

## 2018-12-05 RX ADMIN — FOLIC ACID 1 MG: 1 TABLET ORAL at 12:47

## 2018-12-05 RX ADMIN — EPHEDRINE SULFATE 5 MG: 50 INJECTION, SOLUTION INTRAVENOUS at 17:59

## 2018-12-05 RX ADMIN — IOPAMIDOL 100 ML: 755 INJECTION, SOLUTION INTRAVENOUS at 11:16

## 2018-12-05 RX ADMIN — ASPIRIN 81 MG: 81 TABLET, COATED ORAL at 12:47

## 2018-12-05 RX ADMIN — GABAPENTIN 400 MG: 400 CAPSULE ORAL at 15:59

## 2018-12-05 RX ADMIN — ROCURONIUM BROMIDE 35 MG: 10 INJECTION, SOLUTION INTRAVENOUS at 18:08

## 2018-12-05 RX ADMIN — FENTANYL CITRATE 50 MCG: 50 INJECTION, SOLUTION INTRAMUSCULAR; INTRAVENOUS at 17:55

## 2018-12-05 RX ADMIN — SIMVASTATIN 20 MG: 20 TABLET, FILM COATED ORAL at 21:16

## 2018-12-05 RX ADMIN — LATANOPROST 1 DROP: 50 SOLUTION OPHTHALMIC at 21:17

## 2018-12-05 RX ADMIN — FENTANYL CITRATE 50 MCG: 50 INJECTION, SOLUTION INTRAMUSCULAR; INTRAVENOUS at 18:10

## 2018-12-05 RX ADMIN — DEXAMETHASONE SODIUM PHOSPHATE 4 MG: 4 INJECTION, SOLUTION INTRA-ARTICULAR; INTRALESIONAL; INTRAMUSCULAR; INTRAVENOUS; SOFT TISSUE at 18:20

## 2018-12-05 RX ADMIN — PRAMIPEXOLE DIHYDROCHLORIDE 0.5 MG: 0.25 TABLET ORAL at 21:16

## 2018-12-05 RX ADMIN — Medication 10 ML: at 16:01

## 2018-12-05 RX ADMIN — ONDANSETRON 4 MG: 2 INJECTION INTRAMUSCULAR; INTRAVENOUS at 18:28

## 2018-12-05 RX ADMIN — GABAPENTIN 400 MG: 400 CAPSULE ORAL at 21:16

## 2018-12-05 RX ADMIN — SUCCINYLCHOLINE CHLORIDE 200 MG: 20 INJECTION INTRAMUSCULAR; INTRAVENOUS at 17:55

## 2018-12-05 RX ADMIN — Medication 10 ML: at 11:16

## 2018-12-05 RX ADMIN — SODIUM CHLORIDE, SODIUM LACTATE, POTASSIUM CHLORIDE, AND CALCIUM CHLORIDE 150 ML/HR: 600; 310; 30; 20 INJECTION, SOLUTION INTRAVENOUS at 16:48

## 2018-12-05 RX ADMIN — PRAMIPEXOLE DIHYDROCHLORIDE 0.5 MG: 0.25 TABLET ORAL at 16:00

## 2018-12-05 RX ADMIN — SODIUM CHLORIDE, SODIUM LACTATE, POTASSIUM CHLORIDE, AND CALCIUM CHLORIDE 150 ML/HR: 600; 310; 30; 20 INJECTION, SOLUTION INTRAVENOUS at 07:50

## 2018-12-05 RX ADMIN — PROPOFOL 140 MG: 10 INJECTION, EMULSION INTRAVENOUS at 17:55

## 2018-12-05 RX ADMIN — DIATRIZOATE MEGLUMINE AND DIATRIZOATE SODIUM 15 ML: 660; 100 LIQUID ORAL; RECTAL at 09:16

## 2018-12-05 NOTE — ANESTHESIA PREPROCEDURE EVALUATION
Anesthetic History Review of Systems / Medical History Patient summary reviewed and pertinent labs reviewed Pulmonary Neuro/Psych Cardiovascular Hypertension: well controlled Exercise tolerance: >4 METS 
  
GI/Hepatic/Renal 
  
 
 
 
 
 
Comments: SBO Endo/Other Arthritis and anemia Other Findings Comments: Myelodysplastic syndrome Physical Exam 
 
Airway Mallampati: II 
TM Distance: 4 - 6 cm Neck ROM: normal range of motion Mouth opening: Normal 
 
 Cardiovascular Regular rate and rhythm,  S1 and S2 normal,  no murmur, click, rub, or gallop Dental 
 
Dentition: Full upper dentures and Full lower dentures Pulmonary Breath sounds clear to auscultation Abdominal 
 
 
 
 Other Findings Anesthetic Plan ASA: 3, emergent Anesthesia type: general 
 
 
 
 
Induction: Intravenous Anesthetic plan and risks discussed with: Patient and Spouse

## 2018-12-05 NOTE — PROGRESS NOTES
TRANSFER - OUT REPORT: 
 
Verbal report given to Suzanna RN(name) on Chery Chavez  being transferred to Pre Op(unit) for ordered procedure Report consisted of patients Situation, Background, Assessment and  
Recommendations(SBAR). Information from the following report(s) SBAR, Kardex, Intake/Output and MAR was reviewed with the receiving nurse. Lines:  
Peripheral IV 12/02/18 Right Antecubital (Active) Site Assessment Clean, dry, & intact 12/5/2018  7:45 AM  
Phlebitis Assessment 0 12/5/2018  7:45 AM  
Infiltration Assessment 0 12/5/2018  7:45 AM  
Dressing Status Clean, dry, & intact 12/5/2018  7:45 AM  
Dressing Type Tape;Transparent 12/5/2018  7:45 AM  
Hub Color/Line Status Infusing 12/5/2018  7:45 AM  
   
Peripheral IV 12/04/18 Anterior;Distal;Inferior; Lower;Right Arm (Active) Site Assessment Clean, dry, & intact 12/5/2018  7:45 AM  
Phlebitis Assessment 0 12/5/2018  7:45 AM  
Infiltration Assessment 0 12/5/2018  7:45 AM  
Dressing Status Clean, dry, & intact 12/5/2018  7:45 AM  
Dressing Type Tape;Transparent 12/5/2018  7:45 AM  
Hub Color/Line Status Capped 12/5/2018  7:45 AM  
  
 
Opportunity for questions and clarification was provided. Patient transported with: 
 [facility]

## 2018-12-05 NOTE — BRIEF OP NOTE
BRIEF OPERATIVE NOTE Date of Procedure: 12/5/2018 Preoperative Diagnosis: Small bowel obstruction ([address] [I39.659] Postoperative Diagnosis: incarcerated umbilical hernia Procedure(s): 
REPAIR OF INCARCERATED HERNIA, Surgeon(s) and Role: * Jerardo Rossi MD - Primary Surgical Assistant: none Surgical Staff: 
Circ-1: Priscila Shrestha RN Scrub Tech-1: Vielka Edouard Scrub Tech-2: David Gotti Time In Time Out Incision Start 12/05/2018 1807 Incision Close 12/05/2018 1825 Anesthesia: General  
Estimated Blood Loss: 20ml Specimens: * No specimens in log * Findings: incarcerated small bowel loop- viable Complications: none Implants: * No implants in log *

## 2018-12-05 NOTE — PROGRESS NOTES
Ct reviewed. SBO found at the umbilical hernia site. Will plan for OR this afternoon. NPO Obtain consent ABX on call to OR Discussed the patient's condition and treatment options with the patient. Discussed risks of surgery in language the patient could understand. The patient voiced understanding of all this and all questions were answered. Alternatives to surgery were discussed also and risks of the alternatives. The patient requested that we proceed with surgery. Informed consent was obtained. CT Results (most recent): 
Results from [facility] [place] Encounter encounter on 12/02/18 CT ABD PELV W CONT Narrative CT OF THE ABDOMEN AND PELVIS WITH CONTRAST. CLINICAL INDICATION: Small bowel obstruction PROCEDURE: Serial thin section axial images obtained from the lung bases through 
the proximal femurs following the administration of 100 cc of Isovue 370 
intravenous contrast.  Radiation dose reduction techniques were used for this 
study. Our CT scanners use one or all of the following: Automated exposure 
control, adjusted of the mA and/or kV according to patient size, iterative 
reconstruction COMPARISON: CT the abdomen and pelvis dated 5/2/2018 FINDINGS:  
 
CT ABDOMEN: An umbilical hernia is appreciated that contains a loop of small 
bowel. Just proximal to the hernia are multiple dilated fluid-filled loops of 
small bowel that measure up to 4.3 cm in maximum dimension. Acute mechanical 
small bowel obstruction at the site of the umbilical hernia is suspected. The 
liver and spleen are normal. The pancreas is unremarkable. The kidneys are 
symmetric in size and contrast enhancement. There is no hydronephrosis. Diverticular changes are noted along the colon. Note the colon is decompressed. CT PELVIS: The bladder is unremarkable. The rectum is decompressed. No free 
fluid acutely dependently in the pelvis. Atherosclerotic calcifications noted in 
the aorta. No aggressive osseous lesions noted. Posterior lumbar spine fusion hardware is 
present. Impression IMPRESSION: 
1. Acute mechanical small bowel obstruction evident with the transition point 
noted at the site of the small umbilical hernia. These important findings were called to the patient's floor nurse Michael Ospina by Dr. Zeke Gama at 2:30 PM on 12/5/2018.  
 
 
   
Adriana Vázquez NP

## 2018-12-05 NOTE — PROGRESS NOTES
Hospitalist Progress Note   
2018 Admit Date: 2018 12:58 PM  
NAME: Carmine Perkins :  1940 MRN:  [id number] Attending: Mitul Hayes DO 
PCP:  Netta Rosas MD 
 
SUBJECTIVE: As Per Prior Notes: 
\" 
65 yo CM with past history of HTN, multiple chronic joint problems s/p multiple surgeries, s/p cholecystectomy, MDD (requires phlebotomy for iron overload), and glaucoma presents with a 2-day history of intractable nausea/vomiting and inability to keep food down since eating soup on Friday. Patient has had a SBO in 2018 that was treated conservatively with NG tube. Patient did try to drink a Coke this morning and had vomiting afterwards. He had a small bowel movement yesterday. No fevers/chills, chest pain, headache, BRBPR, melena. 
  
ED Course: KUB showing dilated bowel with air-fluid levels suggestive of SBO. CBC showing mildly elevated WBC count of 14 and (known) chronic anemia. Patient receiving aerosolized lidocaine prior to insertion of NG tube 
  
Interval History (12/3): patient examined at bedside. No acute events overnight. NG tube in place to suction with ~1 liter of brown output. No fevers/chills, abdominal pain, shortness of breath, chest pain. Has not passed gas or had a bowel movement yet. Patient anxious about what to do next. \" 
 
 
2018- pt seen - he and his wife are concerned about no resolution of the sbo yet- previously had it and resolved in 2 days. 2018- patient seen- still with NGT to suction. No BM or gas passed. No abdominal pain. Ct abd/pelvis pending Review of Systems negative with exception of pertinent positives noted above PHYSICAL EXAM  
 
Visit Vitals /57 (BP 1 Location: Left arm, BP Patient Position: At rest) Pulse 80 Temp 98.6 °F (37 °C) Resp 17 Ht 4' 11\" (1.499 m) Wt 74.8 kg (165 lb) SpO2 92% BMI 33.33 kg/m² Temp (24hrs), Av.4 °F (36.9 °C), Min:98.2 °F (36.8 °C), Max:98.6 °F (37 °C) Oxygen Therapy O2 Sat (%): 92 % (12/05/18 0310) Pulse via Oximetry: 84 beats per minute (12/02/18 1436) O2 Device: Room air (12/04/18 1500) Intake/Output Summary (Last 24 hours) at 12/5/2018 4907 Last data filed at 12/4/2018 1826 Gross per 24 hour Intake 1067 ml Output 350 ml Net 717 ml General:          Alert, cooperative, no distress, appears stated age. Head:               Normocephalic, without obvious abnormality, atraumatic. Nose:               Nares normal. No drainage or sinus tenderness. Lungs:             Clear to auscultation bilaterally. No Wheezing or Rhonchi. No rales. Heart:              Regular rate and rhythm,  no murmur, rub or gallop. Abdomen:        seems more distended today, hyperactive bowel sounds, no TTP, no rebound tenderness Extremities:     No cyanosis. No edema. No clubbing Skin:                Texture, turgor normal. No rashes or lesions. Not Jaundiced Neurologic:      Alert and oriented x 3, no focal deficits ASSESSMENT Active Hospital Problems Diagnosis Date Noted  Small bowel obstruction ([address] 12/02/2018  
 SBO (small bowel obstruction) ([address] 12/02/2018  Myelodysplastic syndrome ([address]  Dyslipidemia 07/02/2015  
 HTN (hypertension) 04/09/2014 Plan: 
· Continue NPO for now · Continue NGT to suction · Abdominal x-rays on hold as pt scheduled fro ct scan  
· monitor electrolytes and correct as needed- am labs pending · Encourage movement · Check lactic acid · Get surgery consult appreciated · Patient with recent (unremarkable) EGD/colonscopy as outpatient 
  
 
Ppx: Lovenox for VTE 
  
Code Status: FULL CODE 
  
Disposition: pending workup as above, not anticipating any post-discharge needs following hospitalization Signed By: Anitra Olivarez MD   
 December 5, 2018

## 2018-12-05 NOTE — PROGRESS NOTES
H&P/Consult Note/Progress Note/Office Note:  
Andre Perkins  MRN: [id number]  :1940  Age:78 y.o. 
 
HPI: Andre Perkins is a 66 y.o. male who we are asked by Dr. Yomaira Randolph to see for a small bowel obstruction. The patient was admitted on 18 after reporting to the ER with a 2 day history of nausea and vomiting. A KUB was obtained that is concerning for a SBO. No CT has been obtained. He has a PMHx of HTN, multiple chronic joint problems, s/p cholecystectomy, MDD (requires phlebotomy for iron overload), and glaucoma. He states he had a similar episode in 2018 that reserved with conservative measures. He said after his last SBO he had extensive work-up to include a colonoscopy, and EGD reportedly without any acute findings. A CTAP was obtained  with full thickening of the distal small bowel loops with injection of the mesentery noted (full results listed below). At present his abdomen is soft and non-tender. He has a NGT inserted with brown output noted in canister. WBC is 12.5, Na 133, K+3.3. He denies fever or chills. He denies constipation or diarrhea. 18 No complaints this AM, -flatus/-BM. CT pending. NG with 500 mL/24h. AF, VSS. Past Medical History:  
Diagnosis Date  Anemia  Glaucoma  History of small bowel obstruction 2018  
 releived with bowel rest and NGT  
 HTN (hypertension)  Hypercholesterolemia  Iron overload   
 regular phlebotomy  Macular degeneration  Myelodysplastic syndrome ([facility] [address]  Osteoarthritis of multiple joints  Peripheral neuropathy 2010  RLS (restless legs syndrome)  Sleep apnea  Spondylolisthesis, acquired 2010 Past Surgical History:  
Procedure Laterality Date  HX CARPAL TUNNEL RELEASE Right 2014  HX CATARACT REMOVAL Bilateral 2017  HX CERVICAL DISKECTOMY  2002  HX CERVICAL FUSION   &   
 cervical plate placement  HX CHOLECYSTECTOMY  14  HX COLONOSCOPY  2001/2008  HX LUMBAR LAMINECTOMY  9/2/10  
 lumbar laminectomy L4 x 2/ rods  HX ORTHOPAEDIC Right 1985  
 ankle fusion with hardware  HX ORTHOPAEDIC Left 2/14/2011  
 fusion of bones in foot  HX ORTHOPAEDIC  1/2013  
 rods in back  HX THORACIC LAMINECTOMY  4/2/09 T12, L2,L3  
 NEUROLOGICAL PROCEDURE UNLISTED  2013 T10-T12 fusion  TOTAL HIP ARTHROPLASTY Bilateral   
 R in 2003, l in 2014  TOTAL KNEE ARTHROPLASTY Left 12/8/2003  TOTAL KNEE ARTHROPLASTY Right 12/7/2009 Current Facility-Administered Medications Medication Dose Route Frequency  sodium chloride 0.9 % bolus infusion 100 mL  100 mL IntraVENous RAD ONCE  
 saline peripheral flush soln 10 mL  10 mL InterCATHeter RAD ONCE  
 iopamidol (ISOVUE-370) 76 % injection 100 mL  100 mL IntraVENous RAD ONCE  
 lip protectant (BLISTEX) ointment   Topical PRN  potassium chloride 20 mEq in 100 ml IVPB  20 mEq IntraVENous Q6H  
 lactated Ringers infusion  150 mL/hr IntraVENous CONTINUOUS  
 sodium chloride (NS) flush 5-10 mL  5-10 mL IntraVENous Q8H  
 sodium chloride (NS) flush 5-10 mL  5-10 mL IntraVENous PRN  
 acetaminophen (TYLENOL) tablet 650 mg  650 mg Oral Q4H PRN  
 ondansetron (ZOFRAN) injection 4 mg  4 mg IntraVENous Q4H PRN  
 bisacodyl (DULCOLAX) tablet 5 mg  5 mg Oral DAILY PRN  
 enoxaparin (LOVENOX) injection 40 mg  40 mg SubCUTAneous Q24H  
 aspirin delayed-release tablet 81 mg  81 mg Oral DAILY  cholecalciferol (VITAMIN D3) tablet 1,000 Units  1,000 Units Oral DAILY  folic acid (FOLVITE) tablet 1 mg  1 mg Oral DAILY  gabapentin (NEURONTIN) capsule 400 mg  400 mg Oral TID  latanoprost (XALATAN) 0.005 % ophthalmic solution 1 Drop  1 Drop Both Eyes QHS  lisinopril-hydroCHLOROthiazide (PRINZIDE, ZESTORETIC) 20-25 mg per tablet 1 Tab  1 Tab Oral DAILY  pramipexole (MIRAPEX) tablet 0.5 mg  0.5 mg Oral 5XD  simvastatin (ZOCOR) tablet 20 mg  20 mg Oral DAILY Flagyl [metronidazole]; Azithromycin; Gadolinium-containing contrast media; Macrolide antibiotics; Penicillins; Quinolones; Roxicodone [oxycodone]; and Ultram [tramadol] Social History Socioeconomic History  Marital status:  Spouse name: Not on file  Number of children: 3  
 Years of education: Not on file  Highest education level: Not on file Tobacco Use  Smoking status: Former Smoker Packs/day: 1.00 Years: 8.00 Pack years: 8.00 Last attempt to quit: 1967 Years since quittin.9  Smokeless tobacco: Never Used Substance and Sexual Activity  Alcohol use: Yes Alcohol/week: 7.0 oz Types: 14 Cans of beer per week Comment: week  Drug use: No  
 Sexual activity: Not Currently Other Topics Concern  Special Diet No  
 Exercise No  
 
Social History Tobacco Use Smoking Status Former Smoker  Packs/day: 1.00  Years: 8.00  
 Pack years: 8.00  Last attempt to quit: 1967  Years since quittin.9 Smokeless Tobacco Never Used Family History Problem Relation Age of Onset  Heart Disease Mother   
     carotid enarterectomy  Cancer Mother   
     hx of lymph node  Arthritis-osteo Brother  Lung Disease Brother COPD  Cancer Father   
     prostate ROS: The patient has no difficulty with chest pain or shortness of breath. No fever or chills. Comprehensive review of systems was otherwise unremarkable except as noted above. Physical Exam:  
Visit Vitals /67 Pulse 77 Temp 98.9 °F (37.2 °C) Resp 16 Ht 4' 11\" (1.499 m) Wt 165 lb (74.8 kg) SpO2 96% BMI 33.33 kg/m² Constitutional: Alert, oriented, cooperative patient in no acute distress; appears stated age Eyes:Sclera are clear. EOMs intact ENMT: no external lesions gross hearing normal; no obvious neck masses, no ear or lip lesions, nares normal +NGT 
CV: RRR. Normal perfusion Resp: No JVD. Breathing is  non-labored; no audible wheezing. GI: soft and non-distended, non tender, +umbilical hernia Musculoskeletal: unremarkable with normal function. No embolic signs or cyanosis. Neuro:  Oriented; moves all 4; no focal deficits Psychiatric: normal affect and mood, no memory impairment Recent vitals (if inpt): 
Patient Vitals for the past 24 hrs: 
 BP Temp Pulse Resp SpO2  
12/05/18 0806 119/67 98.9 °F (37.2 °C) 77 16 96 % 12/05/18 0310 121/57 98.6 °F (37 °C) 80 17 92 % 12/04/18 2325 131/74 98.3 °F (36.8 °C) 81 16 96 % 12/04/18 1928 134/65 98.6 °F (37 °C) 79 16 96 % 12/04/18 1518 109/59 98.2 °F (36.8 °C) 83 17 95 % 12/04/18 1137 134/74 98.4 °F (36.9 °C) 84 16 96 % Labs: 
Recent Labs 12/05/18 
9217 12/03/18 
0428 12/02/18 
1229 WBC  --  12.5* 14.2* HGB  --  9.4* 10.6* PLT  --  204 264 * 133* 132* K 3.9 3.3* 4.1 CL 96* 97* 95* CO2 28 29 26 BUN 23 32* 34* CREA 0.74*  0.73* 0.96 1.30 GLU 83 117* 137* TBILI  --   --  0.7 SGOT  --   --  22 ALT  --   --  33  
AP  --   --  75 Lab Results Component Value Date/Time WBC 12.5 (H) 12/03/2018 04:28 AM  
 HGB 9.4 (L) 12/03/2018 04:28 AM  
 PLATELET 487 40/13/6411 04:28 AM  
 Sodium 135 (L) 12/05/2018 07:35 AM  
 Potassium 3.9 12/05/2018 07:35 AM  
 Chloride 96 (L) 12/05/2018 07:35 AM  
 CO2 28 12/05/2018 07:35 AM  
 BUN 23 12/05/2018 07:35 AM  
 Creatinine 0.73 (L) 12/05/2018 07:35 AM  
 Creatinine 0.74 (L) 12/05/2018 07:35 AM  
 Glucose 83 12/05/2018 07:35 AM  
 INR 1.0 07/02/2014 01:45 PM  
 aPTT 31.7 07/02/2014 01:45 PM  
 Bilirubin, total 0.7 12/02/2018 12:29 PM  
 AST (SGOT) 22 12/02/2018 12:29 PM  
 ALT (SGPT) 33 12/02/2018 12:29 PM  
 Alk. phosphatase 75 12/02/2018 12:29 PM  
 Troponin-I <0.05 11/01/2010 11:57 AM  
 Troponin-I, Qt. 0.11 (H) 07/13/2014 04:25 PM  
 
 
I reviewed recent labs and recent radiologic studies. CTAP 09/2018 FINDINGS: 
Abdomen CT: 
 The extreme lung bases demonstrate bibasilar lower lobe airspace opacities and bronchiectasis. Some right middle lobe inflammatory change may be present as well. This could represent inflammatory versus airspace disease. Noncontrast views of the liver spleen and adrenals are within normal limits. Gallbladder is surgically absent. Kidneys show no hydronephrosis or intrarenal calculus. Aorta is normal in caliber. Pancreas is unremarkable. Pelvic CT:   
Small fat-containing umbilical hernia is present without complication. Dilation of the stomach and proximal half of the small bowel is noted. Transition point appears to be in the anterior upper pelvis just to the right of midline. No involvement of the adjacent fat containing umbilical hernia is noted. Diverticulosis of the sigmoid and descending colon is noted without evidence of acute diverticulitis. Bilateral hip arthroplasties are within normal limits. SI joints show degenerative change similar to prior study. Posterior fusion hardware in the lumbar spine is noted. No free fluid or gas is noted. IMPRESSION: 
Mid to distal small bowel obstruction transition point in the anterior upper pelvis just to the right of midline. No relation to the uncomplicated fat-containing umbilical hernia. Inflammatory versus airspace opacities both lower lobes and the right middle lobe with an element of bronchiectasis. Reflux from the stomach into the esophagus is noted with a standing column of fluid/oral contrast in the esophagus. Patient may be at risk of aspiration. CTAP 11/6 FINDINGS: Beam hardening artifact from hip arthroplasties distorts images at 
the level of the pelvis obscuring anatomic structures. Extensive beam 
hardening artifact from the thoracic lumbar spine fusion distorts images throughout the abdominal cavity.  
Parenchymal and reticular nodular opacities and ground-glass opacities in the 
 lower lungs and right middle lobe with relative sparing of the lingula. Scattered linear opacities in the lower lungs and right middle lobe. No free air under the diaphragm. No evidence of ascites. Scattered air-fluid levels in the colon. No colonic distention. Appendix appears normal. 
Full thickening of the distal small bowel loops with injection of the 
mesentery. Bladder obscured. Prostate is enlarged but obscured. Severe calcified plaque in the aorta and pelvic arteries. Induration of the mellitus. Unenhanced liver, spleen, pancreas unremarkable. No obstructive uropathy. Cholecystectomy. Adrenal glands unremarkable IMPRESSION: Beam hardening artifact from extensive thoracic lumbar spine 
hardware and hip arthroplasties distorts images of the abdominal pelvic 
cavity. Evidence of distal small bowel enteritis with colonic ileus or diarrheal 
disease. Evidence of mesenteric edema in the right lower quadrant. Appendix 
appears normal. 
Acute bronchiolitis, pneumonitis and atelectasis of the right and left lower 
lungs and right middle lobe. No obstructive uropathy. Prostate enlargement. Periumbilical eventration. Cholecystectomy. Additional findings as noted above. XR Results (most recent): 
Results from Hospital Encounter encounter on 12/02/18 XR ABD (KUB) Narrative KUB supine single portable view History:  NGT placement, 66 years Male Comparison:  KUB 2 days ago Findings: Esophageal tube appears to terminate in the region of the gastric 
body. Again visualized are multiple mildly dilated loops of small bowel 
throughout the abdomen, consistent with persistent mechanical small bowel 
obstruction. No free air is evident. No suspicious renal or ureteral calculi 
are evident. Extensive thoracolumbar posterior fusion hardware is again noted. Visualized osseous structures otherwise unremarkable.  
  
 Impression Impression: New esophageal tube appears to terminate in the region of the 
gastric body. I independently reviewed radiology images for studies I described above or studies I have ordered. Admission date (for inpatients): 12/2/2018 * No surgery found *  * No surgery found * ASSESSMENT/PLAN: 
Problem List  Date Reviewed: 11/6/2018 Codes Class Noted Small bowel obstruction ([facility] ICD-10-CM: R17.021 ICD-9-CM: 560.9  12/2/2018 * (Principal) SBO (small bowel obstruction) ([facility] ICD-10-CM: H91.676 ICD-9-CM: 560.9  12/2/2018 Iron overload ICD-10-CM: E83.19 ICD-9-CM: 275.09  6/29/2016 RADHA on CPAP ICD-10-CM: G47.33, Z99.89 ICD-9-CM: 327.23, V46.8  1/20/2016 Myelodysplastic syndrome ([facility] ICD-10-CM: D46.9 ICD-9-CM: 238.75  Unknown Dyslipidemia ICD-10-CM: E78.5 ICD-9-CM: 272.4  7/2/2015 Osteoarthritis of multiple joints ICD-10-CM: M15.9 ICD-9-CM: 715.89  7/2/2015 Restless leg ICD-10-CM: G25.81 ICD-9-CM: 333.94  7/2/2015 HTN (hypertension) ICD-10-CM: I10 
ICD-9-CM: 401.9  4/9/2014 Thoracic myelopathy ICD-10-CM: M47.14 
ICD-9-CM: 721.41 Present on Admission 1/29/2013 Anemia of renal disease ICD-10-CM: D63.1 ICD-9-CM: 285.21  12/20/2010 Overview Addendum 9/6/2013 10:12 AM by Omar Rosales Low epo levels on procrit response 9-06-13 no need for procrit for some time hemoglobin 10 .4 check monthly see 6 monthly Iron deficiency ICD-10-CM: E61.1 ICD-9-CM: 280.9  11/19/2010 Overview Addendum 9/6/2013 10:11 AM by Omar Rosales Supportive Care Flowsheet 11/23/2010 11/23/2010 Day, Cycle Day 1, Cycle 1   
ferumoxytol (FERAHEME) IV    
iron dextran complex 50 mg/mL (INFED) IV [ 25 mg ] 1,000 mg Supportive Care Flowsheet 5/28/2012 6/4/2012 Day, Cycle Day 1, Cycle 1 Day 8, Cycle 1  
ferumoxytol (FERAHEME)  mg 510 mg  
iron dextran complex 50 mg/mL (INFED) IV    
5-2012 repeated 11-07-12 low iron stores regular follow up transferrin saturation  And replacement 9-06-13 iron stores still good  Results for Kailee Pellet () as of 9/6/2013 10:09 
 9/6/2013 Iron 76 TIBC 262 Transferrin Saturation 29 Ferritin 1203 (H) Acute kidney injury ([facility] [address] ICD-10-CM: N17.9 ICD-9-CM: 584.9  11/1/2010 Principal Problem: 
  SBO (small bowel obstruction) ([address] (12/2/2018) Active Problems: 
  HTN (hypertension) (4/9/2014) Dyslipidemia (7/2/2015) Myelodysplastic syndrome ([address] () Small bowel obstruction ([address] (12/2/2018) Continue care per primary NPO/NGT for bowel rest 
Await CT Signed:  Stanislav Avila NP

## 2018-12-05 NOTE — INTERVAL H&P NOTE
H&P Update: 
Diane Stubbs was seen and examined. History and physical has been reviewed. The patient has been examined. There have been no significant clinical changes since the completion of the originally dated History and Physical. 
Patient identified by surgeon; surgical site was confirmed by patient and surgeon. CT scan confirms complete SBO, secondary to incarceration of his umbilical hernia. I would recommend surgical intervention this afternoon.   
 
Signed By: Musa Carlos MD   
 December 5, 2018 3:49 PM

## 2018-12-06 LAB
ANION GAP SERPL CALC-SCNC: 10 MMOL/L (ref 7–16)
BASOPHILS # BLD: 0 K/UL (ref 0–0.2)
BASOPHILS NFR BLD: 0 % (ref 0–2)
BUN SERPL-MCNC: 21 MG/DL (ref 8–23)
CALCIUM SERPL-MCNC: 7.9 MG/DL (ref 8.3–10.4)
CHLORIDE SERPL-SCNC: 98 MMOL/L (ref 98–107)
CO2 SERPL-SCNC: 26 MMOL/L (ref 21–32)
CREAT SERPL-MCNC: 0.92 MG/DL (ref 0.8–1.5)
DIFFERENTIAL METHOD BLD: ABNORMAL
EOSINOPHIL # BLD: 0 K/UL (ref 0–0.8)
EOSINOPHIL NFR BLD: 0 % (ref 0.5–7.8)
ERYTHROCYTE [DISTWIDTH] IN BLOOD BY AUTOMATED COUNT: 14.4 % (ref 11.9–14.6)
GLUCOSE SERPL-MCNC: 120 MG/DL (ref 65–100)
HCT VFR BLD AUTO: 23.9 % (ref 41.1–50.3)
HGB BLD-MCNC: 7.9 G/DL (ref 13.6–17.2)
IMM GRANULOCYTES # BLD: 0.1 K/UL (ref 0–0.5)
IMM GRANULOCYTES NFR BLD AUTO: 1 % (ref 0–5)
LYMPHOCYTES # BLD: 0.3 K/UL (ref 0.5–4.6)
LYMPHOCYTES NFR BLD: 2 % (ref 13–44)
MCH RBC QN AUTO: 32.6 PG (ref 26.1–32.9)
MCHC RBC AUTO-ENTMCNC: 33.1 G/DL (ref 31.4–35)
MCV RBC AUTO: 98.8 FL (ref 79.6–97.8)
MONOCYTES # BLD: 0.5 K/UL (ref 0.1–1.3)
MONOCYTES NFR BLD: 4 % (ref 4–12)
NEUTS SEG # BLD: 10.9 K/UL (ref 1.7–8.2)
NEUTS SEG NFR BLD: 93 % (ref 43–78)
NRBC # BLD: 0 K/UL (ref 0–0.2)
PLATELET # BLD AUTO: 168 K/UL (ref 150–450)
PMV BLD AUTO: 10.5 FL (ref 9.4–12.3)
POTASSIUM SERPL-SCNC: 4.3 MMOL/L (ref 3.5–5.1)
RBC # BLD AUTO: 2.42 M/UL (ref 4.23–5.6)
SODIUM SERPL-SCNC: 134 MMOL/L (ref 136–145)
WBC # BLD AUTO: 11.7 K/UL (ref 4.3–11.1)

## 2018-12-06 PROCEDURE — 74011250636 HC RX REV CODE- 250/636: Performed by: INTERNAL MEDICINE

## 2018-12-06 PROCEDURE — 74011000258 HC RX REV CODE- 258: Performed by: SURGERY

## 2018-12-06 PROCEDURE — 74011250636 HC RX REV CODE- 250/636: Performed by: SURGERY

## 2018-12-06 PROCEDURE — 85025 COMPLETE CBC W/AUTO DIFF WBC: CPT

## 2018-12-06 PROCEDURE — 74011250637 HC RX REV CODE- 250/637: Performed by: INTERNAL MEDICINE

## 2018-12-06 PROCEDURE — 80048 BASIC METABOLIC PNL TOTAL CA: CPT

## 2018-12-06 PROCEDURE — 36415 COLL VENOUS BLD VENIPUNCTURE: CPT

## 2018-12-06 PROCEDURE — 65270000029 HC RM PRIVATE

## 2018-12-06 RX ADMIN — CEFOXITIN SODIUM 2 G: 2 POWDER, FOR SOLUTION INTRAVENOUS at 13:19

## 2018-12-06 RX ADMIN — ASPIRIN 81 MG: 81 TABLET, COATED ORAL at 08:20

## 2018-12-06 RX ADMIN — GABAPENTIN 400 MG: 400 CAPSULE ORAL at 21:07

## 2018-12-06 RX ADMIN — Medication 10 ML: at 21:06

## 2018-12-06 RX ADMIN — FOLIC ACID 1 MG: 1 TABLET ORAL at 08:19

## 2018-12-06 RX ADMIN — Medication 10 ML: at 05:31

## 2018-12-06 RX ADMIN — Medication 10 ML: at 16:50

## 2018-12-06 RX ADMIN — PRAMIPEXOLE DIHYDROCHLORIDE 0.5 MG: 0.25 TABLET ORAL at 11:25

## 2018-12-06 RX ADMIN — ENOXAPARIN SODIUM 40 MG: 40 INJECTION SUBCUTANEOUS at 16:51

## 2018-12-06 RX ADMIN — CEFOXITIN SODIUM 2 G: 2 POWDER, FOR SOLUTION INTRAVENOUS at 08:18

## 2018-12-06 RX ADMIN — SODIUM CHLORIDE AND POTASSIUM CHLORIDE: 4.5; 1.49 INJECTION, SOLUTION INTRAVENOUS at 08:20

## 2018-12-06 RX ADMIN — PRAMIPEXOLE DIHYDROCHLORIDE 0.5 MG: 0.25 TABLET ORAL at 16:51

## 2018-12-06 RX ADMIN — PRAMIPEXOLE DIHYDROCHLORIDE 0.5 MG: 0.25 TABLET ORAL at 21:06

## 2018-12-06 RX ADMIN — SODIUM CHLORIDE AND POTASSIUM CHLORIDE: 4.5; 1.49 INJECTION, SOLUTION INTRAVENOUS at 00:01

## 2018-12-06 RX ADMIN — LATANOPROST 1 DROP: 50 SOLUTION OPHTHALMIC at 21:07

## 2018-12-06 RX ADMIN — LISINOPRIL AND HYDROCHLOROTHIAZIDE 1 TABLET: 25; 20 TABLET ORAL at 08:19

## 2018-12-06 RX ADMIN — GABAPENTIN 400 MG: 400 CAPSULE ORAL at 08:19

## 2018-12-06 RX ADMIN — VITAMIN D, TAB 1000IU (100/BT) 1000 UNITS: 25 TAB at 08:19

## 2018-12-06 RX ADMIN — CEFOXITIN SODIUM 2 G: 2 POWDER, FOR SOLUTION INTRAVENOUS at 01:51

## 2018-12-06 RX ADMIN — PRAMIPEXOLE DIHYDROCHLORIDE 0.5 MG: 0.25 TABLET ORAL at 13:19

## 2018-12-06 RX ADMIN — SIMVASTATIN 20 MG: 20 TABLET, FILM COATED ORAL at 21:06

## 2018-12-06 RX ADMIN — GABAPENTIN 400 MG: 400 CAPSULE ORAL at 16:51

## 2018-12-06 RX ADMIN — PRAMIPEXOLE DIHYDROCHLORIDE 0.5 MG: 0.25 TABLET ORAL at 05:31

## 2018-12-06 NOTE — PROGRESS NOTES
PLAN: 
DC NGT Clear liquid diet, advance as maria siabel Decrease IVF HH in AM 
Happy to take over as primary Hopefully home tomorrow ASSESSMENT:  Admit Date: 12/2/2018 1 Day Post-Op  Procedure(s): 
REPAIR OF INCARCERATED HERNIA Principal Problem: 
  SBO (small bowel obstruction) ([facility] [address] (12/2/2018) Active Problems: 
  HTN (hypertension) (4/9/2014) Dyslipidemia (7/2/2015) Myelodysplastic syndrome ([facility] [address] () Small bowel obstruction ([facility] [address] (12/2/2018) Steve Gonzalez is a 66 y.o. male who we are asked by Dr. Arnold Keep to see for a small bowel obstruction. The patient was admitted on 12/2/18 after reporting to the ER with a 2 day history of nausea and vomiting. A KUB was obtained that is concerning for a SBO. No CT has been obtained. He has a PMHx of HTN, multiple chronic joint problems, s/p cholecystectomy, MDD (requires phlebotomy for iron overload), and glaucoma. He states he had a similar episode in September of 2018 that reserved with conservative measures. He said after his last SBO he had extensive work-up to include a colonoscopy, and EGD reportedly without any acute findings. A CTAP was obtained 11/6 with full thickening of the distal small bowel loops with injection of the mesentery noted (full results listed below). At present his abdomen is soft and non-tender. He has a NGT inserted with brown output noted in canister. WBC is 12.5, Na 133, K+3.3. He denies fever or chills. He denies constipation or diarrhea. He was taken to the OR on 12/5 for CT findings of an incarcerated umbilical hernia. The hernia was repaired and all bowel found to be viable. SUBJECTIVE: 
Awake in bed, pain well controlled. Wants to go home soon. +flatus/+BM. Hgb 7.9 this AM 
 
Intake/Output Summary (Last 24 hours) at 12/6/2018 6720 Last data filed at 12/6/2018 9131 Gross per 24 hour Intake 1234 ml Output 1805 ml Net -571 ml OBJECTIVE: 
 Constitutional: Alert oriented cooperative patient in no acute distress; appears stated age Visit Vitals /64 Pulse 76 Temp 98.5 °F (36.9 °C) Resp 16 Ht 4' 11\" (1.499 m) Wt 165 lb (74.8 kg) SpO2 95% BMI 33.33 kg/m² Eyes:Sclera are clear. ENMT: no external lesions gross hearing normal; no obvious neck masses, no ear or lip lesions, +NGT 
CV: RRR. Normal perfusion Resp: No JVD. Breathing is  non-labored; no audible wheezing. GI: soft and non-distended, incision c/d/i Musculoskeletal: unremarkable with normal function. No embolic signs or cyanosis. Neuro:  Oriented; moves all 4; no focal deficits Psychiatric: normal affect and mood, no memory impairment Patient Vitals for the past 24 hrs: 
 BP Temp Pulse Resp SpO2 Height Weight 12/06/18 0807 111/64 98.5 °F (36.9 °C) 76 16 95 %    
12/06/18 0332 115/64 98 °F (36.7 °C) 82 16 95 %    
12/06/18 0000 112/65 97.9 °F (36.6 °C) 80 16 94 %    
12/05/18 2012 131/73 98.1 °F (36.7 °C) 78 16 97 %    
12/05/18 1932 131/65  76 16 96 %    
12/05/18 1917 131/58 98.1 °F (36.7 °C) 77 15 96 %    
12/05/18 1912 135/63  76 15 96 %    
12/05/18 1907 128/61  80 18 96 %    
12/05/18 1902 130/61  80 12 96 %    
12/05/18 1857 132/58  80 17 97 %    
12/05/18 1852 136/64  81 21 97 %    
12/05/18 1847 133/62  79 14 97 %    
12/05/18 1842 134/56  81 16 97 %    
12/05/18 1838 138/83 97.8 °F (36.6 °C) 83 14 98 %    
12/05/18 1626 163/88 99.1 °F (37.3 °C) 78 16 95 % 4' 11\" (1.499 m) 165 lb (74.8 kg) 12/05/18 1151 146/75 97.6 °F (36.4 °C) 79 16 97 %   Labs:   
Recent Labs 12/06/18 
0501 WBC 11.7* HGB 7.9*  
 * K 4.3 CL 98  
CO2 26 BUN 21  
CREA 0.92  
* Signed:  Phylicia Rosa NP I have seen and examined the patient with the nurse practitioner and agree with the above assessment and plan.  
 
Sandeep Mark MD

## 2018-12-06 NOTE — PERIOP NOTES
TRANSFER - OUT REPORT: 
 
Verbal report given to jesús Ramirez Rick  being transferred to [facility] for routine post - op Report consisted of patients Situation, Background, Assessment and  
Recommendations(SBAR). Information from the following report(s) OR Summary, Intake/Output and MAR was reviewed with the receiving nurse. Lines:  
Peripheral IV 12/02/18 Right Antecubital (Active) Site Assessment Clean, dry, & intact 12/5/2018  6:38 PM  
Phlebitis Assessment 0 12/5/2018  6:38 PM  
Infiltration Assessment 0 12/5/2018  6:38 PM  
Dressing Status Clean, dry, & intact 12/5/2018  6:38 PM  
Dressing Type Transparent 12/5/2018  6:38 PM  
Hub Color/Line Status Pink; Infusing 12/5/2018  6:38 PM  
   
Peripheral IV 12/04/18 Anterior;Distal;Inferior; Lower;Right Arm (Active) Site Assessment Clean, dry, & intact 12/5/2018  6:38 PM  
Phlebitis Assessment 0 12/5/2018  6:38 PM  
Infiltration Assessment 0 12/5/2018  6:38 PM  
Dressing Status Clean, dry, & intact 12/5/2018  6:38 PM  
Dressing Type Transparent 12/5/2018  6:38 PM  
Hub Color/Line Status Blue;Capped 12/5/2018  6:38 PM  
  
 
Opportunity for questions and clarification was provided. Patient transported with: 
 O2 @ 2 liters 
 
ngt taped at 50cm

## 2018-12-06 NOTE — PROGRESS NOTES
Hospitalist Progress Note   
2018 Admit Date: 2018 12:58 PM  
NAME: Magdalena Avelar :  1940 MRN:  [id number] Attending: Toshia Patel MD 
PCP:  Hilda Washington MD 
 
SUBJECTIVE: As Per Prior Notes: 
\" 
67 yo CM with past history of HTN, multiple chronic joint problems s/p multiple surgeries, s/p cholecystectomy, MDD (requires phlebotomy for iron overload), and glaucoma presents with a 2-day history of intractable nausea/vomiting and inability to keep food down since eating soup on Friday. Patient has had a SBO in 2018 that was treated conservatively with NG tube. Patient did try to drink a Coke this morning and had vomiting afterwards. He had a small bowel movement yesterday. No fevers/chills, chest pain, headache, BRBPR, melena. 
  
ED Course: KUB showing dilated bowel with air-fluid levels suggestive of SBO. CBC showing mildly elevated WBC count of 14 and (known) chronic anemia. Patient receiving aerosolized lidocaine prior to insertion of NG tube 
  
Interval History (12/3): patient examined at bedside. No acute events overnight. NG tube in place to suction with ~1 liter of brown output. No fevers/chills, abdominal pain, shortness of breath, chest pain. Has not passed gas or had a bowel movement yet. Patient anxious about what to do next. \" 
 
 
2018- pt seen - he and his wife are concerned about no resolution of the sbo yet- previously had it and resolved in 2 days. 2018- patient seen- still with NGT to suction. No BM or gas passed. No abdominal pain. Ct abd/pelvis pending 2018- Patient seen s/p surgery- eating and passing gas s/p surgery - hopeful for dc ocby. Review of Systems negative with exception of pertinent positives noted above PHYSICAL EXAM  
 
Visit Vitals /71 Pulse 73 Temp 98.3 °F (36.8 °C) Resp 16 Ht 4' 11\" (1.499 m) Wt 74.8 kg (165 lb) SpO2 97% BMI 33.33 kg/m² Temp (24hrs), Av.1 °F (36.7 °C), Min:97.8 °F (36.6 °C), Max:98.5 °F (36.9 °C) Oxygen Therapy O2 Sat (%): 97 % (18 1523) Pulse via Oximetry: 77 beats per minute (18 193) O2 Device: Nasal cannula (18) O2 Flow Rate (L/min): 2 l/min (18) Intake/Output Summary (Last 24 hours) at 2018 1812 Last data filed at 2018 1715 Gross per 24 hour Intake 1234 ml Output 1495 ml Net -261 ml General:          Alert, cooperative, no distress, appears stated age. Head:               Normocephalic, without obvious abnormality, atraumatic. Nose:               Nares normal. No drainage or sinus tenderness. Lungs:             Clear to auscultation bilaterally. No Wheezing or Rhonchi. No rales. Heart:              Regular rate and rhythm,  no murmur, rub or gallop. Abdomen:        seems more distended today, hyperactive bowel sounds, no TTP, no rebound tenderness Extremities:     No cyanosis. No edema. No clubbing Skin:                Texture, turgor normal. No rashes or lesions. Not Jaundiced Neurologic:      Alert and oriented x 3, no focal deficits ASSESSMENT Active Hospital Problems Diagnosis Date Noted  Small bowel obstruction ([address] 2018  
 SBO (small bowel obstruction) ([address] 2018  Myelodysplastic syndrome ([address]  Dyslipidemia 2015  
 HTN (hypertension) 2014 Plan: · Mechanical SBO s/p surgical reduction - general surgery has taken over as primary · We ill sign off ·  call as needed 
  
 
Ppx: Lovenox for VTE 
  
Code Status: FULL CODE Signed By: Berta Olivares MD   
 2018

## 2018-12-06 NOTE — ANESTHESIA POSTPROCEDURE EVALUATION
Procedure(s): 
REPAIR OF INCARCERATED HERNIA. Anesthesia Post Evaluation Multimodal analgesia: multimodal analgesia not used between 6 hours prior to anesthesia start to PACU discharge Patient location during evaluation: bedside Patient participation: complete - patient participated Level of consciousness: awake and alert Pain score: 3 Pain management: adequate Airway patency: patent Anesthetic complications: no 
Cardiovascular status: acceptable and hemodynamically stable Respiratory status: acceptable Hydration status: acceptable Visit Vitals BP (P) 131/58 (BP 1 Location: Left arm, BP Patient Position: At rest) Pulse 77 Temp (P) 36.7 °C (98.1 °F) Resp 15 Ht 4' 11\" (1.499 m) Wt 74.8 kg (165 lb) SpO2 96% BMI 33.33 kg/m²

## 2018-12-07 VITALS
DIASTOLIC BLOOD PRESSURE: 73 MMHG | BODY MASS INDEX: 32.39 KG/M2 | TEMPERATURE: 98.1 F | HEART RATE: 72 BPM | WEIGHT: 165 LBS | SYSTOLIC BLOOD PRESSURE: 123 MMHG | HEIGHT: 60 IN | RESPIRATION RATE: 17 BRPM | OXYGEN SATURATION: 97 %

## 2018-12-07 LAB
HCT VFR BLD AUTO: 22.5 % (ref 41.1–50.3)
HGB BLD-MCNC: 7.5 G/DL (ref 13.6–17.2)

## 2018-12-07 PROCEDURE — 74011250636 HC RX REV CODE- 250/636: Performed by: NURSE PRACTITIONER

## 2018-12-07 PROCEDURE — 36415 COLL VENOUS BLD VENIPUNCTURE: CPT

## 2018-12-07 PROCEDURE — 74011250637 HC RX REV CODE- 250/637: Performed by: INTERNAL MEDICINE

## 2018-12-07 PROCEDURE — 85018 HEMOGLOBIN: CPT

## 2018-12-07 RX ADMIN — FOLIC ACID 1 MG: 1 TABLET ORAL at 08:04

## 2018-12-07 RX ADMIN — PRAMIPEXOLE DIHYDROCHLORIDE 0.5 MG: 0.25 TABLET ORAL at 06:02

## 2018-12-07 RX ADMIN — LISINOPRIL AND HYDROCHLOROTHIAZIDE 1 TABLET: 25; 20 TABLET ORAL at 08:04

## 2018-12-07 RX ADMIN — VITAMIN D, TAB 1000IU (100/BT) 1000 UNITS: 25 TAB at 08:04

## 2018-12-07 RX ADMIN — ASPIRIN 81 MG: 81 TABLET, COATED ORAL at 08:04

## 2018-12-07 RX ADMIN — Medication 10 ML: at 06:14

## 2018-12-07 RX ADMIN — GABAPENTIN 400 MG: 400 CAPSULE ORAL at 08:04

## 2018-12-07 RX ADMIN — SODIUM CHLORIDE AND POTASSIUM CHLORIDE: 4.5; 1.49 INJECTION, SOLUTION INTRAVENOUS at 01:41

## 2018-12-07 NOTE — DISCHARGE INSTRUCTIONS
Discharge Instructions/Follow-up Plans:   MD Instructions:     Follow-up with General Surgery in 1 week. Keep incisions clean and dry, may remain uncovered. Do not apply lotions, creams or ointments to incisions.     Diet - as tolerated - Soft foods diet. Activity - ambulate - as tolerated - no heavy lifting >10lb. May shower - no tub baths or soaking/submerging.     No driving while taking narcotics. Do not drink alcohol while taking narcotics. Resume other home medications.      If problems or questions arise, please call our office at [phone].     Greater than 30 minutes were spent discharging the patient          DISCHARGE SUMMARY from Nurse    PATIENT INSTRUCTIONS:    After general anesthesia or intravenous sedation, for 24 hours or while taking prescription Narcotics:  · Limit your activities  · Do not drive and operate hazardous machinery  · Do not make important personal or business decisions  · Do  not drink alcoholic beverages  · If you have not urinated within 8 hours after discharge, please contact your surgeon on call. Report the following to your surgeon:  · Excessive pain, swelling, redness or odor of or around the surgical area  · Temperature over 100.5  · Nausea and vomiting lasting longer than 4 hours or if unable to take medications  · Any signs of decreased circulation or nerve impairment to extremity: change in color, persistent  numbness, tingling, coldness or increase pain  · Any questions    What to do at Home:  Recommended activity: per MD instructions    If you experience any of the following symptoms fever > 100.5, nausea, vomiting, pain without relief of pain medications, chest pain and/or shortness of breath to ER please follow up with MD.    *  Please give a list of your current medications to your Primary Care Provider.     *  Please update this list whenever your medications are discontinued, doses are      changed, or new medications (including over-the-counter products) are added. *  Please carry medication information at all times in case of emergency situations. These are general instructions for a healthy lifestyle:    No smoking/ No tobacco products/ Avoid exposure to second hand smoke  Surgeon General's Warning:  Quitting smoking now greatly reduces serious risk to your health. Obesity, smoking, and sedentary lifestyle greatly increases your risk for illness    A healthy diet, regular physical exercise & weight monitoring are important for maintaining a healthy lifestyle    You may be retaining fluid if you have a history of heart failure or if you experience any of the following symptoms:  Weight gain of 3 pounds or more overnight or 5 pounds in a week, increased swelling in our hands or feet or shortness of breath while lying flat in bed. Please call your doctor as soon as you notice any of these symptoms; do not wait until your next office visit. Recognize signs and symptoms of STROKE:    F-face looks uneven    A-arms unable to move or move unevenly    S-speech slurred or non-existent    T-time-call 911 as soon as signs and symptoms begin-DO NOT go       Back to bed or wait to see if you get better-TIME IS BRAIN. Warning Signs of HEART ATTACK     Call 911 if you have these symptoms:   Chest discomfort. Most heart attacks involve discomfort in the center of the chest that lasts more than a few minutes, or that goes away and comes back. It can feel like uncomfortable pressure, squeezing, fullness, or pain.  Discomfort in other areas of the upper body. Symptoms can include pain or discomfort in one or both arms, the back, neck, jaw, or stomach.  Shortness of breath with or without chest discomfort.  Other signs may include breaking out in a cold sweat, nausea, or lightheadedness. Don't wait more than five minutes to call 911 - MINUTES MATTER! Fast action can save your life.  Calling 911 is almost always the fastest way to get lifesaving treatment. Emergency Medical Services staff can begin treatment when they arrive -- up to an hour sooner than if someone gets to the hospital by car. The discharge information has been reviewed with the patient. The patient verbalized understanding. Discharge medications reviewed with the patient and appropriate educational materials and side effects teaching were provided.   ___________________________________________________________________________________________________________________________________

## 2018-12-07 NOTE — PROGRESS NOTES
Discharge instructions reviewed with pt and signed copy placed on chart. Time taken to answer all questions.

## 2018-12-07 NOTE — PROGRESS NOTES
LATE NOTE: In accordance with Medicare Guidelines, a second copy of the Important Letter from Medicare was presented to the patient in anticipation of expected discharge within 48 hours. An oral explanation was provided and all questions were answered. Patient signed one second copy of the Important Letter from Medicare which was placed in the patient's medical chart, and a second copy of the second Important Letter from Medicare was provided to the patient. Care Managers were notified.

## 2018-12-07 NOTE — DISCHARGE SUMMARY
[facility] [address]  [place], [address] [facility]  [phone]   Discharge Summary     Td Martin  MRN: [id number]     : 1940     Age: 66 y.o. Admit date: 2018     Discharge date: 2018  Attending Physician: Dr. Fede Vidal  Primary Discharge Diagnosis:   Principal Problem:    SBO (small bowel obstruction) ([facility] [address] (2018)    Active Problems:    HTN (hypertension) (2014)      Dyslipidemia (2015)      Myelodysplastic syndrome ([address] ()      Small bowel obstruction ([facility] [address] (2018)      Primary Operations or Procedures Performed :  Procedure(s):  REPAIR OF INCARCERATED HERNIA     Brief History and Reason for Admission: Td Martin was admitted with the following history of present illness. Asad Loza F 19 y. o. male who we are asked by Dr. Lester Roche to see for a small bowel obstruction. The patient was admitted on 18 after reporting to the ER with a 2 day history of nausea and vomiting. A KUB was obtained that is concerning for a SBO. No CT has been obtained. Paz Amaya has a PMHx of HTN, multiple chronic joint problems, s/p cholecystectomy, MDD (requires phlebotomy for iron overload), and glaucoma. He states he had a similar episode in 2018 that reserved with conservative measures. He said after his last SBO he had extensive work-up to include a colonoscopy, and EGD reportedly without any acute findings. A CTAP was obtained  with full thickening of the distal small bowel loops with injection of the mesentery noted (full results listed below).  At present his abdomen is soft and non-tender. He has a NGT inserted with brown output noted in canister. WBC is 12.5, Na 133, K+3.3. He denies fever or chills. He denies constipation or diarrhea.      He was taken to the OR on  for CT findings of an incarcerated umbilical hernia. The hernia was repaired and all bowel found to be viable. Hospital Course:   The patient was admitted on 12/2/18 after reporting to the ER with a 2 day history of nausea and vomiting. A KUB was obtained that is concerning for a SBO. No CT has been obtained. [facility] has a PMHx of HTN, multiple chronic joint problems, s/p cholecystectomy, MDD (requires phlebotomy for iron overload), and glaucoma. He states he had a similar episode in September of 2018 that reserved with conservative measures. He said after his last SBO he had extensive work-up to include a colonoscopy, and EGD reportedly without any acute findings. A CTAP was obtained 11/6 with full thickening of the distal small bowel loops with injection of the mesentery noted (full results listed below).  At present his abdomen is soft and non-tender. He has a NGT inserted with brown output noted in canister. WBC is 12.5, Na 133, K+3.3. He denies fever or chills. He denies constipation or diarrhea.      He was taken to the OR on 12/5 for CT findings of an incarcerated umbilical hernia. The hernia was repaired and all bowel found to be viable. On POD #1 -Awake in bed, pain well controlled. Wants to go home soon. +flatus/+BM. Hgb 7.9 this AM    On POD #2- Awake in bed, pain well controlled. Wants to go home soon. +flatus/+BM. Tolerating diet without N/V. AF, VSS BM X 6. Hgb 7.9-->7.5 this AM    Condition at Discharge: Good    Discharge Medications:   Current Discharge Medication List      CONTINUE these medications which have NOT CHANGED    Details   multivit-min/FA/lycopen/lutein (CENTRUM SILVER MEN PO) Take  by mouth daily. vit C/E/Zn/coppr/lutein/zeaxan (PRESERVISION AREDS-2 PO) Take  by mouth. cholecalciferol (VITAMIN D3) 1,000 unit tablet Take 1,000 Units by mouth daily. omega 3-dha-epa-fish oil (FISH OIL) 100-160-1,000 mg cap Take  by mouth daily.       simvastatin (ZOCOR) 20 mg tablet 1 po qd  Qty: 90 Tab, Refills: 3    Associated Diagnoses: Dyslipidemia      lisinopril-hydroCHLOROthiazide (ZESTORETIC) 20-25 mg per tablet 1 po qd  Qty: 90 Tab, Refills: 3    Associated Diagnoses: Essential hypertension      gabapentin (NEURONTIN) 400 mg capsule 1 po tid  Qty: 270 Cap, Refills: 3    Associated Diagnoses: Restless leg      pramipexole (MIRAPEX) 0.5 mg tablet 1 po 5 times daily  Qty: 450 Tab, Refills: 3    Associated Diagnoses: Restless leg      folic acid (FOLVITE) 1 mg tablet Take  by mouth daily. Associated Diagnoses: Myelodysplastic syndrome ([facility] [address] Iron overload      cpap machine kit by Does Not Apply route. 12cm      aspirin delayed-release 81 mg tablet Take  by mouth daily. latanoprost (XALATAN) 0.005 % ophthalmic solution Administer 1 Drop to both eyes nightly. Indications: OPEN ANGLE GLAUCOMA      triamcinolone acetonide (KENALOG) 0.1 % topical cream Apply  to affected area two (2) times daily as needed for Skin Irritation. use thin layer         STOP taking these medications       meloxicam (MOBIC) 15 mg tablet Comments:   Reason for Stopping:                 Disposition: good    Discharge Instructions/Follow-up Care:      Discharge Instructions/Follow-up Plans:   MD Instructions:     Follow-up with General Surgery in 1 week. Follow up with PCP for myelodysplastic syndrome for recheck of CBC. Keep incisions clean and dry, may remain uncovered. Do not apply lotions, creams or ointments to incisions.     Diet - as tolerated - Soft foods diet. Activity - ambulate - as tolerated - no heavy lifting >10lb. May shower - no tub baths or soaking/submerging.     No driving while taking narcotics. Do not drink alcohol while taking narcotics.   Resume other home medications.      If problems or questions arise, please call our office at [phone].     Greater than 30 minutes were spent discharging the patient          DISCHARGE SUMMARY from Nurse    PATIENT INSTRUCTIONS:    After general anesthesia or intravenous sedation, for 24 hours or while taking prescription Narcotics:  · Limit your activities  · Do not drive and operate hazardous machinery  · Do not make important personal or business decisions  · Do  not drink alcoholic beverages  · If you have not urinated within 8 hours after discharge, please contact your surgeon on call. Report the following to your surgeon:  · Excessive pain, swelling, redness or odor of or around the surgical area  · Temperature over 100.5  · Nausea and vomiting lasting longer than 4 hours or if unable to take medications  · Any signs of decreased circulation or nerve impairment to extremity: change in color, persistent  numbness, tingling, coldness or increase pain  · Any questions    What to do at Home:  Recommended activity: per MD instructions    If you experience any of the following symptoms fever > 100.5, nausea, vomiting, pain without relief of pain medications, chest pain and/or shortness of breath to ER please follow up with MD.    *  Please give a list of your current medications to your Primary Care Provider. *  Please update this list whenever your medications are discontinued, doses are      changed, or new medications (including over-the-counter products) are added. *  Please carry medication information at all times in case of emergency situations. These are general instructions for a healthy lifestyle:    No smoking/ No tobacco products/ Avoid exposure to second hand smoke  Surgeon General's Warning:  Quitting smoking now greatly reduces serious risk to your health. Obesity, smoking, and sedentary lifestyle greatly increases your risk for illness    A healthy diet, regular physical exercise & weight monitoring are important for maintaining a healthy lifestyle    You may be retaining fluid if you have a history of heart failure or if you experience any of the following symptoms:  Weight gain of 3 pounds or more overnight or 5 pounds in a week, increased swelling in our hands or feet or shortness of breath while lying flat in bed.   Please call your doctor as soon as you notice any of these symptoms; do not wait until your next office visit. Recognize signs and symptoms of STROKE:    F-face looks uneven    A-arms unable to move or move unevenly    S-speech slurred or non-existent    T-time-call 911 as soon as signs and symptoms begin-DO NOT go       Back to bed or wait to see if you get better-TIME IS BRAIN. Warning Signs of HEART ATTACK     Call 911 if you have these symptoms:   Chest discomfort. Most heart attacks involve discomfort in the center of the chest that lasts more than a few minutes, or that goes away and comes back. It can feel like uncomfortable pressure, squeezing, fullness, or pain.  Discomfort in other areas of the upper body. Symptoms can include pain or discomfort in one or both arms, the back, neck, jaw, or stomach.  Shortness of breath with or without chest discomfort.  Other signs may include breaking out in a cold sweat, nausea, or lightheadedness. Don't wait more than five minutes to call [address]! Fast action can save your life. Calling 911 is almost always the fastest way to get lifesaving treatment. Emergency Medical Services staff can begin treatment when they arrive  up to an hour sooner than if someone gets to the hospital by car. The discharge information has been reviewed with the patient. The patient verbalized understanding. Discharge medications reviewed with the patient and appropriate educational materials and side effects teaching were provided.   ___________________________________________________________________________________________________________________________________      Signed:  Orvil Skiff, PA   12/7/2018  10:39 AM

## 2018-12-07 NOTE — PROGRESS NOTES
PLAN: 
Advance to GI soft diet  7.5/22. Will follow up outpatient. D/C home today ASSESSMENT:  Admit Date: 12/2/2018 2 Day Post-Op  Procedure(s): 
REPAIR OF INCARCERATED HERNIA Principal Problem: 
  SBO (small bowel obstruction) ([address] (12/2/2018) Active Problems: 
  HTN (hypertension) (4/9/2014) Dyslipidemia (7/2/2015) Myelodysplastic syndrome ([facility] [address] () Small bowel obstruction ([facility] [address] (12/2/2018) Raheel Brito is a 66 y.o. male who we are asked by Dr. Ronaldo Talbot to see for a small bowel obstruction. The patient was admitted on 12/2/18 after reporting to the ER with a 2 day history of nausea and vomiting. A KUB was obtained that is concerning for a SBO. No CT has been obtained. He has a PMHx of HTN, multiple chronic joint problems, s/p cholecystectomy, MDD (requires phlebotomy for iron overload), and glaucoma. He states he had a similar episode in September of 2018 that reserved with conservative measures. He said after his last SBO he had extensive work-up to include a colonoscopy, and EGD reportedly without any acute findings. A CTAP was obtained 11/6 with full thickening of the distal small bowel loops with injection of the mesentery noted (full results listed below). At present his abdomen is soft and non-tender. He has a NGT inserted with brown output noted in canister. WBC is 12.5, Na 133, K+3.3. He denies fever or chills. He denies constipation or diarrhea. He was taken to the OR on 12/5 for CT findings of an incarcerated umbilical hernia. The hernia was repaired and all bowel found to be viable. SUBJECTIVE: 
Awake in bed, pain well controlled. Wants to go home soon. +flatus/+BM. Tolerating diet without N/V. AF, VSS BM X 6. Hgb 7.9-->7.5 this AM 
 
Intake/Output Summary (Last 24 hours) at 12/7/2018 7405 Last data filed at 12/7/2018 5539 Gross per 24 hour Intake 1808 ml Output 840 ml Net 968 ml OBJECTIVE: 
 Constitutional: Alert oriented cooperative patient in no acute distress; appears stated age Visit Vitals /73 (BP 1 Location: Left arm, BP Patient Position: At rest) Pulse 72 Temp 98.1 °F (36.7 °C) Resp 17 Ht 4' 11\" (1.499 m) Wt 165 lb (74.8 kg) SpO2 97% BMI 33.33 kg/m² Eyes:Sclera are clear. ENMT: no external lesions gross hearing normal; no obvious neck masses, no ear or lip lesions, CV: RRR. Normal perfusion Resp: No JVD. Breathing is  non-labored; no audible wheezing. GI: soft and non-distended, staples intact, dressing with dry blooding drainage No active bleeding. + BS. Appropriately tender. Musculoskeletal: unremarkable with normal function. No embolic signs or cyanosis. Neuro:  Oriented; moves all 4; no focal deficits Psychiatric: normal affect and mood, no memory impairment Patient Vitals for the past 24 hrs: 
 BP Temp Pulse Resp SpO2  
12/07/18 0711 123/73 98.1 °F (36.7 °C) 72 17 97 % 12/07/18 0420 105/62 97.6 °F (36.4 °C) 69 17 95 % 12/06/18 2349 128/71 98.1 °F (36.7 °C) 81 17 97 % 12/06/18 1949 102/65 98.1 °F (36.7 °C) 73 17 97 % 12/06/18 1523 124/71 98.3 °F (36.8 °C) 73 16 97 % 12/06/18 1145 105/65 98 °F (36.7 °C) 73 16 96 % Labs:   
Recent Labs 12/07/18 
4191 12/06/18 
0501 WBC  --  11.7* HGB 7.5* 7.9*  
PLT  --  168 NA  --  134* K  --  4.3 CL  --  98  
CO2  --  26 BUN  --  21  
CREA  --  0.92  
GLU  --  120* Signed:  GERMAN Leiva

## 2018-12-08 NOTE — OP NOTES
[facility] REPORT    Juju Gaitan  MR#: [id number]  : 1940  ACCOUNT #: [de-identified]   DATE OF SERVICE: 2018    PREOPERATIVE DIAGNOSIS:  Small-bowel obstruction secondary to incarcerated umbilical hernia. POSTOPERATIVE:  Small-bowel obstruction secondary to incarcerated umbilical hernia. PROCEDURE PERFORMED:  Exploratory laparotomy, reduction of incarcerated small intestine and repair of umbilical hernia. SURGEON:  Temitope Thompson MD    ASSISTANT:  None. ANESTHESIA:  General anesthetic. ESTIMATED BLOOD LOSS:  20 mL. SPECIMENS REMOVED:  none    COMPLICATIONS:  none    IMPLANTS:  none    CONDITION AT COMPLETION:  Stable. INDICATIONS:  The patient is a 75-year-old white male who presented with signs and symptoms of small-bowel obstruction. The patient was found to have an umbilical hernia with incarcerated loop of small intestine. Risks, benefits and alternatives of surgical intervention were discussed in detail with the patient prior to surgery. He understood and wished to proceed and gave appropriate consent. DESCRIPTION OF PROCEDURE:  The patient was taken to the operating room where he underwent general endotracheal anesthetic with no difficulties. The abdomen was prepped and draped in routine sterile fashion. A timeout was performed identifying the patient, procedure and laterality. IV antibiotics were administered. A small incision was made vertically overlying the umbilicus and the hernia site. Careful dissection through subcutaneous layers to the hernia sac itself was performed with electrocautery. The sac was identified and carefully dissected out down to the fascial edges. Hernia sac was opened at its apex and loop of small intestine was encountered. This was carefully dissected free from adhesions to the hernia sac. The loop was viable with no sign of vascular compromise.   The fascial defect was carefully enlarged slightly to allow reduction of the small intestine without further difficulty. Hernia sac was then completely amputated with electrocautery. The fascial defect was closed in a primary fashion with interrupted 0 Prolene sutures. At the completion, 30 mL of Marcaine was injected at the incision site for anesthetic purposes. Wound was irrigated and closed with skin staples. The patient did well, was awakened, extubated, and taken to recovery in good condition.       Radene Ross, MD Zora Holter /   D: 12/07/2018 17:22     T: 12/08/2018 09:59  JOB #: [id number]

## 2018-12-10 ENCOUNTER — PATIENT OUTREACH (OUTPATIENT)
Dept: CASE MANAGEMENT | Age: 78
End: 2018-12-10

## 2018-12-10 NOTE — PROGRESS NOTES
This note will not be viewable in [address] Patient returned Care Coordinator's phone call stating that he is doing good and that there is no need for another call. He states that he has all of his medicine and doesn't have any questions or concerns.  Care Coordinator will resolve case and remove self from care team.

## 2018-12-10 NOTE — PROGRESS NOTES
This note will not be viewable in [address] 1st Attempt to contact patient for CORBIN call, no answer, left  for returned call. Will attempt to contact patient again within 24 hours

## 2018-12-13 ENCOUNTER — HOSPITAL ENCOUNTER (OUTPATIENT)
Dept: LAB | Age: 78
Discharge: HOME | End: 2018-12-13
Payer: MEDICARE

## 2018-12-13 DIAGNOSIS — D46.9 MYELODYSPLASTIC SYNDROME (HCC): ICD-10-CM

## 2018-12-13 PROBLEM — Z98.890 S/P EXPLORATORY LAPAROTOMY: Status: ACTIVE | Noted: 2018-12-13

## 2018-12-13 LAB
BASOPHILS # BLD: 0 K/UL (ref 0–0.2)
BASOPHILS NFR BLD: 0 % (ref 0–2)
DIFFERENTIAL METHOD BLD: ABNORMAL
EOSINOPHIL # BLD: 0.1 K/UL (ref 0–0.8)
EOSINOPHIL NFR BLD: 2 % (ref 0.5–7.8)
ERYTHROCYTE [DISTWIDTH] IN BLOOD BY AUTOMATED COUNT: 14.6 % (ref 11.9–14.6)
HCT VFR BLD AUTO: 22.9 % (ref 41.1–50.3)
HGB BLD-MCNC: 7.6 G/DL (ref 13.6–17.2)
IMM GRANULOCYTES # BLD: 0 K/UL (ref 0–0.5)
IMM GRANULOCYTES NFR BLD AUTO: 1 % (ref 0–5)
LYMPHOCYTES # BLD: 0.9 K/UL (ref 0.5–4.6)
LYMPHOCYTES NFR BLD: 14 % (ref 13–44)
MCH RBC QN AUTO: 32.2 PG (ref 26.1–32.9)
MCHC RBC AUTO-ENTMCNC: 33.2 G/DL (ref 31.4–35)
MCV RBC AUTO: 97 FL (ref 79.6–97.8)
MONOCYTES # BLD: 0.7 K/UL (ref 0.1–1.3)
MONOCYTES NFR BLD: 11 % (ref 4–12)
NEUTS SEG # BLD: 4.9 K/UL (ref 1.7–8.2)
NEUTS SEG NFR BLD: 73 % (ref 43–78)
NRBC # BLD: 0 K/UL (ref 0–0.2)
PLATELET # BLD AUTO: 191 K/UL (ref 150–450)
PMV BLD AUTO: 10.2 FL (ref 9.4–12.3)
RBC # BLD AUTO: 2.36 M/UL (ref 4.23–5.6)
WBC # BLD AUTO: 6.7 K/UL (ref 4.3–11.1)

## 2018-12-13 PROCEDURE — 85025 COMPLETE CBC W/AUTO DIFF WBC: CPT

## 2018-12-13 PROCEDURE — 36415 COLL VENOUS BLD VENIPUNCTURE: CPT

## 2018-12-17 ENCOUNTER — APPOINTMENT (OUTPATIENT)
Dept: INFUSION THERAPY | Age: 78
End: 2018-12-17

## 2018-12-18 ENCOUNTER — APPOINTMENT (OUTPATIENT)
Dept: INFUSION THERAPY | Age: 78
End: 2018-12-18

## 2019-01-04 ENCOUNTER — APPOINTMENT (OUTPATIENT)
Dept: INFUSION THERAPY | Age: 79
End: 2019-01-04

## 2019-02-04 ENCOUNTER — HOSPITAL ENCOUNTER (OUTPATIENT)
Dept: LAB | Age: 79
Discharge: HOME | End: 2019-02-04
Payer: MEDICARE

## 2019-02-04 ENCOUNTER — HOSPITAL ENCOUNTER (OUTPATIENT)
Dept: INFUSION THERAPY | Age: 79
Discharge: HOME | End: 2019-02-04

## 2019-02-04 DIAGNOSIS — D46.9 MYELODYSPLASTIC SYNDROME (HCC): ICD-10-CM

## 2019-02-04 DIAGNOSIS — E83.110 HEREDITARY HEMOCHROMATOSIS (HCC): ICD-10-CM

## 2019-02-04 LAB
ALBUMIN SERPL-MCNC: 4.2 G/DL (ref 3.2–4.6)
ALBUMIN/GLOB SERPL: 1.4 {RATIO} (ref 1.2–3.5)
ALP SERPL-CCNC: 82 U/L (ref 50–136)
ALT SERPL-CCNC: 25 U/L (ref 12–65)
ANION GAP SERPL CALC-SCNC: 4 MMOL/L (ref 7–16)
AST SERPL-CCNC: 22 U/L (ref 15–37)
BASOPHILS # BLD: 0 K/UL (ref 0–0.2)
BASOPHILS NFR BLD: 0 % (ref 0–2)
BILIRUB SERPL-MCNC: 0.4 MG/DL (ref 0.2–1.1)
BUN SERPL-MCNC: 31 MG/DL (ref 8–23)
CALCIUM SERPL-MCNC: 9.3 MG/DL (ref 8.3–10.4)
CHLORIDE SERPL-SCNC: 103 MMOL/L (ref 98–107)
CO2 SERPL-SCNC: 29 MMOL/L (ref 21–32)
CREAT SERPL-MCNC: 1.11 MG/DL (ref 0.8–1.5)
DIFFERENTIAL METHOD BLD: ABNORMAL
EOSINOPHIL # BLD: 0.1 K/UL (ref 0–0.8)
EOSINOPHIL NFR BLD: 2 % (ref 0.5–7.8)
ERYTHROCYTE [DISTWIDTH] IN BLOOD BY AUTOMATED COUNT: 14.5 % (ref 11.9–14.6)
FERRITIN SERPL-MCNC: 155 NG/ML (ref 8–388)
GLOBULIN SER CALC-MCNC: 3 G/DL (ref 2.3–3.5)
GLUCOSE SERPL-MCNC: 104 MG/DL (ref 65–100)
HCT VFR BLD AUTO: 30.5 % (ref 41.1–50.3)
HGB BLD-MCNC: 9.9 G/DL (ref 13.6–17.2)
IMM GRANULOCYTES # BLD AUTO: 0 K/UL (ref 0–0.5)
IMM GRANULOCYTES NFR BLD AUTO: 0 % (ref 0–5)
LYMPHOCYTES # BLD: 0.8 K/UL (ref 0.5–4.6)
LYMPHOCYTES NFR BLD: 16 % (ref 13–44)
MCH RBC QN AUTO: 31.8 PG (ref 26.1–32.9)
MCHC RBC AUTO-ENTMCNC: 32.5 G/DL (ref 31.4–35)
MCV RBC AUTO: 98.1 FL (ref 79.6–97.8)
MONOCYTES # BLD: 0.4 K/UL (ref 0.1–1.3)
MONOCYTES NFR BLD: 9 % (ref 4–12)
NEUTS SEG # BLD: 3.6 K/UL (ref 1.7–8.2)
NEUTS SEG NFR BLD: 72 % (ref 43–78)
NRBC # BLD: 0.01 K/UL (ref 0–0.2)
PLATELET # BLD AUTO: 149 K/UL (ref 150–450)
PMV BLD AUTO: 9.6 FL (ref 9.4–12.3)
POTASSIUM SERPL-SCNC: 4.8 MMOL/L (ref 3.5–5.1)
PROT SERPL-MCNC: 7.2 G/DL (ref 6.3–8.2)
RBC # BLD AUTO: 3.11 M/UL (ref 4.23–5.67)
SODIUM SERPL-SCNC: 136 MMOL/L (ref 136–145)
WBC # BLD AUTO: 5 K/UL (ref 4.3–11.1)

## 2019-02-04 PROCEDURE — 82728 ASSAY OF FERRITIN: CPT

## 2019-02-04 PROCEDURE — 80053 COMPREHEN METABOLIC PANEL: CPT

## 2019-02-04 PROCEDURE — 36415 COLL VENOUS BLD VENIPUNCTURE: CPT

## 2019-02-04 PROCEDURE — 85025 COMPLETE CBC W/AUTO DIFF WBC: CPT

## 2019-02-18 ENCOUNTER — APPOINTMENT (OUTPATIENT)
Dept: INFUSION THERAPY | Age: 79
End: 2019-02-18

## 2019-05-07 ENCOUNTER — HOSPITAL ENCOUNTER (OUTPATIENT)
Dept: GENERAL RADIOLOGY | Age: 79
Discharge: HOME | End: 2019-05-07
Payer: MEDICARE

## 2019-05-07 DIAGNOSIS — R91.8 PULMONARY INFILTRATE: ICD-10-CM

## 2019-05-07 PROCEDURE — 71046 X-RAY EXAM CHEST 2 VIEWS: CPT

## 2019-06-03 ENCOUNTER — APPOINTMENT (OUTPATIENT)
Dept: INFUSION THERAPY | Age: 79
End: 2019-06-03

## 2019-06-03 ENCOUNTER — HOSPITAL ENCOUNTER (OUTPATIENT)
Dept: INFUSION THERAPY | Age: 79
End: 2019-06-03

## 2019-06-07 ENCOUNTER — HOSPITAL ENCOUNTER (OUTPATIENT)
Dept: LAB | Age: 79
Discharge: HOME | End: 2019-06-07
Payer: MEDICARE

## 2019-06-07 DIAGNOSIS — E83.110 HEREDITARY HEMOCHROMATOSIS (HCC): ICD-10-CM

## 2019-06-07 DIAGNOSIS — D46.9 MYELODYSPLASTIC SYNDROME (HCC): ICD-10-CM

## 2019-06-07 LAB
ALBUMIN SERPL-MCNC: 4 G/DL (ref 3.2–4.6)
ALBUMIN/GLOB SERPL: 1.5 {RATIO} (ref 1.2–3.5)
ALP SERPL-CCNC: 74 U/L (ref 50–136)
ALT SERPL-CCNC: 23 U/L (ref 12–65)
ANION GAP SERPL CALC-SCNC: 9 MMOL/L (ref 7–16)
AST SERPL-CCNC: 17 U/L (ref 15–37)
BASOPHILS # BLD: 0 K/UL (ref 0–0.2)
BASOPHILS NFR BLD: 0 % (ref 0–2)
BILIRUB SERPL-MCNC: 0.3 MG/DL (ref 0.2–1.1)
BUN SERPL-MCNC: 30 MG/DL (ref 8–23)
CALCIUM SERPL-MCNC: 8.8 MG/DL (ref 8.3–10.4)
CHLORIDE SERPL-SCNC: 105 MMOL/L (ref 98–107)
CO2 SERPL-SCNC: 24 MMOL/L (ref 21–32)
CREAT SERPL-MCNC: 1.19 MG/DL (ref 0.8–1.5)
DIFFERENTIAL METHOD BLD: ABNORMAL
EOSINOPHIL # BLD: 0.1 K/UL (ref 0–0.8)
EOSINOPHIL NFR BLD: 2 % (ref 0.5–7.8)
ERYTHROCYTE [DISTWIDTH] IN BLOOD BY AUTOMATED COUNT: 14.7 % (ref 11.9–14.6)
FERRITIN SERPL-MCNC: 164 NG/ML (ref 8–388)
GLOBULIN SER CALC-MCNC: 2.7 G/DL (ref 2.3–3.5)
GLUCOSE SERPL-MCNC: 125 MG/DL (ref 65–100)
HCT VFR BLD AUTO: 27.5 % (ref 41.1–50.3)
HGB BLD-MCNC: 9.5 G/DL (ref 13.6–17.2)
IMM GRANULOCYTES # BLD AUTO: 0 K/UL (ref 0–0.5)
IMM GRANULOCYTES NFR BLD AUTO: 1 % (ref 0–5)
LYMPHOCYTES # BLD: 0.8 K/UL (ref 0.5–4.6)
LYMPHOCYTES NFR BLD: 13 % (ref 13–44)
MCH RBC QN AUTO: 33.1 PG (ref 26.1–32.9)
MCHC RBC AUTO-ENTMCNC: 34.5 G/DL (ref 31.4–35)
MCV RBC AUTO: 95.8 FL (ref 79.6–97.8)
MONOCYTES # BLD: 0.4 K/UL (ref 0.1–1.3)
MONOCYTES NFR BLD: 7 % (ref 4–12)
NEUTS SEG # BLD: 4.8 K/UL (ref 1.7–8.2)
NEUTS SEG NFR BLD: 77 % (ref 43–78)
NRBC # BLD: 0 K/UL (ref 0–0.2)
PLATELET # BLD AUTO: 155 K/UL (ref 150–450)
PMV BLD AUTO: 10.5 FL (ref 9.4–12.3)
POTASSIUM SERPL-SCNC: 4.1 MMOL/L (ref 3.5–5.1)
PROT SERPL-MCNC: 6.7 G/DL (ref 6.3–8.2)
RBC # BLD AUTO: 2.87 M/UL (ref 4.23–5.67)
SODIUM SERPL-SCNC: 138 MMOL/L (ref 136–145)
WBC # BLD AUTO: 6.1 K/UL (ref 4.3–11.1)

## 2019-06-07 PROCEDURE — 82728 ASSAY OF FERRITIN: CPT

## 2019-06-07 PROCEDURE — 85025 COMPLETE CBC W/AUTO DIFF WBC: CPT

## 2019-06-07 PROCEDURE — 36415 COLL VENOUS BLD VENIPUNCTURE: CPT

## 2019-06-07 PROCEDURE — 80053 COMPREHEN METABOLIC PANEL: CPT

## 2019-11-04 ENCOUNTER — HOSPITAL ENCOUNTER (OUTPATIENT)
Dept: CT IMAGING | Age: 79
Discharge: HOME | End: 2019-11-04
Attending: INTERNAL MEDICINE
Payer: MEDICARE

## 2019-11-04 DIAGNOSIS — R91.8 PULMONARY INFILTRATE: ICD-10-CM

## 2019-11-04 LAB — CREAT BLD-MCNC: 1.1 MG/DL (ref 0.8–1.5)

## 2019-11-04 PROCEDURE — 82565 ASSAY OF CREATININE: CPT

## 2019-11-04 PROCEDURE — 71260 CT THORAX DX C+: CPT

## 2019-11-04 PROCEDURE — 74011000258 HC RX REV CODE- 258: Performed by: INTERNAL MEDICINE

## 2019-11-04 PROCEDURE — 74011636320 HC RX REV CODE- 636/320: Performed by: INTERNAL MEDICINE

## 2019-11-04 RX ORDER — SODIUM CHLORIDE 0.9 % (FLUSH) 0.9 %
10 SYRINGE (ML) INJECTION
Status: COMPLETED | OUTPATIENT
Start: 2019-11-04 | End: 2019-11-04

## 2019-11-04 RX ADMIN — Medication 10 ML: at 11:28

## 2019-11-04 RX ADMIN — IOPAMIDOL 80 ML: 755 INJECTION, SOLUTION INTRAVENOUS at 11:28

## 2019-11-04 RX ADMIN — SODIUM CHLORIDE 100 ML: 900 INJECTION, SOLUTION INTRAVENOUS at 11:28

## 2019-11-08 ENCOUNTER — HOSPITAL ENCOUNTER (OUTPATIENT)
Dept: LAB | Age: 79
Discharge: HOME | End: 2019-11-08
Payer: MEDICARE

## 2019-11-08 DIAGNOSIS — E83.110 HEREDITARY HEMOCHROMATOSIS (HCC): ICD-10-CM

## 2019-11-08 DIAGNOSIS — D46.9 MYELODYSPLASTIC SYNDROME (HCC): ICD-10-CM

## 2019-11-08 LAB
ALBUMIN SERPL-MCNC: 4 G/DL (ref 3.2–4.6)
ALBUMIN/GLOB SERPL: 1.3 {RATIO} (ref 1.2–3.5)
ALP SERPL-CCNC: 87 U/L (ref 50–136)
ALT SERPL-CCNC: 32 U/L (ref 12–65)
ANION GAP SERPL CALC-SCNC: 6 MMOL/L (ref 7–16)
AST SERPL-CCNC: 19 U/L (ref 15–37)
BASOPHILS # BLD: 0 K/UL (ref 0–0.2)
BASOPHILS NFR BLD: 0 % (ref 0–2)
BILIRUB SERPL-MCNC: 0.3 MG/DL (ref 0.2–1.1)
BUN SERPL-MCNC: 29 MG/DL (ref 8–23)
CALCIUM SERPL-MCNC: 8.7 MG/DL (ref 8.3–10.4)
CHLORIDE SERPL-SCNC: 106 MMOL/L (ref 98–107)
CO2 SERPL-SCNC: 26 MMOL/L (ref 21–32)
CREAT SERPL-MCNC: 1.08 MG/DL (ref 0.8–1.5)
DIFFERENTIAL METHOD BLD: ABNORMAL
EOSINOPHIL # BLD: 0.1 K/UL (ref 0–0.8)
EOSINOPHIL NFR BLD: 2 % (ref 0.5–7.8)
ERYTHROCYTE [DISTWIDTH] IN BLOOD BY AUTOMATED COUNT: 14.1 % (ref 11.9–14.6)
FERRITIN SERPL-MCNC: 196 NG/ML (ref 8–388)
GLOBULIN SER CALC-MCNC: 3.1 G/DL (ref 2.3–3.5)
GLUCOSE SERPL-MCNC: 107 MG/DL (ref 65–100)
HCT VFR BLD AUTO: 29.9 % (ref 41.1–50.3)
HGB BLD-MCNC: 9.9 G/DL (ref 13.6–17.2)
HGB RETIC QN AUTO: 38 PG (ref 29–35)
IMM GRANULOCYTES # BLD AUTO: 0 K/UL (ref 0–0.5)
IMM GRANULOCYTES NFR BLD AUTO: 0 % (ref 0–5)
IMM RETICS NFR: 8.1 % (ref 2.3–13.4)
IRON SATN MFR SERPL: 25 %
IRON SERPL-MCNC: 76 UG/DL (ref 35–150)
LYMPHOCYTES # BLD: 0.9 K/UL (ref 0.5–4.6)
LYMPHOCYTES NFR BLD: 16 % (ref 13–44)
MCH RBC QN AUTO: 32.6 PG (ref 26.1–32.9)
MCHC RBC AUTO-ENTMCNC: 33.1 G/DL (ref 31.4–35)
MCV RBC AUTO: 98.4 FL (ref 79.6–97.8)
MONOCYTES # BLD: 0.4 K/UL (ref 0.1–1.3)
MONOCYTES NFR BLD: 8 % (ref 4–12)
NEUTS SEG # BLD: 4.1 K/UL (ref 1.7–8.2)
NEUTS SEG NFR BLD: 74 % (ref 43–78)
NRBC # BLD: 0 K/UL (ref 0–0.2)
PLATELET # BLD AUTO: 179 K/UL (ref 150–450)
PMV BLD AUTO: 10.3 FL (ref 9.4–12.3)
POTASSIUM SERPL-SCNC: 4.4 MMOL/L (ref 3.5–5.1)
PROT SERPL-MCNC: 7.1 G/DL (ref 6.3–8.2)
RBC # BLD AUTO: 3.04 M/UL (ref 4.23–5.67)
RETICS # AUTO: 0.04 M/UL (ref 0.03–0.1)
RETICS/RBC NFR AUTO: 1.4 % (ref 0.3–2)
SODIUM SERPL-SCNC: 138 MMOL/L (ref 136–145)
TIBC SERPL-MCNC: 306 UG/DL (ref 250–450)
WBC # BLD AUTO: 5.5 K/UL (ref 4.3–11.1)

## 2019-11-08 PROCEDURE — 82728 ASSAY OF FERRITIN: CPT

## 2019-11-08 PROCEDURE — 80053 COMPREHEN METABOLIC PANEL: CPT

## 2019-11-08 PROCEDURE — 85046 RETICYTE/HGB CONCENTRATE: CPT

## 2019-11-08 PROCEDURE — 36415 COLL VENOUS BLD VENIPUNCTURE: CPT

## 2019-11-08 PROCEDURE — 83540 ASSAY OF IRON: CPT

## 2019-11-08 PROCEDURE — 85025 COMPLETE CBC W/AUTO DIFF WBC: CPT

## 2020-06-18 ENCOUNTER — HOSPITAL ENCOUNTER (OUTPATIENT)
Dept: LAB | Age: 80
Discharge: HOME | End: 2020-06-18
Payer: MEDICARE

## 2020-06-18 DIAGNOSIS — D46.9 MYELODYSPLASTIC SYNDROME (HCC): ICD-10-CM

## 2020-06-18 LAB
ALBUMIN SERPL-MCNC: 4.2 G/DL (ref 3.2–4.6)
ALBUMIN/GLOB SERPL: 1.5 {RATIO} (ref 1.2–3.5)
ALP SERPL-CCNC: 81 U/L (ref 50–136)
ALT SERPL-CCNC: 24 U/L (ref 12–65)
ANION GAP SERPL CALC-SCNC: 9 MMOL/L (ref 7–16)
AST SERPL-CCNC: 21 U/L (ref 15–37)
BASOPHILS # BLD: 0 K/UL (ref 0–0.2)
BASOPHILS NFR BLD: 0 % (ref 0–2)
BILIRUB SERPL-MCNC: 0.5 MG/DL (ref 0.2–1.1)
BUN SERPL-MCNC: 28 MG/DL (ref 8–23)
CALCIUM SERPL-MCNC: 9 MG/DL (ref 8.3–10.4)
CHLORIDE SERPL-SCNC: 101 MMOL/L (ref 98–107)
CO2 SERPL-SCNC: 24 MMOL/L (ref 21–32)
CREAT SERPL-MCNC: 1.1 MG/DL (ref 0.8–1.5)
DIFFERENTIAL METHOD BLD: ABNORMAL
EOSINOPHIL # BLD: 0.1 K/UL (ref 0–0.8)
EOSINOPHIL NFR BLD: 2 % (ref 0.5–7.8)
ERYTHROCYTE [DISTWIDTH] IN BLOOD BY AUTOMATED COUNT: 14.2 % (ref 11.9–14.6)
FERRITIN SERPL-MCNC: 133 NG/ML (ref 8–388)
GLOBULIN SER CALC-MCNC: 2.8 G/DL (ref 2.3–3.5)
GLUCOSE SERPL-MCNC: 121 MG/DL (ref 65–100)
HCT VFR BLD AUTO: 31.2 % (ref 41.1–50.3)
HGB BLD-MCNC: 10.7 G/DL (ref 13.6–17.2)
HGB RETIC QN AUTO: 38 PG (ref 29–35)
IMM GRANULOCYTES # BLD AUTO: 0 K/UL (ref 0–0.5)
IMM GRANULOCYTES NFR BLD AUTO: 0 % (ref 0–5)
IMM RETICS NFR: 10.3 % (ref 2.3–13.4)
IRON SATN MFR SERPL: 25 %
IRON SERPL-MCNC: 78 UG/DL (ref 35–150)
LYMPHOCYTES # BLD: 1.1 K/UL (ref 0.5–4.6)
LYMPHOCYTES NFR BLD: 17 % (ref 13–44)
MCH RBC QN AUTO: 33.5 PG (ref 26.1–32.9)
MCHC RBC AUTO-ENTMCNC: 34.3 G/DL (ref 31.4–35)
MCV RBC AUTO: 97.8 FL (ref 79.6–97.8)
MONOCYTES # BLD: 0.6 K/UL (ref 0.1–1.3)
MONOCYTES NFR BLD: 9 % (ref 4–12)
NEUTS SEG # BLD: 4.5 K/UL (ref 1.7–8.2)
NEUTS SEG NFR BLD: 71 % (ref 43–78)
NRBC # BLD: 0 K/UL (ref 0–0.2)
PLATELET # BLD AUTO: 143 K/UL (ref 150–450)
PMV BLD AUTO: 10 FL (ref 9.4–12.3)
POTASSIUM SERPL-SCNC: 4.3 MMOL/L (ref 3.5–5.1)
PROT SERPL-MCNC: 7 G/DL (ref 6.3–8.2)
RBC # BLD AUTO: 3.19 M/UL (ref 4.23–5.67)
RETICS # AUTO: 0.04 M/UL (ref 0.03–0.1)
RETICS/RBC NFR AUTO: 1.4 % (ref 0.3–2)
SODIUM SERPL-SCNC: 134 MMOL/L (ref 136–145)
TIBC SERPL-MCNC: 317 UG/DL (ref 250–450)
WBC # BLD AUTO: 6.3 K/UL (ref 4.3–11.1)

## 2020-06-18 PROCEDURE — 85046 RETICYTE/HGB CONCENTRATE: CPT

## 2020-06-18 PROCEDURE — 82728 ASSAY OF FERRITIN: CPT

## 2020-06-18 PROCEDURE — 80053 COMPREHEN METABOLIC PANEL: CPT

## 2020-06-18 PROCEDURE — 36415 COLL VENOUS BLD VENIPUNCTURE: CPT

## 2020-06-18 PROCEDURE — 83540 ASSAY OF IRON: CPT

## 2020-06-18 PROCEDURE — 85025 COMPLETE CBC W/AUTO DIFF WBC: CPT

## 2020-10-22 ENCOUNTER — HOSPITAL ENCOUNTER (OUTPATIENT)
Dept: LAB | Age: 80
Discharge: HOME | End: 2020-10-22
Payer: MEDICARE

## 2020-10-22 DIAGNOSIS — E83.110 HEREDITARY HEMOCHROMATOSIS (HCC): ICD-10-CM

## 2020-10-22 LAB
ALBUMIN SERPL-MCNC: 4 G/DL (ref 3.2–4.6)
ALBUMIN/GLOB SERPL: 1.3 {RATIO} (ref 1.2–3.5)
ALP SERPL-CCNC: 83 U/L (ref 50–136)
ALT SERPL-CCNC: 32 U/L (ref 12–65)
ANION GAP SERPL CALC-SCNC: 5 MMOL/L (ref 7–16)
AST SERPL-CCNC: 25 U/L (ref 15–37)
BASOPHILS # BLD: 0 K/UL (ref 0–0.2)
BASOPHILS NFR BLD: 0 % (ref 0–2)
BILIRUB SERPL-MCNC: 0.4 MG/DL (ref 0.2–1.1)
BUN SERPL-MCNC: 27 MG/DL (ref 8–23)
CALCIUM SERPL-MCNC: 9 MG/DL (ref 8.3–10.4)
CHLORIDE SERPL-SCNC: 103 MMOL/L (ref 98–107)
CO2 SERPL-SCNC: 27 MMOL/L (ref 21–32)
CREAT SERPL-MCNC: 1 MG/DL (ref 0.8–1.5)
DIFFERENTIAL METHOD BLD: ABNORMAL
EOSINOPHIL # BLD: 0.1 K/UL (ref 0–0.8)
EOSINOPHIL NFR BLD: 1 % (ref 0.5–7.8)
ERYTHROCYTE [DISTWIDTH] IN BLOOD BY AUTOMATED COUNT: 14.7 % (ref 11.9–14.6)
FERRITIN SERPL-MCNC: 139 NG/ML (ref 8–388)
GLOBULIN SER CALC-MCNC: 3 G/DL (ref 2.3–3.5)
GLUCOSE SERPL-MCNC: 105 MG/DL (ref 65–100)
HCT VFR BLD AUTO: 30.7 % (ref 41.1–50.3)
HGB BLD-MCNC: 10.3 G/DL (ref 13.6–17.2)
HGB RETIC QN AUTO: 39 PG (ref 29–35)
IMM GRANULOCYTES # BLD AUTO: 0 K/UL (ref 0–0.5)
IMM GRANULOCYTES NFR BLD AUTO: 1 % (ref 0–5)
IMM RETICS NFR: 8.5 % (ref 2.3–13.4)
IRON SATN MFR SERPL: 22 %
IRON SERPL-MCNC: 69 UG/DL (ref 35–150)
LYMPHOCYTES # BLD: 0.9 K/UL (ref 0.5–4.6)
LYMPHOCYTES NFR BLD: 12 % (ref 13–44)
MCH RBC QN AUTO: 33.2 PG (ref 26.1–32.9)
MCHC RBC AUTO-ENTMCNC: 33.6 G/DL (ref 31.4–35)
MCV RBC AUTO: 99 FL (ref 79.6–97.8)
MONOCYTES # BLD: 0.5 K/UL (ref 0.1–1.3)
MONOCYTES NFR BLD: 7 % (ref 4–12)
NEUTS SEG # BLD: 5.7 K/UL (ref 1.7–8.2)
NEUTS SEG NFR BLD: 79 % (ref 43–78)
NRBC # BLD: 0 K/UL (ref 0–0.2)
PLATELET # BLD AUTO: 157 K/UL (ref 150–450)
PMV BLD AUTO: 10.6 FL (ref 9.4–12.3)
POTASSIUM SERPL-SCNC: 4.7 MMOL/L (ref 3.5–5.1)
PROT SERPL-MCNC: 7 G/DL (ref 6.3–8.2)
RBC # BLD AUTO: 3.1 M/UL (ref 4.23–5.67)
RETICS # AUTO: 0.04 M/UL (ref 0.03–0.1)
RETICS/RBC NFR AUTO: 1.3 % (ref 0.3–2)
SODIUM SERPL-SCNC: 135 MMOL/L (ref 136–145)
TIBC SERPL-MCNC: 320 UG/DL (ref 250–450)
WBC # BLD AUTO: 7.2 K/UL (ref 4.3–11.1)

## 2020-10-22 PROCEDURE — 82728 ASSAY OF FERRITIN: CPT

## 2020-10-22 PROCEDURE — 83540 ASSAY OF IRON: CPT

## 2020-10-22 PROCEDURE — 80053 COMPREHEN METABOLIC PANEL: CPT

## 2020-10-22 PROCEDURE — 85046 RETICYTE/HGB CONCENTRATE: CPT

## 2020-10-22 PROCEDURE — 85025 COMPLETE CBC W/AUTO DIFF WBC: CPT

## 2020-10-22 PROCEDURE — 36415 COLL VENOUS BLD VENIPUNCTURE: CPT

## 2021-02-17 PROBLEM — R07.89 CHEST DISCOMFORT: Status: ACTIVE | Noted: 2021-02-17

## 2021-02-24 PROBLEM — I95.1 ORTHOSTATIC HYPOTENSION: Status: ACTIVE | Noted: 2021-02-24

## 2021-03-01 PROBLEM — R00.2 PALPITATIONS: Status: ACTIVE | Noted: 2021-03-01

## 2021-03-03 ENCOUNTER — HOSPITAL ENCOUNTER (OUTPATIENT)
Dept: LAB | Age: 81
Discharge: HOME | End: 2021-03-03
Payer: MEDICARE

## 2021-03-03 DIAGNOSIS — D46.9 MYELODYSPLASTIC SYNDROME (HCC): ICD-10-CM

## 2021-03-03 DIAGNOSIS — E83.110 HEREDITARY HEMOCHROMATOSIS (HCC): ICD-10-CM

## 2021-03-03 LAB
ALBUMIN SERPL-MCNC: 4 G/DL (ref 3.2–4.6)
ALBUMIN/GLOB SERPL: 1.4 {RATIO} (ref 1.2–3.5)
ALP SERPL-CCNC: 86 U/L (ref 50–136)
ALT SERPL-CCNC: 24 U/L (ref 12–65)
ANION GAP SERPL CALC-SCNC: 5 MMOL/L (ref 7–16)
AST SERPL-CCNC: 19 U/L (ref 15–37)
BASOPHILS # BLD: 0 K/UL (ref 0–0.2)
BASOPHILS NFR BLD: 0 % (ref 0–2)
BILIRUB SERPL-MCNC: 0.4 MG/DL (ref 0.2–1.1)
BUN SERPL-MCNC: 32 MG/DL (ref 8–23)
CALCIUM SERPL-MCNC: 8.8 MG/DL (ref 8.3–10.4)
CHLORIDE SERPL-SCNC: 105 MMOL/L (ref 98–107)
CO2 SERPL-SCNC: 26 MMOL/L (ref 21–32)
CREAT SERPL-MCNC: 0.9 MG/DL (ref 0.8–1.5)
DIFFERENTIAL METHOD BLD: ABNORMAL
EOSINOPHIL # BLD: 0.1 K/UL (ref 0–0.8)
EOSINOPHIL NFR BLD: 1 % (ref 0.5–7.8)
ERYTHROCYTE [DISTWIDTH] IN BLOOD BY AUTOMATED COUNT: 14.9 % (ref 11.9–14.6)
FERRITIN SERPL-MCNC: 120 NG/ML (ref 8–388)
GLOBULIN SER CALC-MCNC: 2.8 G/DL (ref 2.3–3.5)
GLUCOSE SERPL-MCNC: 115 MG/DL (ref 65–100)
HCT VFR BLD AUTO: 28.5 % (ref 41.1–50.3)
HGB BLD-MCNC: 9.5 G/DL (ref 13.6–17.2)
HGB RETIC QN AUTO: 38 PG (ref 29–35)
IMM GRANULOCYTES # BLD AUTO: 0 K/UL (ref 0–0.5)
IMM GRANULOCYTES NFR BLD AUTO: 0 % (ref 0–5)
IMM RETICS NFR: 9.5 % (ref 2.3–13.4)
IRON SATN MFR SERPL: 22 %
IRON SERPL-MCNC: 70 UG/DL (ref 35–150)
LYMPHOCYTES # BLD: 1 K/UL (ref 0.5–4.6)
LYMPHOCYTES NFR BLD: 16 % (ref 13–44)
MCH RBC QN AUTO: 32.8 PG (ref 26.1–32.9)
MCHC RBC AUTO-ENTMCNC: 33.3 G/DL (ref 31.4–35)
MCV RBC AUTO: 98.3 FL (ref 79.6–97.8)
MONOCYTES # BLD: 0.5 K/UL (ref 0.1–1.3)
MONOCYTES NFR BLD: 7 % (ref 4–12)
NEUTS SEG # BLD: 4.7 K/UL (ref 1.7–8.2)
NEUTS SEG NFR BLD: 75 % (ref 43–78)
NRBC # BLD: 0 K/UL (ref 0–0.2)
PLATELET # BLD AUTO: 160 K/UL (ref 150–450)
PMV BLD AUTO: 10.4 FL (ref 9.4–12.3)
POTASSIUM SERPL-SCNC: 4.6 MMOL/L (ref 3.5–5.1)
PROT SERPL-MCNC: 6.8 G/DL (ref 6.3–8.2)
RBC # BLD AUTO: 2.9 M/UL (ref 4.23–5.67)
RETICS # AUTO: 0.04 M/UL (ref 0.03–0.1)
RETICS/RBC NFR AUTO: 1.2 % (ref 0.3–2)
SODIUM SERPL-SCNC: 136 MMOL/L (ref 136–145)
TIBC SERPL-MCNC: 321 UG/DL (ref 250–450)
WBC # BLD AUTO: 6.2 K/UL (ref 4.3–11.1)

## 2021-03-03 PROCEDURE — 36415 COLL VENOUS BLD VENIPUNCTURE: CPT

## 2021-03-03 PROCEDURE — 85046 RETICYTE/HGB CONCENTRATE: CPT

## 2021-03-03 PROCEDURE — 82728 ASSAY OF FERRITIN: CPT

## 2021-03-03 PROCEDURE — 80053 COMPREHEN METABOLIC PANEL: CPT

## 2021-03-03 PROCEDURE — 85025 COMPLETE CBC W/AUTO DIFF WBC: CPT

## 2021-03-03 PROCEDURE — 83540 ASSAY OF IRON: CPT

## 2021-04-20 ENCOUNTER — HOSPITAL ENCOUNTER (OUTPATIENT)
Dept: LAB | Age: 81
Discharge: HOME | End: 2021-04-20
Payer: MEDICARE

## 2021-04-20 DIAGNOSIS — D46.9 MYELODYSPLASTIC SYNDROME (HCC): ICD-10-CM

## 2021-04-20 LAB
ABO + RH BLD: NORMAL
ALBUMIN SERPL-MCNC: 3.9 G/DL (ref 3.2–4.6)
ALBUMIN/GLOB SERPL: 1.4 {RATIO} (ref 1.2–3.5)
ALP SERPL-CCNC: 85 U/L (ref 50–136)
ALT SERPL-CCNC: 24 U/L (ref 12–65)
ANION GAP SERPL CALC-SCNC: 7 MMOL/L (ref 7–16)
APTT PPP: 31.8 SEC (ref 24.1–35.1)
AST SERPL-CCNC: 22 U/L (ref 15–37)
BASOPHILS # BLD: 0 K/UL (ref 0–0.2)
BASOPHILS NFR BLD: 0 % (ref 0–2)
BILIRUB SERPL-MCNC: 0.4 MG/DL (ref 0.2–1.1)
BLOOD GROUP ANTIBODIES SERPL: NORMAL
BUN SERPL-MCNC: 36 MG/DL (ref 8–23)
CALCIUM SERPL-MCNC: 9.2 MG/DL (ref 8.3–10.4)
CHLORIDE SERPL-SCNC: 102 MMOL/L (ref 98–107)
CO2 SERPL-SCNC: 25 MMOL/L (ref 21–32)
CREAT SERPL-MCNC: 1 MG/DL (ref 0.8–1.5)
DIFFERENTIAL METHOD BLD: ABNORMAL
EOSINOPHIL # BLD: 0.1 K/UL (ref 0–0.8)
EOSINOPHIL NFR BLD: 1 % (ref 0.5–7.8)
ERYTHROCYTE [DISTWIDTH] IN BLOOD BY AUTOMATED COUNT: 15.3 % (ref 11.9–14.6)
FERRITIN SERPL-MCNC: 134 NG/ML (ref 8–388)
GLOBULIN SER CALC-MCNC: 2.8 G/DL (ref 2.3–3.5)
GLUCOSE SERPL-MCNC: 99 MG/DL (ref 65–100)
HCT VFR BLD AUTO: 27.6 %
HGB BLD-MCNC: 9.4 G/DL (ref 13.6–17.2)
HGB RETIC QN AUTO: 37 PG (ref 29–35)
IMM GRANULOCYTES # BLD AUTO: 0 K/UL (ref 0–0.5)
IMM GRANULOCYTES NFR BLD AUTO: 0 % (ref 0–5)
IMM RETICS NFR: 8.7 % (ref 2.3–13.4)
INR PPP: 1
IRON SATN MFR SERPL: 22 %
IRON SERPL-MCNC: 73 UG/DL (ref 35–150)
LYMPHOCYTES # BLD: 0.9 K/UL (ref 0.5–4.6)
LYMPHOCYTES NFR BLD: 14 % (ref 13–44)
MCH RBC QN AUTO: 33.9 PG (ref 26.1–32.9)
MCHC RBC AUTO-ENTMCNC: 34.1 G/DL (ref 31.4–35)
MCV RBC AUTO: 99.6 FL (ref 79.6–97.8)
MONOCYTES # BLD: 0.5 K/UL (ref 0.1–1.3)
MONOCYTES NFR BLD: 8 % (ref 4–12)
NEUTS SEG # BLD: 4.8 K/UL (ref 1.7–8.2)
NEUTS SEG NFR BLD: 77 % (ref 43–78)
NRBC # BLD: 0 K/UL (ref 0–0.2)
PLATELET # BLD AUTO: 172 K/UL (ref 150–450)
PMV BLD AUTO: 10.5 FL (ref 9.4–12.3)
POTASSIUM SERPL-SCNC: 4.6 MMOL/L (ref 3.5–5.1)
PROT SERPL-MCNC: 6.7 G/DL (ref 6.3–8.2)
PROTHROMBIN TIME: 13.6 SEC (ref 12.5–14.7)
RBC # BLD AUTO: 2.77 M/UL (ref 4.23–5.6)
RETICS # AUTO: 0.05 M/UL (ref 0.03–0.1)
RETICS/RBC NFR AUTO: 1.7 % (ref 0.3–2)
SODIUM SERPL-SCNC: 134 MMOL/L (ref 136–145)
SPECIMEN EXP DATE BLD: NORMAL
TIBC SERPL-MCNC: 325 UG/DL (ref 250–450)
WBC # BLD AUTO: 6.2 K/UL (ref 4.3–11.1)

## 2021-04-20 PROCEDURE — 85046 RETICYTE/HGB CONCENTRATE: CPT

## 2021-04-20 PROCEDURE — 85730 THROMBOPLASTIN TIME PARTIAL: CPT

## 2021-04-20 PROCEDURE — 83540 ASSAY OF IRON: CPT

## 2021-04-20 PROCEDURE — 80053 COMPREHEN METABOLIC PANEL: CPT

## 2021-04-20 PROCEDURE — 85610 PROTHROMBIN TIME: CPT

## 2021-04-20 PROCEDURE — 85025 COMPLETE CBC W/AUTO DIFF WBC: CPT

## 2021-04-20 PROCEDURE — 36415 COLL VENOUS BLD VENIPUNCTURE: CPT

## 2021-04-20 PROCEDURE — 82728 ASSAY OF FERRITIN: CPT

## 2021-04-20 PROCEDURE — 86901 BLOOD TYPING SEROLOGIC RH(D): CPT

## 2021-08-03 ENCOUNTER — HOSPITAL ENCOUNTER (OUTPATIENT)
Dept: LAB | Age: 81
Discharge: HOME | End: 2021-08-03
Payer: MEDICARE

## 2021-08-03 DIAGNOSIS — D46.9 MYELODYSPLASTIC SYNDROME (HCC): ICD-10-CM

## 2021-08-03 LAB
ALBUMIN SERPL-MCNC: 3.9 G/DL (ref 3.2–4.6)
ALBUMIN/GLOB SERPL: 1.3 {RATIO} (ref 1.2–3.5)
ALP SERPL-CCNC: 85 U/L (ref 50–136)
ALT SERPL-CCNC: 26 U/L (ref 12–65)
ANION GAP SERPL CALC-SCNC: 7 MMOL/L (ref 7–16)
AST SERPL-CCNC: 22 U/L (ref 15–37)
BASOPHILS # BLD: 0 K/UL (ref 0–0.2)
BASOPHILS NFR BLD: 0 % (ref 0–2)
BILIRUB SERPL-MCNC: 0.5 MG/DL (ref 0.2–1.1)
BUN SERPL-MCNC: 36 MG/DL (ref 8–23)
CALCIUM SERPL-MCNC: 8.9 MG/DL (ref 8.3–10.4)
CHLORIDE SERPL-SCNC: 105 MMOL/L (ref 98–107)
CO2 SERPL-SCNC: 23 MMOL/L (ref 21–32)
CREAT SERPL-MCNC: 1.2 MG/DL (ref 0.8–1.5)
DIFFERENTIAL METHOD BLD: ABNORMAL
EOSINOPHIL # BLD: 0.1 K/UL (ref 0–0.8)
EOSINOPHIL NFR BLD: 1 % (ref 0.5–7.8)
ERYTHROCYTE [DISTWIDTH] IN BLOOD BY AUTOMATED COUNT: 15.4 % (ref 11.9–14.6)
FERRITIN SERPL-MCNC: 105 NG/ML (ref 8–388)
GLOBULIN SER CALC-MCNC: 3 G/DL (ref 2.3–3.5)
GLUCOSE SERPL-MCNC: 104 MG/DL (ref 65–100)
HCT VFR BLD AUTO: 27.8 %
HGB BLD-MCNC: 9.3 G/DL (ref 13.6–17.2)
IMM GRANULOCYTES # BLD AUTO: 0 K/UL (ref 0–0.5)
IMM GRANULOCYTES NFR BLD AUTO: 0 % (ref 0–5)
IRON SATN MFR SERPL: 22 %
IRON SERPL-MCNC: 72 UG/DL (ref 35–150)
LYMPHOCYTES # BLD: 0.9 K/UL (ref 0.5–4.6)
LYMPHOCYTES NFR BLD: 17 % (ref 13–44)
MCH RBC QN AUTO: 33.5 PG (ref 26.1–32.9)
MCHC RBC AUTO-ENTMCNC: 33.5 G/DL (ref 31.4–35)
MCV RBC AUTO: 100 FL (ref 79.6–97.8)
MONOCYTES # BLD: 0.5 K/UL (ref 0.1–1.3)
MONOCYTES NFR BLD: 9 % (ref 4–12)
NEUTS SEG # BLD: 3.8 K/UL (ref 1.7–8.2)
NEUTS SEG NFR BLD: 73 % (ref 43–78)
NRBC # BLD: 0 K/UL (ref 0–0.2)
PLATELET # BLD AUTO: 148 K/UL (ref 150–450)
PMV BLD AUTO: 10.1 FL (ref 9.4–12.3)
POTASSIUM SERPL-SCNC: 4.7 MMOL/L (ref 3.5–5.1)
PROT SERPL-MCNC: 6.9 G/DL (ref 6.3–8.2)
RBC # BLD AUTO: 2.78 M/UL (ref 4.23–5.6)
SODIUM SERPL-SCNC: 135 MMOL/L (ref 136–145)
TIBC SERPL-MCNC: 333 UG/DL (ref 250–450)
WBC # BLD AUTO: 5.2 K/UL (ref 4.3–11.1)

## 2021-08-03 PROCEDURE — 82728 ASSAY OF FERRITIN: CPT

## 2021-08-03 PROCEDURE — 83540 ASSAY OF IRON: CPT

## 2021-08-03 PROCEDURE — 36415 COLL VENOUS BLD VENIPUNCTURE: CPT

## 2021-08-03 PROCEDURE — 85025 COMPLETE CBC W/AUTO DIFF WBC: CPT

## 2021-08-03 PROCEDURE — 80053 COMPREHEN METABOLIC PANEL: CPT

## 2021-08-23 PROBLEM — D46.9 MYELODYSPLASTIC SYNDROME (HCC): Status: ACTIVE | Noted: 2019-07-18

## 2021-08-23 PROBLEM — M15.9 GENERALIZED OSTEOARTHRITIS: Status: ACTIVE | Noted: 2021-08-23

## 2021-08-23 PROBLEM — N40.1 HYPERTROPHY OF PROSTATE WITH URINARY OBSTRUCTION: Status: ACTIVE | Noted: 2020-10-23

## 2021-08-23 PROBLEM — M51.36 DEGENERATION OF LUMBAR INTERVERTEBRAL DISC: Status: ACTIVE | Noted: 2021-08-23

## 2021-08-23 PROBLEM — N13.8 HYPERTROPHY OF PROSTATE WITH URINARY OBSTRUCTION: Status: ACTIVE | Noted: 2020-10-23

## 2021-08-23 PROBLEM — E66.9 OBESITY, CLASS II, BMI 35-39.9, ISOLATED: Status: ACTIVE | Noted: 2020-01-28

## 2021-11-04 ENCOUNTER — TRANSCRIBE ORDER (OUTPATIENT)
Dept: SCHEDULING | Age: 81
End: 2021-11-04

## 2021-11-04 DIAGNOSIS — M54.2 CERVICAL PAIN (NECK): Primary | ICD-10-CM

## 2021-11-09 ENCOUNTER — HOSPITAL ENCOUNTER (OUTPATIENT)
Dept: MRI IMAGING | Age: 81
Discharge: HOME | End: 2021-11-09
Attending: ORTHOPAEDIC SURGERY
Payer: MEDICARE

## 2021-11-09 DIAGNOSIS — M54.2 CERVICAL PAIN (NECK): ICD-10-CM

## 2021-11-09 PROCEDURE — 72141 MRI NECK SPINE W/O DYE: CPT

## 2021-12-17 ENCOUNTER — HOSPITAL ENCOUNTER (OUTPATIENT)
Dept: LAB | Age: 81
Discharge: HOME | End: 2021-12-17
Payer: MEDICARE

## 2021-12-17 DIAGNOSIS — E83.110 HEREDITARY HEMOCHROMATOSIS (HCC): ICD-10-CM

## 2021-12-17 DIAGNOSIS — D46.9 MYELODYSPLASTIC SYNDROME (HCC): ICD-10-CM

## 2021-12-17 DIAGNOSIS — E83.19 IRON OVERLOAD: ICD-10-CM

## 2021-12-17 LAB
ALBUMIN SERPL-MCNC: 4 G/DL (ref 3.2–4.6)
ALBUMIN/GLOB SERPL: 1.5 {RATIO} (ref 1.2–3.5)
ALP SERPL-CCNC: 88 U/L (ref 50–136)
ALT SERPL-CCNC: 25 U/L (ref 12–65)
ANION GAP SERPL CALC-SCNC: 8 MMOL/L (ref 7–16)
AST SERPL-CCNC: 24 U/L (ref 15–37)
BASOPHILS # BLD: 0 K/UL (ref 0–0.2)
BASOPHILS NFR BLD: 0 % (ref 0–2)
BILIRUB SERPL-MCNC: 0.4 MG/DL (ref 0.2–1.1)
BUN SERPL-MCNC: 35 MG/DL (ref 8–23)
CALCIUM SERPL-MCNC: 8.9 MG/DL (ref 8.3–10.4)
CHLORIDE SERPL-SCNC: 103 MMOL/L (ref 98–107)
CO2 SERPL-SCNC: 25 MMOL/L (ref 21–32)
CREAT SERPL-MCNC: 1.1 MG/DL (ref 0.8–1.5)
DIFFERENTIAL METHOD BLD: ABNORMAL
EOSINOPHIL # BLD: 0.1 K/UL (ref 0–0.8)
EOSINOPHIL NFR BLD: 2 % (ref 0.5–7.8)
ERYTHROCYTE [DISTWIDTH] IN BLOOD BY AUTOMATED COUNT: 15.5 % (ref 11.9–14.6)
FERRITIN SERPL-MCNC: 90 NG/ML (ref 8–388)
GLOBULIN SER CALC-MCNC: 2.7 G/DL (ref 2.3–3.5)
GLUCOSE SERPL-MCNC: 101 MG/DL (ref 65–100)
HCT VFR BLD AUTO: 28 %
HGB BLD-MCNC: 9.2 G/DL (ref 13.6–17.2)
HGB RETIC QN AUTO: 37 PG (ref 29–35)
IMM GRANULOCYTES # BLD AUTO: 0 K/UL (ref 0–0.5)
IMM GRANULOCYTES NFR BLD AUTO: 0 % (ref 0–5)
IMM RETICS NFR: 7.8 % (ref 2.3–13.4)
IRON SATN MFR SERPL: 25 %
IRON SERPL-MCNC: 85 UG/DL (ref 35–150)
LYMPHOCYTES # BLD: 0.8 K/UL (ref 0.5–4.6)
LYMPHOCYTES NFR BLD: 17 % (ref 13–44)
MCH RBC QN AUTO: 32.5 PG (ref 26.1–32.9)
MCHC RBC AUTO-ENTMCNC: 32.9 G/DL (ref 31.4–35)
MCV RBC AUTO: 98.9 FL (ref 79.6–97.8)
MONOCYTES # BLD: 0.4 K/UL (ref 0.1–1.3)
MONOCYTES NFR BLD: 8 % (ref 4–12)
NEUTS SEG # BLD: 3.7 K/UL (ref 1.7–8.2)
NEUTS SEG NFR BLD: 74 % (ref 43–78)
NRBC # BLD: 0 K/UL (ref 0–0.2)
PLATELET # BLD AUTO: 133 K/UL (ref 150–450)
PMV BLD AUTO: 9.9 FL (ref 9.4–12.3)
POTASSIUM SERPL-SCNC: 4.5 MMOL/L (ref 3.5–5.1)
PROT SERPL-MCNC: 6.7 G/DL (ref 6.3–8.2)
RBC # BLD AUTO: 2.83 M/UL (ref 4.23–5.6)
RETICS # AUTO: 0.04 M/UL (ref 0.03–0.1)
RETICS/RBC NFR AUTO: 1.5 % (ref 0.3–2)
SODIUM SERPL-SCNC: 136 MMOL/L (ref 136–145)
TIBC SERPL-MCNC: 335 UG/DL (ref 250–450)
WBC # BLD AUTO: 5 K/UL (ref 4.3–11.1)

## 2021-12-17 PROCEDURE — 83540 ASSAY OF IRON: CPT

## 2021-12-17 PROCEDURE — 82728 ASSAY OF FERRITIN: CPT

## 2021-12-17 PROCEDURE — 85046 RETICYTE/HGB CONCENTRATE: CPT

## 2021-12-17 PROCEDURE — 80053 COMPREHEN METABOLIC PANEL: CPT

## 2021-12-17 PROCEDURE — 36415 COLL VENOUS BLD VENIPUNCTURE: CPT

## 2021-12-17 PROCEDURE — 85025 COMPLETE CBC W/AUTO DIFF WBC: CPT

## 2022-03-18 PROBLEM — N13.8 HYPERTROPHY OF PROSTATE WITH URINARY OBSTRUCTION: Status: ACTIVE | Noted: 2020-10-23

## 2022-03-18 PROBLEM — N40.1 HYPERTROPHY OF PROSTATE WITH URINARY OBSTRUCTION: Status: ACTIVE | Noted: 2020-10-23

## 2022-03-19 PROBLEM — I95.1 ORTHOSTATIC HYPOTENSION: Status: ACTIVE | Noted: 2021-02-24

## 2022-03-19 PROBLEM — Z98.890 S/P EXPLORATORY LAPAROTOMY: Status: ACTIVE | Noted: 2018-12-13

## 2022-03-19 PROBLEM — M51.369 DEGENERATION OF LUMBAR INTERVERTEBRAL DISC: Status: ACTIVE | Noted: 2021-08-23

## 2022-03-19 PROBLEM — D46.9 MYELODYSPLASTIC SYNDROME (HCC): Status: ACTIVE | Noted: 2019-07-18

## 2022-03-19 PROBLEM — R00.2 PALPITATIONS: Status: ACTIVE | Noted: 2021-03-01

## 2022-03-19 PROBLEM — K56.609 SMALL BOWEL OBSTRUCTION (HCC): Status: ACTIVE | Noted: 2018-12-02

## 2022-03-19 PROBLEM — K56.609 SBO (SMALL BOWEL OBSTRUCTION) (HCC): Status: ACTIVE | Noted: 2018-12-02

## 2022-03-19 PROBLEM — M15.9 GENERALIZED OSTEOARTHRITIS: Status: ACTIVE | Noted: 2021-08-23

## 2022-03-19 PROBLEM — M51.36 DEGENERATION OF LUMBAR INTERVERTEBRAL DISC: Status: ACTIVE | Noted: 2021-08-23

## 2022-03-20 PROBLEM — R07.89 CHEST DISCOMFORT: Status: ACTIVE | Noted: 2021-02-17

## 2022-03-20 PROBLEM — E66.812 OBESITY, CLASS II, BMI 35-39.9, ISOLATED: Status: ACTIVE | Noted: 2020-01-28

## 2022-03-20 PROBLEM — E66.9 OBESITY, CLASS II, BMI 35-39.9, ISOLATED: Status: ACTIVE | Noted: 2020-01-28

## 2022-06-14 DIAGNOSIS — E83.19 IRON OVERLOAD: Primary | ICD-10-CM

## 2022-06-16 ENCOUNTER — OFFICE VISIT (OUTPATIENT)
Dept: ONCOLOGY | Age: 82
End: 2022-06-16
Payer: MEDICARE

## 2022-06-16 ENCOUNTER — HOSPITAL ENCOUNTER (OUTPATIENT)
Dept: LAB | Age: 82
Discharge: HOME | End: 2022-06-19
Payer: MEDICARE

## 2022-06-16 VITALS
HEIGHT: 60 IN | SYSTOLIC BLOOD PRESSURE: 136 MMHG | TEMPERATURE: 98 F | BODY MASS INDEX: 30.18 KG/M2 | DIASTOLIC BLOOD PRESSURE: 55 MMHG | HEART RATE: 71 BPM | OXYGEN SATURATION: 97 % | WEIGHT: 153.7 LBS | RESPIRATION RATE: 20 BRPM

## 2022-06-16 DIAGNOSIS — R53.83 FATIGUE, UNSPECIFIED TYPE: ICD-10-CM

## 2022-06-16 DIAGNOSIS — E83.19 IRON OVERLOAD: ICD-10-CM

## 2022-06-16 DIAGNOSIS — D46.9 MYELODYSPLASTIC SYNDROME (HCC): Primary | ICD-10-CM

## 2022-06-16 LAB
ALBUMIN SERPL-MCNC: 3.9 G/DL (ref 3.2–4.6)
ALBUMIN/GLOB SERPL: 1.5 {RATIO} (ref 1.2–3.5)
ALP SERPL-CCNC: 71 U/L (ref 50–136)
ALT SERPL-CCNC: 25 U/L (ref 12–65)
ANION GAP SERPL CALC-SCNC: 6 MMOL/L (ref 7–16)
AST SERPL-CCNC: 22 U/L (ref 15–37)
BASOPHILS # BLD: 0 K/UL (ref 0–0.2)
BASOPHILS NFR BLD: 0 % (ref 0–2)
BILIRUB SERPL-MCNC: 0.4 MG/DL (ref 0.2–1.1)
BUN SERPL-MCNC: 31 MG/DL (ref 8–23)
CALCIUM SERPL-MCNC: 8.9 MG/DL (ref 8.3–10.4)
CHLORIDE SERPL-SCNC: 103 MMOL/L (ref 98–107)
CO2 SERPL-SCNC: 27 MMOL/L (ref 21–32)
CREAT SERPL-MCNC: 0.9 MG/DL (ref 0.8–1.5)
DIFFERENTIAL METHOD BLD: ABNORMAL
EOSINOPHIL # BLD: 0.1 K/UL (ref 0–0.8)
EOSINOPHIL NFR BLD: 1 % (ref 0.5–7.8)
ERYTHROCYTE [DISTWIDTH] IN BLOOD BY AUTOMATED COUNT: 15.6 % (ref 11.9–14.6)
FERRITIN SERPL-MCNC: 102 NG/ML (ref 8–388)
GLOBULIN SER CALC-MCNC: 2.6 G/DL (ref 2.3–3.5)
GLUCOSE SERPL-MCNC: 104 MG/DL (ref 65–100)
HCT VFR BLD AUTO: 27.3 %
HGB BLD-MCNC: 9.3 G/DL (ref 13.6–17.2)
HGB RETIC QN AUTO: 37 PG (ref 29–35)
IMM GRANULOCYTES # BLD AUTO: 0 K/UL (ref 0–0.5)
IMM GRANULOCYTES NFR BLD AUTO: 0 % (ref 0–5)
IMM RETICS NFR: 7.3 % (ref 2.3–13.4)
IRON SATN MFR SERPL: 25 %
IRON SERPL-MCNC: 76 UG/DL (ref 35–150)
LYMPHOCYTES # BLD: 1 K/UL (ref 0.5–4.6)
LYMPHOCYTES NFR BLD: 18 % (ref 13–44)
MCH RBC QN AUTO: 33.9 PG (ref 26.1–32.9)
MCHC RBC AUTO-ENTMCNC: 34.1 G/DL (ref 31.4–35)
MCV RBC AUTO: 99.6 FL (ref 79.6–97.8)
MONOCYTES # BLD: 0.4 K/UL (ref 0.1–1.3)
MONOCYTES NFR BLD: 8 % (ref 4–12)
NEUTS SEG # BLD: 3.8 K/UL (ref 1.7–8.2)
NEUTS SEG NFR BLD: 73 % (ref 43–78)
NRBC # BLD: 0 K/UL (ref 0–0.2)
PLATELET # BLD AUTO: 144 K/UL (ref 150–450)
PMV BLD AUTO: 10.6 FL (ref 9.4–12.3)
POTASSIUM SERPL-SCNC: 4.6 MMOL/L (ref 3.5–5.1)
PROT SERPL-MCNC: 6.5 G/DL (ref 6.3–8.2)
RBC # BLD AUTO: 2.74 M/UL (ref 4.23–5.6)
RETICS # AUTO: 0.04 M/UL (ref 0.03–0.1)
RETICS/RBC NFR AUTO: 1.3 % (ref 0.3–2)
SODIUM SERPL-SCNC: 136 MMOL/L (ref 136–145)
TIBC SERPL-MCNC: 300 UG/DL (ref 250–450)
WBC # BLD AUTO: 5.3 K/UL (ref 4.3–11.1)

## 2022-06-16 PROCEDURE — 83540 ASSAY OF IRON: CPT

## 2022-06-16 PROCEDURE — 1123F ACP DISCUSS/DSCN MKR DOCD: CPT | Performed by: NURSE PRACTITIONER

## 2022-06-16 PROCEDURE — G8417 CALC BMI ABV UP PARAM F/U: HCPCS | Performed by: NURSE PRACTITIONER

## 2022-06-16 PROCEDURE — G8427 DOCREV CUR MEDS BY ELIG CLIN: HCPCS | Performed by: NURSE PRACTITIONER

## 2022-06-16 PROCEDURE — 80053 COMPREHEN METABOLIC PANEL: CPT

## 2022-06-16 PROCEDURE — 85046 RETICYTE/HGB CONCENTRATE: CPT

## 2022-06-16 PROCEDURE — 85025 COMPLETE CBC W/AUTO DIFF WBC: CPT

## 2022-06-16 PROCEDURE — 1036F TOBACCO NON-USER: CPT | Performed by: NURSE PRACTITIONER

## 2022-06-16 PROCEDURE — 82728 ASSAY OF FERRITIN: CPT

## 2022-06-16 PROCEDURE — 36415 COLL VENOUS BLD VENIPUNCTURE: CPT

## 2022-06-16 PROCEDURE — 99214 OFFICE O/P EST MOD 30 MIN: CPT | Performed by: NURSE PRACTITIONER

## 2022-06-16 ASSESSMENT — PATIENT HEALTH QUESTIONNAIRE - PHQ9
SUM OF ALL RESPONSES TO PHQ QUESTIONS 1-9: 0
SUM OF ALL RESPONSES TO PHQ QUESTIONS 1-9: 0
2. FEELING DOWN, DEPRESSED OR HOPELESS: 0
1. LITTLE INTEREST OR PLEASURE IN DOING THINGS: 0
SUM OF ALL RESPONSES TO PHQ QUESTIONS 1-9: 0
SUM OF ALL RESPONSES TO PHQ9 QUESTIONS 1 & 2: 0
SUM OF ALL RESPONSES TO PHQ QUESTIONS 1-9: 0

## 2022-06-16 NOTE — PROGRESS NOTES
Data Source: Patient,  [facility] record. 6/16/22        Lawson Holbrook [id number]      80 y.o. Patient Encounter: [address] Visit      Heme Diagnosis:   MDS   TREVOR   CKD - on intermittent procrit in past.       Heme History (Copied from prior):    Recurrent mild iron deficiency anemia takes oral iron but now corrected ample iron stores and residual anemia due to renal failure occasional procrit shot see monthly for     Problem List   Date Reviewed: 5/7/2013             Codes  Class  Noted       Anemia of renal disease  285.21    12/20/2010       Overview       Addendum 9/6/2013 10:12 AM by Davon Gongora MD       Low epo levels on procrit response     9-06-13 no need for procrit for some time hemoglobin 10 .4 check monthly see 6 monthly                 Iron deficiency  280.9    11/19/2010       Overview       Addendum 9/6/2013 10:11 AM by Davon Gongora MD       Supportive Care Flowsheet  11/23/2010 11/23/2010     Day, Cycle  Day 1, Cycle 1       ferumoxytol (FERAHEME) IV         iron dextran complex 50 mg/mL (INFED) IV  [ 25 mg ]  1,000 mg             Supportive Care Flowsheet  5/28/2012 6/4/2012     Day, Cycle  Day 1, Cycle 1  Day 8, Cycle 1     ferumoxytol (FERAHEME) IV  510 mg  510 mg     iron dextran complex 50 mg/mL (INFED) IV            5-2012 repeated     11-07-12 low iron stores regular follow up transferrin saturation  And replacement   9-06-13 iron stores still good  Results for Kim Smith () as of 9/6/2013 10:09     9/6/2013      Iron  76     TIBC  262     Transferrin Saturation  29     Ferritin  1203 (H)            Thoracic myelopathy  721.41  Present on Admission  1/29/2013 12/14: BM biopsy w/ RARS low risk MDS . Repeat chromium and cobalt levels pending. No new complaints. 02/15: doing well. Hgb returning to normal .Chromium levels self improving, and he regularly follows w/ ortho for this. Zinc and copper normal. Vit D deficient. Diagnosis Date Noted    No Resolved Ambulatory Problems     Past Medical History:   Diagnosis Date    Glaucoma     History of small bowel obstruction 2018    Hypercholesterolemia     Macular degeneration     Peripheral neuropathy 2010    RLS (restless legs syndrome)     Sleep apnea     Spondylolisthesis, acquired 2010     Past Surgical History:   Procedure Laterality Date    CARPAL TUNNEL RELEASE Right 2014    CATARACT REMOVAL Bilateral 2017    CERVICAL DISCECTOMY  2002    CERVICAL FUSION   &     cervical plate placement     CHOLECYSTECTOMY  14    COLONOSCOPY      LUMBAR LAMINECTOMY  9/2/10    lumbar laminectomy L4 x 2/ rods    NEUROLOGICAL SURGERY      T10-T12 fusion    ORTHOPEDIC SURGERY Right     ankle fusion with hardware     ORTHOPEDIC SURGERY Left 2011    fusion of bones in foot    ORTHOPEDIC SURGERY  2013    rods in back     THORACIC LAMINECTOMY  09    T12, L2,L3    TOTAL HIP ARTHROPLASTY Bilateral     R in , l in    [address] Right 2009    TOTAL KNEE ARTHROPLASTY Left 2003     Family History   Problem Relation Age of Onset    Cancer Mother         hx of lymph node    Osteoarthritis Brother     Lung Disease Brother         COPD    Cancer Father         prostate    Heart Disease Mother         carotid enarterectomy     Social History     Socioeconomic History    Marital status:      Spouse name: Not on file    Number of children: Not on file    Years of education: Not on file    Highest education level: Not on file   Occupational History    Not on file   Tobacco Use    Smoking status: Former Smoker     Packs/day: 1.00     Quit date: 1967     Years since quittin.4    Smokeless tobacco: Never Used   Substance and Sexual Activity    Alcohol use:  Yes     Alcohol/week: 11.7 standard drinks    Drug use: No     Types: Prescription    Sexual activity: Not on file   Other Topics Concern    Not on file   Social History Narrative    Not on file     Social Determinants of Health     Financial Resource Strain:     Difficulty of Paying Living Expenses: Not on file   Food Insecurity:     Worried About Running Out of Food in the Last Year: Not on file    Tye of Food in the Last Year: Not on file   Transportation Needs:     Lack of Transportation (Medical): Not on file    Lack of Transportation (Non-Medical):  Not on file   Physical Activity:     Days of Exercise per Week: Not on file    Minutes of Exercise per Session: Not on file   Stress:     Feeling of Stress : Not on file   Social Connections:     Frequency of Communication with Friends and Family: Not on file    Frequency of Social Gatherings with Friends and Family: Not on file    Attends Mandaen Services: Not on file    Active Member of [address] [place] [address] or Organizations: Not on file    Attends Club or Organization Meetings: Not on file    Marital Status: Not on file   Intimate Partner Violence:     Fear of Current or Ex-Partner: Not on file    Emotionally Abused: Not on file    Physically Abused: Not on file    Sexually Abused: Not on file   Housing Stability:     Unable to Pay for Housing in the Last Year: Not on file    Number of [place] in the Last Year: Not on file    Unstable Housing in the Last Year: Not on file     Current Outpatient Medications   Medication Sig Dispense Refill    aspirin 81 MG EC tablet Take by mouth daily      vitamin D (CHOLECALCIFEROL) 25 MCG (1000 UT) TABS tablet Take 1,000 Units by mouth daily      finasteride (PROSCAR) 5 MG tablet Take 5 mg by mouth daily      folic acid (FOLVITE) 1 MG tablet Take by mouth daily      gabapentin (NEURONTIN) 400 MG capsule 1 po tid      latanoprost (XALATAN) 0.005 % ophthalmic solution Apply 1 drop to eye      lisinopril-hydroCHLOROthiazide (PRINZIDE;ZESTORETIC) 20-25 MG per tablet 1 po qd      meloxicam (MOBIC) 15 MG tablet Take 15 mg by mouth daily      mirabegron (MYRBETRIQ) 25 MG TB24 Take 25 mg by mouth daily      pramipexole (MIRAPEX) 0.5 MG tablet 1 po 5 times daily      simvastatin (ZOCOR) 20 MG tablet 1 po qd      tamsulosin (FLOMAX) 0.4 MG capsule Take 0.4 mg by mouth daily      triamcinolone (KENALOG) 0.1 % cream Apply topically 2 times daily as needed       No current facility-administered medications for this visit. Allergies   Allergen Reactions    Metronidazole Other (See Comments) and Rash     Pt states burning sensation all over body. Other reaction(s): Unknown  Pt states burning sensation all over body.  Azithromycin Other (See Comments)     Severe headache    Erythromycin Other (See Comments)     Severe headache    Oxycodone Rash     Other reaction(s): Unknown    Penicillins Rash    Quinolones Rash     Burning sensation all over body      Tramadol Rash     Other reaction(s): Unknown         PHYSICAL EXAMINATION:   General Appearance: Healthy appearing patient in no acute distress. Vitals reviewed. Visit Vitals  Vitals:    06/16/22 1329   BP: (!) 136/55   Site: Right Upper Arm   Position: Sitting   Cuff Size: Medium Adult   Pulse: 71   Resp: 20   Temp: 98 °F (36.7 °C)   TempSrc: Oral   SpO2: 97%   Weight: 153 lb 11.2 oz (69.7 kg)   Height: 4' 11\" (1.499 m)         HEENT: No oral or pharyngeal masses, ulceration or thrush noted. Neck is supple. Lymph Nodes: No lymphadenopathy noted in the occipital, pre and post auricular, cervical, supra and infraclavicular, axillary region. Lungs/Thorax: Clear to auscultation, no accessory muscles of respiration being used. Heart: Regular rate and rhythm, normal S1, S2, no appreciable murmurs, rubs, gallops  Abdomen: Soft, nontender, bowel sounds present, no appreciable hepatosplenomegaly, no palpable masses  Extremeties: Good pulses bilaterally, trace peripheral edema in LE bilaterally.   Skin: Normal skin tone with no rash, petechiae, ecchymosis noted.  Musculoskeletal: No pain on palpation over bony prominence, no evidence of gout, no joint or bony deformity. Neurologic: Grossly intact.             LABS/IMAGING:     Hospital Outpatient Visit on 06/16/2022   Component Date Value Ref Range Status    WBC 06/16/2022 5.3  4.3 - 11.1 K/uL Final    RBC 06/16/2022 2.74* 4.23 - 5.6 M/uL Final    Hemoglobin 06/16/2022 9.3* 13.6 - 17.2 g/dL Final    Hematocrit 06/16/2022 27.3  % Final    MCV 06/16/2022 99.6* 79.6 - 97.8 FL Final    MCH 06/16/2022 33.9* 26.1 - 32.9 PG Final    MCHC 06/16/2022 34.1  31.4 - 35.0 g/dL Final    RDW 06/16/2022 15.6* 11.9 - 14.6 % Final    Platelets 53/80/5533 144* 150 - 450 K/uL Final    MPV 06/16/2022 10.6  9.4 - 12.3 FL Final    nRBC 06/16/2022 0.00  0.0 - 0.2 K/uL Final    **Note: Absolute NRBC parameter is now reported with Hemogram**    Seg Neutrophils 06/16/2022 73  43 - 78 % Final    Lymphocytes 06/16/2022 18  13 - 44 % Final    Monocytes 06/16/2022 8  4.0 - 12.0 % Final    Eosinophils % 06/16/2022 1  0.5 - 7.8 % Final    Basophils 06/16/2022 0  0.0 - 2.0 % Final    Immature Granulocytes 06/16/2022 0  0.0 - 5.0 % Final    Segs Absolute 06/16/2022 3.8  1.7 - 8.2 K/UL Final    Absolute Lymph # 06/16/2022 1.0  0.5 - 4.6 K/UL Final    Absolute Mono # 06/16/2022 0.4  0.1 - 1.3 K/UL Final    Absolute Eos # 06/16/2022 0.1  0.0 - 0.8 K/UL Final    Basophils Absolute 06/16/2022 0.0  0.0 - 0.2 K/UL Final    Absolute Immature Granulocyte 06/16/2022 0.0  0.0 - 0.5 K/UL Final    Differential Type 06/16/2022 AUTOMATED    Final    Sodium 06/16/2022 136  136 - 145 mmol/L Final    Potassium 06/16/2022 4.6  3.5 - 5.1 mmol/L Final    Chloride 06/16/2022 103  98 - 107 mmol/L Final    CO2 06/16/2022 27  21 - 32 mmol/L Final    Anion Gap 06/16/2022 6* 7 - 16 mmol/L Final    Glucose 06/16/2022 104* 65 - 100 mg/dL Final    BUN 06/16/2022 31* 8 - 23 MG/DL Final    CREATININE 06/16/2022 0.90  0.8 - 1.5 MG/DL Final    GFR  06/16/2022 >60  >60 ml/min/1.73m2 Final    GFR Non- 06/16/2022 >60  >60 ml/min/1.73m2 Final    Comment:      Estimated GFR is calculated using the Modification of Diet in Renal Disease (MDRD) Study equation, reported for both  Americans (GFRAA) and non- Americans (GFRNA), and normalized to 1.73m2 body surface area. The physician must decide which value applies to the patient. The MDRD study equation should only be used in individuals age 25 or older. It has not been validated for the following: pregnant women, patients with serious comorbid conditions,or on certain medications, or persons with extremes of body size, muscle mass, or nutritional status.  Calcium 06/16/2022 8.9  8.3 - 10.4 MG/DL Final    Total Bilirubin 06/16/2022 0.4  0.2 - 1.1 MG/DL Final    ALT 06/16/2022 25  12 - 65 U/L Final    AST 06/16/2022 22  15 - 37 U/L Final    Alk Phosphatase 06/16/2022 71  50 - 136 U/L Final    Total Protein 06/16/2022 6.5  6.3 - 8.2 g/dL Final    Albumin 06/16/2022 3.9  3.2 - 4.6 g/dL Final    Globulin 06/16/2022 2.6  2.3 - 3.5 g/dL Final    Albumin/Globulin Ratio 06/16/2022 1.5  1.2 - 3.5   Final    Ferritin 06/16/2022 102  8 - 388 NG/ML Final    Iron 06/16/2022 76  35 - 150 ug/dL Final    Comment: Known Interfering Substances section:  \"Iron values may be falsely elevated in  serum samples from patients with  anticoagulants (e.g., hemodialysis patients). \"  Limitations of Procedure section:  \"Turbidity resulting from precipitation of  fibrinogen in the serum of patients treated  with anticoagulants (e.g. hemodialysis  patients) may cause spuriously elevated  iron results. \"      TIBC 06/16/2022 300  250 - 450 ug/dL Final    TRANSFERRIN SATURATION 06/16/2022 25  >20 % Final    Reticulocyte Count,Automated 06/16/2022 1.3  0.3 - 2.0 % Final    Absolute Retic # 06/16/2022 0.0353  0.026 - 0.095 M/ul Final    Immature Retic Fraction 06/16/2022 7.3  2.3 - 13.4 % Final    Retic Hemoglobin conc. 06/16/2022 37* 29 - 35 pg Final           BM biopsy 03/17:                 Above results reviewed with patient. ASSESSMENT:   1. MDS: (RARS, low risk IPSS score - case d/w [employer] & cytogenetics is felt to be in good risk category)   2. Anemia:  multifactorial including MDS, renal disease (which has improved), chronic disease may also be playing a role as ESR was high. 3. 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 No malignancy on biopsies. F/u per GI          - Start Vit D replacement. Hgb slowly returning to baseline (improved compared to before). Copper and Zinc normal. Chromium levels dropping.    - Travelling to [facility] 09/15. Hgb improved. Off iron since last visit as ferritin was high.       8/20/15: No new complaints. Taking Vit D regularly. Hgb stable. Cobalt levels improved compared to prior. Ferritin high but trending downwards. Hemochromatosis  gene pending. 12/31/15: No new complaints. Hgb more or less stable. Ferritin stable to somewhat better. Hemochromatosis gene analysis showing heterozygosity for H63D.    6/29/16: Counts including hgb stable. Ferritin remains high: will get liver MRI indication assess for iron overload, as well as start once a month phlebotomy to bring ferritin down  slowly. 7/29/16: MRI liver consistent w iron overload: continue w monthly phelbotomies (goal ferritin below 100, hold for hgb below 10). Notes taking  centrum silver w iron in it which he only just realised and has now stopped. 10/31/16: labs w stable hgb, has only been able to tolerate therapy every other month. Ferritin dropping.    3/23/17: Clinically well. Ferritin in 700 range. No phlebotomies since last visit. Repeat BM biopsy read as RARS w increased iron stores,  rare blasts. Cytogenetics & FISH pending.  Continue phlebotomy every other month.    07/17: Doing well. Phlebotomy x 1 since last visit. Ferritin nicely dropping. Hgb stable. BM w complex cytogenetics, FISH w 20q(del). 11/17: No new complaints. Labs stable. Ferritin falling, hgb stable. 03/18: Doing well. Exam and labs ok. Ferritin stable in 200 range. Continue every other month phlebotomy. 06/18: Reports doing well. Has seen primary care and ortho for RT groin discomfort with w/u -ve to-date. Labs stable. Hgb around 10, did not recieve phlebotomy last time related to holding parameters. Continue current care. Requesting to check cobalt/chromium. May consider Exjade if iron stores start rising and he cannot tolerate phlebotomies. CBC 2 months. RTC in 4 months w labs, Liver MRI (assess iron status). Planning cruise to [place]. 10/18: Reports recent hospitalization for bowel obstruction which was conservatively managed. Feels somewhat bloated at times but otherwise moving bowels. Reports no clear etiology of bowel obstruction. Seen earlier than scheduled as hgb on last check  (after hospitalization) had dropped down to 8.3 however hgb today now improved to 9.1. Continue simple monitoring. Ferritin in 200-300 range. On last check his cobalt/chromium levels were elevated and he is told to discuss results w his ortho.    02/19: Denies new complaints. He was recently admitted 12/18 for small bowel obstruction felt related to incarcerated hernia which was laparoscopically repaired. Patient has since recovered well. His hemoglobin is slowly improving to 9.9 today. Abdominal MRI from 10/18 showed normal liver iron stores. Of note incidental infiltrates picked up in both lung bases. Patient however today denies any pulmonary symptoms and as such we will simply  follow with a chest x-ray with his next visit (patient reports smoking for less than 10 years and quit in the 1960s). 06/19: Reports doing well. Denies any new complaints.   Exam unremarkable, no significant adenopathy. Labs with stable anemia with hemoglobin at 9.5. Iron stores are acceptable at 164. As such continue simple monitoring, will hold off on further phlebotomies  also. Chest x-ray unremarkable. Discussed given minimal smoking history options to pursue further the findings on his lung bases from his prior MRI with a CAT scan versus simple monitoring  given patient is completely asymptomatic. Patient would prefer the latter approach of simple monitoring going forward. RTC in 4 months with labs, ferritin levels. 11/8/19: Denies any new complaints. Hemoglobin stable. No other cytopenias. Ferritin acceptable range. Will continue to hold off further phlebotomies given hemoglobin is borderline. Recent  CT chest with resolution of bibasilar infiltrates, patient reassured. 10/22/2020: Reports some RUQ discomfort, worse with dinner, for over a month or so. Advised to follow-up with primary care for this. Weight about the same. Remains on vitamin D replacement as well as multiple supplements. Advised to make sure they  do not have cobalt/chromium or supplemental iron. Recent routine labs with stable counts, hemoglobin at 10.3. Adequate WBC and platelets. Ferritin at 139, reasonable, given anemia will hold off on further phlebotomy for now. As such continue simple  monitoring. He should continue with his vitamin D 1000 mg daily also. RTC in 4 months with labs, iron stores. 3/4/2021: Patient seen over telemedicine. Denies any new complaints. Recently underwent COVID-19 vaccinations. Remains on vitamin D supplementation. Recent labs with hemoglobin 9.5, mostly stable though over the years with a slow trend downwards. Other cell lines unremarkable. Ferritin acceptable at 120. Continue ongoing monitoring. 8/3/2021: Reports doing well. Some fatigue which she attributes to getting older. Denies any bleeding.   Blood work with hemoglobin mostly stable at 9.3, other cell lines unremarkable. Ferritin reasonable at 105. Continue simple monitoring. Last bone  marrow biopsy was in 2017, will consider repeat in the coming year per patient preference. 12/17/2021: Reports doing reasonably. Some fatigue, and leg weakness, working on mobility at home. Regularly eats various berries for his breakfast to maintain good diet. Blood work today with hemoglobin and platelet count stable at 9.2 and 1 33,000 respectively  however over the years slow trend downwards. Ferritin reasonable in the 90s. Discussed continued monitoring, patient regularly follows with primary care and other specialists, his preference is to minimize visits, we will see him back in 6 months moving  forward, understands to reach out to us should there be a significant change in his blood count. 6/16/22: He is here today for 6 month follow up. He continues to do okay overall. Mild fatigue manageable. He denies any issues with appetite. He denies any fevers, night sweats, or lymphadenopathy. No bleeding. No infectious symptoms. He denies any GI symptoms. Labs reviewed and stable today along all cell lines. Ferritin reasonable right at 100. Discussed continued monitoring every 6 months unless something changes. He will continue follow up with his PCP and other specialists. ICD-10-CM    1. Myelodysplastic syndrome [facility]  D46.9    2. Fatigue, unspecified type  R53.83          PLAN:   - MDS: Counts stable. Monitoring alone for now. - H63D heterozygous Hemachromatosis: Ferritin borderline high: continue phlebotomies as tolerated w each visit (goal ferritin close to 100, hold for hgb below 10.5). Educated on foods  w high iron level. May consider Exjade if iron stores start rising and he cannot tolerate phlebotomies. Being monitored now. 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 Ortho following. RTC in 6 months with labs, ESR, iron panel.  Consider repeat BM biopsy every 2-3 years (or if counts start dropping).          Chaz Bhatia, [address] [facility]   [address] [facility]   [address] [facility]   Office : [phone]   Fax : [phone]

## 2022-07-25 ENCOUNTER — OFFICE VISIT (OUTPATIENT)
Dept: ORTHOPEDIC SURGERY | Age: 82
End: 2022-07-25
Payer: MEDICARE

## 2022-07-25 VITALS — HEIGHT: 60 IN | WEIGHT: 165 LBS | BODY MASS INDEX: 32.39 KG/M2

## 2022-07-25 DIAGNOSIS — M19.172 POST-TRAUMATIC OSTEOARTHRITIS OF LEFT FOOT: Primary | ICD-10-CM

## 2022-07-25 DIAGNOSIS — M19.172 POST-TRAUMATIC OSTEOARTHRITIS OF LEFT ANKLE: ICD-10-CM

## 2022-07-25 PROCEDURE — 99213 OFFICE O/P EST LOW 20 MIN: CPT | Performed by: ORTHOPAEDIC SURGERY

## 2022-07-25 PROCEDURE — 1036F TOBACCO NON-USER: CPT | Performed by: ORTHOPAEDIC SURGERY

## 2022-07-25 PROCEDURE — G8417 CALC BMI ABV UP PARAM F/U: HCPCS | Performed by: ORTHOPAEDIC SURGERY

## 2022-07-25 PROCEDURE — 1123F ACP DISCUSS/DSCN MKR DOCD: CPT | Performed by: ORTHOPAEDIC SURGERY

## 2022-07-25 PROCEDURE — 20551 NJX 1 TENDON ORIGIN/INSJ: CPT | Performed by: ORTHOPAEDIC SURGERY

## 2022-07-25 PROCEDURE — G8427 DOCREV CUR MEDS BY ELIG CLIN: HCPCS | Performed by: ORTHOPAEDIC SURGERY

## 2022-07-25 RX ORDER — METHYLPREDNISOLONE ACETATE 40 MG/ML
40 INJECTION, SUSPENSION INTRA-ARTICULAR; INTRALESIONAL; INTRAMUSCULAR; SOFT TISSUE ONCE
Status: COMPLETED | OUTPATIENT
Start: 2022-07-25 | End: 2022-07-25

## 2022-07-25 RX ORDER — LIDOCAINE 5% 5 G/100G
CREAM TOPICAL
Qty: 45 G | Refills: 2 | Status: SHIPPED | OUTPATIENT
Start: 2022-07-25 | End: 2022-07-25 | Stop reason: SDUPTHER

## 2022-07-25 RX ORDER — LIDOCAINE 5% 5 G/100G
CREAM TOPICAL
Qty: 45 G | Refills: 2 | Status: SHIPPED | OUTPATIENT
Start: 2022-07-25

## 2022-07-25 RX ADMIN — METHYLPREDNISOLONE ACETATE 40 MG: 40 INJECTION, SUSPENSION INTRA-ARTICULAR; INTRALESIONAL; INTRAMUSCULAR; SOFT TISSUE at 11:43

## 2022-07-25 NOTE — PROGRESS NOTES
Name: Reggie Malloy  YOB: 1940  Gender: male  MRN: [id number]    04/26/2022: Last visit with me for: Left foot pain  Insoles from  have slightly helped but he still has pain along the lateral border of his left foot much more than right    HPI:   1994 Right pan-talar fusion  2011 Left triple arthrodesis  07/23/2020: Dx: Left plantarlateral foot overload, triple arthrodesis, ankle arthritis, midfoot arthritis   Prior multiple spinal procedures and fusions and left VEE   04/26/2022: He presented to assess his left foot - walks at home without any shoes     ROS/Meds/PSH/PMH/FH/SH: reviewed today    Tobacco:  reports that he quit smoking about 55 years ago. He has a 8.00 pack-year smoking history. He has never used smokeless tobacco.      Physical Examination:  Patient appears to be alert and oriented with acceptable appearance. No obvious distress or SOB  CV: appears to have acceptable vascular color and capillary refill  Neuro: appears to have mostly intact light touch sensation   Skin: No skin at risk signs; Left > Right plantar lateral midfoot exostosis/thickening  MS: Standing: Plantigrade feet with no gross rocker deformity: Gait rolling walker dependent  Right side: Left side = plantar lateral midfoot exostosis/thickening; insertional peroneal pain; no shaft pain  Right side: = Solid ankle and hindfoot  Left side: = Acceptable ankle motion; solid hindfoot     XR: Left side: Standing AP lateral mortise ankle plus AP oblique foot taken  04/26/2022 with ankle arthritis; dorsal navicular cuneiform exostosis; tarsometatarsal arthritis  XR Impression:  As above      07/25/2022: Injection: We discussed risk complication decided proceed with injection: After sterile prep, left lateral fifth metatarsal tuberosity/peroneal tendon insertional region injected with 1 cc Xylocaine and 40 mg Depo-Medrol.      Assessment:    Left solid triple arthrodesis; midfoot arthritis; ankle arthritis; lateral foot overload  Right pantalar fusion; plantar lateral foot thickening     Plan:  The patient and I discussed the above assessment. We explored treatment options. Unfortunately, he still has pain at the fifth metatarsal tuberosity insertion/peroneal tendonitis   He has no shaft pain; therefore, we will try injection and insole modification  If no improvement, consider MRI scan  We discussed surgical treatment with fusions and osteotomies but at this time, he would like to avoid surgery     Advanced medical imaging: Possible future: Left foot CT scan: Left foot MRI scan     He understands foot care and protection  PT: No indication for PT  Orthotic/prosthetic:  prosthetics:  2011: Left solid triple arthrodesis; midfoot arthritis; ankle arthritis; plantar lateral foot overload  1994: Right pantalar fusion     prosthetics: Please add lateral foot/lateral ray PQ/silicone to offload fifth metatarsal tuberosity pressure pain     Medication - OTC meds prn  Prescribed: Topical 5% Xylocaine, 5% Neurontin for possible lateral feet as directed    Surgical discussion: Surgical discussion for possible left midfoot fusion and osteotomies, but he would like to hold surgery at this timeFollow up: 6 weeks  Work status: Retired     This note was created using Dragon voice recognition software which may result in errors of speech and spelling recognition and word/phrase syntax errors.

## 2022-09-16 ENCOUNTER — TELEPHONE (OUTPATIENT)
Dept: ORTHOPEDIC SURGERY | Age: 82
End: 2022-09-16

## 2022-09-16 NOTE — TELEPHONE ENCOUNTER
Called pt as I noticed he cancelled an upcoming apt. Pt stated he still has Covid and will call to reschedule if needed.

## 2022-10-31 ENCOUNTER — OFFICE VISIT (OUTPATIENT)
Dept: UROLOGY | Age: 82
End: 2022-10-31
Payer: MEDICARE

## 2022-10-31 DIAGNOSIS — N39.41 URGE INCONTINENCE OF URINE: ICD-10-CM

## 2022-10-31 DIAGNOSIS — N40.1 HYPERTROPHY OF PROSTATE WITH URINARY OBSTRUCTION: Primary | ICD-10-CM

## 2022-10-31 DIAGNOSIS — N13.8 HYPERTROPHY OF PROSTATE WITH URINARY OBSTRUCTION: Primary | ICD-10-CM

## 2022-10-31 LAB
BILIRUBIN, URINE, POC: NEGATIVE
BLOOD URINE, POC: NORMAL
GLUCOSE URINE, POC: NEGATIVE
KETONES, URINE, POC: NEGATIVE
LEUKOCYTE ESTERASE, URINE, POC: NEGATIVE
NITRITE, URINE, POC: NEGATIVE
PH, URINE, POC: NORMAL (ref 4.6–8)
PROTEIN,URINE, POC: NEGATIVE
PVR, POC: 33 CC
SPECIFIC GRAVITY, URINE, POC: 1.02 (ref 1–1.03)
URINALYSIS CLARITY, POC: NORMAL
URINALYSIS COLOR, POC: NORMAL
UROBILINOGEN, POC: NORMAL

## 2022-10-31 PROCEDURE — 81003 URINALYSIS AUTO W/O SCOPE: CPT | Performed by: UROLOGY

## 2022-10-31 PROCEDURE — 1123F ACP DISCUSS/DSCN MKR DOCD: CPT | Performed by: UROLOGY

## 2022-10-31 PROCEDURE — 1036F TOBACCO NON-USER: CPT | Performed by: UROLOGY

## 2022-10-31 PROCEDURE — G8417 CALC BMI ABV UP PARAM F/U: HCPCS | Performed by: UROLOGY

## 2022-10-31 PROCEDURE — G8484 FLU IMMUNIZE NO ADMIN: HCPCS | Performed by: UROLOGY

## 2022-10-31 PROCEDURE — 99204 OFFICE O/P NEW MOD 45 MIN: CPT | Performed by: UROLOGY

## 2022-10-31 PROCEDURE — 51798 US URINE CAPACITY MEASURE: CPT | Performed by: UROLOGY

## 2022-10-31 PROCEDURE — G8427 DOCREV CUR MEDS BY ELIG CLIN: HCPCS | Performed by: UROLOGY

## 2022-10-31 RX ORDER — TESTOSTERONE 16.2 MG/G
GEL TRANSDERMAL
COMMUNITY
Start: 2022-10-21

## 2022-10-31 RX ORDER — PNV NO.95/FERROUS FUM/FOLIC AC 28MG-0.8MG
TABLET ORAL
COMMUNITY

## 2022-10-31 ASSESSMENT — ENCOUNTER SYMPTOMS
SKIN LESIONS: 0
VOMITING: 0
HEARTBURN: 0
EYE DISCHARGE: 0
BLOOD IN STOOL: 0
INDIGESTION: 0
NAUSEA: 0
COUGH: 0
CONSTIPATION: 0
EYE PAIN: 0
ABDOMINAL PAIN: 0
SHORTNESS OF BREATH: 0
DIARRHEA: 0
BACK PAIN: 0
WHEEZING: 0

## 2022-10-31 NOTE — PROGRESS NOTES
[facility]  [address]   [address]  [facility], [address]  [phone]    Coby Patricia  : 1940    CC: BPH        HPI     Coby Patricia is a 80 y.o.  male who is referred to clinic for evaluation of urinary incontinence secondary to presumed BPH and OAB. He is on flomax and TRT prescribed by his PCP. He does have a PMH of SALVATORE on CPAP. He also has a PMH of extensive back surgery and has hardware throughout most of his spine. Today, he reports urgency, frequency, urge urinary incontinence. He is on flomax + finasteride and has been on these X 2 years without improvement in symptoms. He tried mirabegron 25 mg for 3 weeks with his PCP and is unsure if this helped or not. He previously followed with Dr. Bird Goodman up until about 1 year ago. He has no history of  surgery. Has never had cystoscopy. Has been told he has a large prostate. NO hematuria. NO retention. No UTIs. No other concerns.      PSA 1.61 (2021)    PVR: 33 cc    IPSS: 12    Past Medical History:   Diagnosis Date    Anemia     Glaucoma     History of small bowel obstruction 2018    releived with bowel rest and NGT    HTN (hypertension)     Hypercholesterolemia     Iron overload     regular phlebotomy    Macular degeneration     Myelodysplastic syndrome (HCC)     Osteoarthritis of multiple joints     Peripheral neuropathy 2010    RLS (restless legs syndrome)     Sleep apnea     Spondylolisthesis, acquired 2010     Past Surgical History:   Procedure Laterality Date    CARPAL TUNNEL RELEASE Right 2014    CATARACT REMOVAL Bilateral 2017    CERVICAL DISCECTOMY  2002    CERVICAL FUSION   &     cervical plate placement     CHOLECYSTECTOMY  14    COLONOSCOPY      HERNIA REPAIR      JOINT REPLACEMENT      see Hips & Knees    LUMBAR LAMINECTOMY  9/2/10    lumbar laminectomy L4 x 2/ rods    NEUROLOGICAL SURGERY      T10-T12 fusion    ORTHOPEDIC SURGERY Right 1985    ankle fusion with hardware     ORTHOPEDIC SURGERY Left 2/14/2011    fusion of bones in foot    ORTHOPEDIC SURGERY  1/2013    rods in back     THORACIC LAMINECTOMY  4/2/09    T12, L2,L3    TOTAL HIP ARTHROPLASTY Bilateral     R in 2003, l in 2014    [address] [facility] Right 12/7/2009    TOTAL KNEE ARTHROPLASTY Left 12/8/2003     Current Outpatient Medications   Medication Sig Dispense Refill    Testosterone (ANDROGEL) 20.25 MG/ACT (1.62%) GEL gel APPLY 40.5 MG TOPICALLY IN THE MORNING. 1 PUMP TO EACH SHOULDER. Pediatric Multivitamins-Fl (MULTI-VIT-SANDRA PO) Take by mouth      Multiple Vitamins-Minerals (PRESERVISION AREDS 2+MULTI VIT PO) Take by mouth      Omega-3 Fatty Acids (FISH OIL OMEGA-3) 1000 MG CAPS Take by mouth      mirabegron (MYRBETRIQ) 50 MG TB24 Take 50 mg by mouth daily 90 tablet 3    Lidocaine 5 % CREA Topical 5% Lidocaine and 5% Neurontin to apply to affected area up to 4 times/day as needed for pain 45 g 2    vitamin D (CHOLECALCIFEROL) 25 MCG (1000 UT) TABS tablet Take 1,000 Units by mouth daily      finasteride (PROSCAR) 5 MG tablet Take 5 mg by mouth daily      folic acid (FOLVITE) 1 MG tablet Take by mouth daily      gabapentin (NEURONTIN) 400 MG capsule 1 po tid      latanoprost (XALATAN) 0.005 % ophthalmic solution Apply 1 drop to eye      lisinopril-hydroCHLOROthiazide (PRINZIDE;ZESTORETIC) 20-25 MG per tablet 1 po qd      meloxicam (MOBIC) 15 MG tablet Take 15 mg by mouth daily      pramipexole (MIRAPEX) 0.5 MG tablet 1 po 5 times daily      simvastatin (ZOCOR) 20 MG tablet 1 po qd      tamsulosin (FLOMAX) 0.4 MG capsule Take 0.4 mg by mouth daily      triamcinolone (KENALOG) 0.1 % cream Apply topically 2 times daily as needed      aspirin 81 MG EC tablet Take by mouth daily (Patient not taking: Reported on 10/31/2022)       No current facility-administered medications for this visit.      Allergies   Allergen Reactions    Metronidazole Other (See Comments) and Rash     Pt states burning sensation all over body. Other reaction(s): Unknown  Pt states burning sensation all over body. Azithromycin Other (See Comments)     Severe headache    Erythromycin Other (See Comments)     Severe headache    Oxycodone Rash     Other reaction(s): Unknown    Penicillins Rash    Quinolones Rash     Burning sensation all over body      Tramadol Rash     Other reaction(s): Unknown     Social History     Socioeconomic History    Marital status:      Spouse name: Not on file    Number of children: Not on file    Years of education: Not on file    Highest education level: Not on file   Occupational History    Not on file   Tobacco Use    Smoking status: Former     Packs/day: 1.00     Years: 5.00     Pack years: 5.00     Types: Cigarettes     Quit date: 1967     Years since quittin.8    Smokeless tobacco: Never   Substance and Sexual Activity    Alcohol use: Yes     Alcohol/week: 14.0 standard drinks     Types: 14 Cans of beer per week    Drug use: No     Types: Prescription    Sexual activity: Not Currently     Partners: Female   Other Topics Concern    Not on file   Social History Narrative    Not on file     Social Determinants of Health     Financial Resource Strain: Not on file   Food Insecurity: Not on file   Transportation Needs: Not on file   Physical Activity: Not on file   Stress: Not on file   Social Connections: Not on file   Intimate Partner Violence: Not on file   Housing Stability: Not on file     Family History   Problem Relation Age of Onset    Cancer Mother         hx of lymph node    Heart Disease Mother         carotid enarterectomy    Clotting Disorder Mother     Osteoarthritis Brother     Lung Disease Brother         COPD    Cancer Father         prostate       Review of Systems  Constitutional:   Negative for fever, chills, appetite change, malaise/fatigue, headaches and weight loss. Skin:  Negative for skin lesions, rash and itching.   Eyes:  Negative for visual disturbance, eye pain and eye discharge. ENT:  Negative for difficulty articulating words, pain swallowing, high frequency hearing loss and dry mouth. Respiratory:  Negative for cough, blood in sputum, shortness of breath and wheezing. Cardiovascular:  Negative for chest pain, hypertension, irregular heartbeat, leg pain, leg swelling, regular rate and rhythm and varicose veins. GI:  Negative for nausea, vomiting, abdominal pain, blood in stool, constipation, diarrhea, indigestion and heartburn. Genitourinary:  Negative for urinary burning, hematuria, flank pain, recurrent UTIs, history of urolithiasis, nocturia, slower stream, straining, urgency, leakage w/ urge, frequent urination, incomplete emptying, erectile dysfunction, testicular pain, sexually transmitted disease, discharge and urethral stricture. Musculoskeletal:  Negative for back pain, bone pain, arthralgias, tenderness, muscle weakness and neck pain. Neurological:  Negative for dizziness, focal weakness, numbness, seizures and tremors. Psychological:  Negative for depression and psychiatric problem. Endocrine:  Negative for cold intolerance, thirst, excessive urination, fatigue and heat intolerance. Hem/Lymphatic:  Negative for easy bleeding, easy bruising and frequent infections. Urinalysis  UA - Dipstick  @LASTPROCAMB([id number])@    UA - Micro  WBC - 0  RBC - 0  Bacteria - 0  Epith - 0    There were no vitals taken for this visit.      GENERAL: No acute distress, Awake, Alert, Oriented X 3, Gait normal  CARDIAC: regular rate and rhythm  CHEST AND LUNG: Easy work of breathing, clear to auscultation bilaterally, no cyanosis  ABDOMEN: soft, non tender, non-distended, positive bowel sounds, no organomegaly, no palpable masses, no guarding, no rebound tenderness  TIERRA: 50 gram, symmetric, smooth without nodules  SKIN: No rash, no erythema, no lacerations or abrasions, no ecchymosis  NEUROLOGIC: cranial nerves 2-12 grossly intact       Assessment and Plan    ICD-10-CM    1. Hypertrophy of prostate with urinary obstruction  N40.1 AMB POC URINALYSIS DIP STICK AUTO W/O MICRO    N13.8       2. Urge incontinence of urine  N39.41 AMB POC URINALYSIS DIP STICK AUTO W/O MICRO     AMB POC PVR, SADIE,POST-VOID RES,US,NON-IMAGING     mirabegron (MYRBETRIQ) 50 MG TB24        BPH/LUTS and OAB:     I discussed BPH and OAB at length with patient today. We discussed it's natural progression with aging. We discussed treatment options including doing nothing, adding a medication (beta 3 agonst), and surgical options (urolift vs. Rezum vs. TURP vs. plasmabutton PVP vs. Botox vs interstim). Advantages and potential risks/side effects of each were discussed. He has decided to move forward with addition of mirabegron 50 mg to flomax/finasteride. LUTS likely combination of BPH and OAB from his prior back surgery. Will return in 2 months for follow up. If no improvement, then will consider cysto + TRUS for further work up. I have spent 45 minutes today reviewing previous notes, test results and face to face with the patient as well as documenting. Rodrigo Villeda M.D.     [facility]  [place] [address] [facility]  Phone: [phone]  Fax: [phone]

## 2022-12-19 NOTE — PATIENT INSTRUCTIONS
Patient Education        Chest Pain: Care Instructions  Overview     There are many things that can cause chest pain. Some are not serious and will get better on their own in a few days. But some kinds of chest pain need more testing and treatment. Your doctor may have recommended a follow-up visit in the next few days. If you are not getting better, you may need more tests or treatment. Even though your doctor has released you, you still need to watch for any problems. The doctor carefully checked you, but sometimes problems can develop later. If you have new symptoms or if your symptoms do not get better, get medical care right away. If you have worse or different chest pain or pressure that lasts more than 5 minutes or you passed out (lost consciousness), call 911 or seek other emergency help right away. A medical visit is only one step in your treatment. Even if you feel better, you still need to do what your doctor recommends, such as going to all suggested follow-up appointments and taking medicines exactly as directed. This will help you recover and help prevent future problems. How can you care for yourself at home? Rest until you feel better. Take your medicine exactly as prescribed. Call your doctor if you think you are having a problem with your medicine. Do not drive after taking a prescription pain medicine. When should you call for help? Call 911 if: You passed out (lost consciousness). You have severe difficulty breathing. You have symptoms of a heart attack. These may include:  Chest pain or pressure, or a strange feeling in your chest.  Sweating. Shortness of breath. Nausea or vomiting. Pain, pressure, or a strange feeling in your back, neck, jaw, or upper belly or in one or both shoulders or arms. Lightheadedness or sudden weakness. A fast or irregular heartbeat. After you call 911, the  may tell you to chew 1 adult-strength or 2 to 4 low-dose aspirin.  Wait for

## 2022-12-19 NOTE — PROGRESS NOTES
[facility] CARDIOLOGY  [address] [facility], [address] [facility], [address] [place] [address]  PHONE: [phone]      22    NAME:  Eden Taylor  : 1940  MRN: [id number]         SUBJECTIVE:   Eden Taylor is a 80 y.o. male seen for a follow up visit regarding the following:     Chief Complaint   Patient presents with    Hypertension    Consultation              HPI:  Follow up  Hypertension and Consultation   . Patient overdue for follow up. Was seen about 18 months ago  With murmur d/t AV sclerosis, hypertension with orthostatic hypotension, also being followed for myelodysplastic  syndrome (low grade), thoracic myelopathy. Stress test and monitor normal at that time. Returns having had another stress perfusion study with reversible LAD territory ischemia and TID 1.39 suggesting multivessel disease. He started seeing Dr Lolis Haynes as a new patient, he described his orthostatic symptoms at their visit, meds were appropriately reduced. He then states he had some kind of test done and that it indicated he needed a stress test which was subsequently done. His orthostatic symptoms resolved. He has not had chest discomfort, dyspnea or change in his chronic edema. Mobility is chronically limited. I found it was a coronary calcium score 732. He does have a feeling of his calves feeling cold when he first gets up, goes away after he gets up and showers. Past cardiac history:   - Normal EF, mild LVH, impaired relaxation, AV sclerosis without stenosis   May 2020- Echo normal SF, DF, mild AV sclerosis, no stenosis   2021- normal vasodilator perfusion study   7 day monitor - NSR, rare pac. 5.5 seconds atrial tachycardia. Several diary entries of fluttering generally with NSR, occasionally with PAC.   Atrial tachy episode associated with brief light headedness  2022       NST - reversible mid and distal anterior defect with TID 1.39, preserved EF             Key CAD CHF Meds            rosuvastatin (CRESTOR) 20 MG tablet (Taking)    Sig - Route: Take 1 tablet by mouth daily - Oral    lisinopril-hydroCHLOROthiazide (PRINZIDE;ZESTORETIC) 20-25 MG per tablet (Taking)    Class: Historical Med          Key Antihyperglycemic Medications       Patient is on no antihyperglycemic meds. Past Medical History, Past Surgical History, Family history, Social History, and Medications were all reviewed with the patient today and updated as necessary. Prior to Admission medications    Medication Sig Start Date End Date Taking? Authorizing Provider   Multiple Vitamins-Minerals (ABC COMPLETE SENIOR 50+ PO) Take by mouth daily   Yes Historical Provider, MD   NONFORMULARY daily Restless leg vitamin   Yes Historical Provider, MD   Apoaequorin (PREVAGEN PO) Take by mouth daily   Yes Historical Provider, MD   aspirin 81 MG EC tablet Take 1 tablet by mouth daily 12/20/22  Yes Melia De Paz MD   rosuvastatin (CRESTOR) 20 MG tablet Take 1 tablet by mouth daily 12/20/22  Yes Melia De Paz MD   Testosterone (ANDROGEL) 20.25 MG/ACT (1.62%) GEL gel APPLY 40.5 MG TOPICALLY IN THE MORNING.  1 PUMP TO EACH SHOULDER. 10/21/22  Yes Historical Provider, MD   Multiple Vitamins-Minerals (PRESERVISION AREDS 2+MULTI VIT PO) Take by mouth   Yes Historical Provider, MD   Omega-3 Fatty Acids (FISH OIL OMEGA-3) 1000 MG CAPS Take by mouth   Yes Historical Provider, MD   mirabegron (MYRBETRIQ) 50 MG TB24 Take 50 mg by mouth daily 10/31/22  Yes José Miguel Ferguson MD   Lidocaine 5 % CREA Topical 5% Lidocaine and 5% Neurontin to apply to affected area up to 4 times/day as needed for pain 7/25/22  Yes Gini Taylor MD   vitamin D (CHOLECALCIFEROL) 25 MCG (1000 UT) TABS tablet Take 1,000 Units by mouth daily   Yes Ar Automatic Reconciliation   finasteride (PROSCAR) 5 MG tablet Take 5 mg by mouth daily   Yes Ar Automatic Reconciliation   folic acid (FOLVITE) 1 MG tablet Take by mouth daily   Yes Ar Automatic Reconciliation   gabapentin (NEURONTIN) 400 MG capsule 1 po tid 8/9/18  Yes Ar Automatic Reconciliation   latanoprost (XALATAN) 0.005 % ophthalmic solution Apply 1 drop to eye   Yes Ar Automatic Reconciliation   lisinopril-hydroCHLOROthiazide (PRINZIDE;ZESTORETIC) 20-25 MG per tablet 1 po qd 8/9/18  Yes Ar Automatic Reconciliation   meloxicam (MOBIC) 15 MG tablet Take 15 mg by mouth daily   Yes Ar Automatic Reconciliation   pramipexole (MIRAPEX) 0.5 MG tablet 1 po 5 times daily 8/9/18  Yes Ar Automatic Reconciliation   tamsulosin (FLOMAX) 0.4 MG capsule Take 0.4 mg by mouth daily   Yes Ar Automatic Reconciliation   triamcinolone (KENALOG) 0.1 % cream Apply topically 2 times daily as needed   Yes Ar Automatic Reconciliation     Allergies   Allergen Reactions    Metronidazole Other (See Comments) and Rash     Pt states burning sensation all over body. Other reaction(s): Unknown  Pt states burning sensation all over body.      Azithromycin Other (See Comments)     Severe headache    Erythromycin Other (See Comments)     Severe headache    Gadolinium Derivatives     Oxycodone Rash     Other reaction(s): Unknown    Penicillins Rash    Quinolones Rash     Burning sensation all over body      Tramadol Rash     Other reaction(s): Unknown     Past Medical History:   Diagnosis Date    Anemia     Glaucoma     History of small bowel obstruction 09/2018    releived with bowel rest and NGT    HTN (hypertension)     Hypercholesterolemia     Iron overload     regular phlebotomy    Macular degeneration     Myelodysplastic syndrome (HCC)     Osteoarthritis of multiple joints     Peripheral neuropathy 11/1/2010    RLS (restless legs syndrome)     Sleep apnea     Spondylolisthesis, acquired 12/5/2010     Past Surgical History:   Procedure Laterality Date    CARPAL TUNNEL RELEASE Right 2014    CATARACT REMOVAL Bilateral 2017    CERVICAL DISCECTOMY  5/2002    CERVICAL FUSION  2002 & 2011    cervical plate placement CHOLECYSTECTOMY  14    COLONOSCOPY      HERNIA REPAIR      JOINT REPLACEMENT      see Hips & Knees    LUMBAR LAMINECTOMY  9/2/10    lumbar laminectomy L4 x 2/ rods    NEUROLOGICAL SURGERY  2013    T10-T12 fusion    ORTHOPEDIC SURGERY Right 1985    ankle fusion with hardware     ORTHOPEDIC SURGERY Left 2011    fusion of bones in foot    ORTHOPEDIC SURGERY  2013    rods in back     THORACIC LAMINECTOMY  09    T12, L2,L3    TOTAL HIP ARTHROPLASTY Bilateral     R in , l in     TOTAL KNEE ARTHROPLASTY Right 2009    TOTAL KNEE ARTHROPLASTY Left 2003     Family History   Problem Relation Age of Onset    Cancer Mother         hx of lymph node    Heart Disease Mother         carotid enarterectomy    Clotting Disorder Mother     Osteoarthritis Brother     Lung Disease Brother         COPD    Cancer Father         prostate     Social History     Tobacco Use    Smoking status: Former     Packs/day: 1.00     Years: 5.00     Pack years: 5.00     Types: Cigarettes     Quit date: 1967     Years since quittin.0    Smokeless tobacco: Never   Substance Use Topics    Alcohol use: Yes     Alcohol/week: 14.0 standard drinks     Types: 14 Cans of beer per week       ROS:    Review of Systems   Cardiovascular:  Negative for chest pain. Respiratory:  Negative for shortness of breath. Musculoskeletal:  Positive for joint pain. Neurological:  Positive for paresthesias. PHYSICAL EXAM:   BP (!) 116/58   Pulse 83   Ht 4' 11\" (1.499 m)   Wt 150 lb 3.2 oz (68.1 kg)   BMI 30.34 kg/m²      Wt Readings from Last 3 Encounters:   22 150 lb 3.2 oz (68.1 kg)   10/04/22 165 lb (74.8 kg)   22 165 lb (74.8 kg)     BP Readings from Last 3 Encounters:   22 (!) 116/58   10/04/22 138/74   22 (!) 136/55     Pulse Readings from Last 3 Encounters:   22 83   10/04/22 74   22 71           Physical Exam  Constitutional:       Appearance: Normal appearance. HENT:      Head: Normocephalic and atraumatic. Eyes:      Conjunctiva/sclera: Conjunctivae normal.   Neck:      Vascular: No carotid bruit. Cardiovascular:      Rate and Rhythm: Normal rate and regular rhythm. Heart sounds: No murmur heard. No friction rub. No gallop. Pulmonary:      Effort: No respiratory distress. Breath sounds: No wheezing or rales. Musculoskeletal:         General: Swelling (1+ below knees) present. Cervical back: Neck supple. Skin:     General: Skin is warm and dry. Neurological:      General: No focal deficit present. Mental Status: He is alert. Psychiatric:         Mood and Affect: Mood normal.         Behavior: Behavior normal.       Medical problems and test results were reviewed with the patient today. DATA REVIEW      BMP  Lab Results   Component Value Date/Time     06/16/2022 01:18 PM    K 4.6 06/16/2022 01:18 PM     06/16/2022 01:18 PM    CO2 27 06/16/2022 01:18 PM    BUN 31 06/16/2022 01:18 PM    CREATININE 0.90 06/16/2022 01:18 PM    GLUCOSE 104 06/16/2022 01:18 PM    CALCIUM 8.9 06/16/2022 01:18 PM       TG 93 HDL 51 LDL 58    EKG    10/5/22 - NSR, normal axis, intervals, nsst flattening. CXR/IMAGING        DEVICE INTERROGATION        OUTSIDE RECORDS REVIEW    Records from outside providers have been reviewed and summarized as noted in the HPI, past history and data review sections of this note, and reviewed with patient. .       ASSESSMENT and PLAN    Maykel Hayden was seen today for hypertension and consultation. Diagnoses and all orders for this visit:    Essential hypertension    Orthostatic hypotension    Chest discomfort    Palpitations    Abnormal nuclear stress test  -     Case Request Cardiac Cath Lab  -     Basic Metabolic Panel; Future  -     CBC; Future    Dyslipidemia    Other orders  -     aspirin 81 MG EC tablet; Take 1 tablet by mouth daily  -     rosuvastatin (CRESTOR) 20 MG tablet;  Take 1 tablet by mouth daily        IMPRESSION:    Paucity of symptoms but imaging suggests high risk left main or multivessel disease with coronary calcium score also > 700. Limited mobility likely masking symptoms. Recommend intensification of statin therapy given direct relationship btw dose and survival/risk reduction, resume low dose ASA and arrange LHC/PCI. Discussed risks of procedure including, but not limited to, bleeding, infection, MI, CVA, renal failure, allergic reaction, arterial damage. Patient understands and wishes to proceed. He does not tolerate additional therapy such as BB d/t severe orthostatic hypotension which improved on lower dose ACEi. Return for AFTER PROCEDURE TO REVIEW RESULTS. Thank you for allowing me to participate in this patient's care. Please call or contact me if there are any questions or concerns regarding the above.       Darryle Lope, MD  12/20/22  2:20 PM

## 2022-12-20 ENCOUNTER — INITIAL CONSULT (OUTPATIENT)
Dept: CARDIOLOGY CLINIC | Age: 82
End: 2022-12-20
Payer: MEDICARE

## 2022-12-20 VITALS
WEIGHT: 150.2 LBS | BODY MASS INDEX: 29.49 KG/M2 | HEART RATE: 83 BPM | HEIGHT: 60 IN | SYSTOLIC BLOOD PRESSURE: 116 MMHG | DIASTOLIC BLOOD PRESSURE: 58 MMHG

## 2022-12-20 DIAGNOSIS — R94.39 ABNORMAL NUCLEAR STRESS TEST: ICD-10-CM

## 2022-12-20 DIAGNOSIS — R07.89 CHEST DISCOMFORT: ICD-10-CM

## 2022-12-20 DIAGNOSIS — I10 ESSENTIAL HYPERTENSION: Primary | ICD-10-CM

## 2022-12-20 DIAGNOSIS — I95.1 ORTHOSTATIC HYPOTENSION: ICD-10-CM

## 2022-12-20 DIAGNOSIS — E78.5 DYSLIPIDEMIA: ICD-10-CM

## 2022-12-20 DIAGNOSIS — R00.2 PALPITATIONS: ICD-10-CM

## 2022-12-20 LAB
ANION GAP SERPL CALC-SCNC: 7 MMOL/L (ref 2–11)
BUN SERPL-MCNC: 30 MG/DL (ref 8–23)
CALCIUM SERPL-MCNC: 9.1 MG/DL (ref 8.3–10.4)
CHLORIDE SERPL-SCNC: 103 MMOL/L (ref 101–110)
CO2 SERPL-SCNC: 28 MMOL/L (ref 21–32)
CREAT SERPL-MCNC: 1 MG/DL (ref 0.8–1.5)
ERYTHROCYTE [DISTWIDTH] IN BLOOD BY AUTOMATED COUNT: 16 % (ref 11.9–14.6)
GLUCOSE SERPL-MCNC: 97 MG/DL (ref 65–100)
HCT VFR BLD AUTO: 29.9 % (ref 41.1–50.3)
HGB BLD-MCNC: 9.6 G/DL (ref 13.6–17.2)
MCH RBC QN AUTO: 32.8 PG (ref 26.1–32.9)
MCHC RBC AUTO-ENTMCNC: 32.1 G/DL (ref 31.4–35)
MCV RBC AUTO: 102 FL (ref 82–102)
NRBC # BLD: 0 K/UL (ref 0–0.2)
PLATELET # BLD AUTO: 154 K/UL (ref 150–450)
PMV BLD AUTO: 11.1 FL (ref 9.4–12.3)
POTASSIUM SERPL-SCNC: 4.4 MMOL/L (ref 3.5–5.1)
RBC # BLD AUTO: 2.93 M/UL (ref 4.23–5.6)
SODIUM SERPL-SCNC: 138 MMOL/L (ref 133–143)
WBC # BLD AUTO: 5.7 K/UL (ref 4.3–11.1)

## 2022-12-20 PROCEDURE — G8417 CALC BMI ABV UP PARAM F/U: HCPCS | Performed by: INTERNAL MEDICINE

## 2022-12-20 PROCEDURE — G8427 DOCREV CUR MEDS BY ELIG CLIN: HCPCS | Performed by: INTERNAL MEDICINE

## 2022-12-20 PROCEDURE — 1036F TOBACCO NON-USER: CPT | Performed by: INTERNAL MEDICINE

## 2022-12-20 PROCEDURE — 3078F DIAST BP <80 MM HG: CPT | Performed by: INTERNAL MEDICINE

## 2022-12-20 PROCEDURE — 99214 OFFICE O/P EST MOD 30 MIN: CPT | Performed by: INTERNAL MEDICINE

## 2022-12-20 PROCEDURE — G8484 FLU IMMUNIZE NO ADMIN: HCPCS | Performed by: INTERNAL MEDICINE

## 2022-12-20 PROCEDURE — 3074F SYST BP LT 130 MM HG: CPT | Performed by: INTERNAL MEDICINE

## 2022-12-20 PROCEDURE — 1123F ACP DISCUSS/DSCN MKR DOCD: CPT | Performed by: INTERNAL MEDICINE

## 2022-12-20 RX ORDER — ASPIRIN 81 MG/1
81 TABLET ORAL DAILY
Qty: 30 TABLET | COMMUNITY
Start: 2022-12-20

## 2022-12-20 RX ORDER — ROSUVASTATIN CALCIUM 20 MG/1
20 TABLET, COATED ORAL DAILY
Qty: 90 TABLET | Refills: 3 | Status: SHIPPED | OUTPATIENT
Start: 2022-12-20

## 2022-12-20 ASSESSMENT — ENCOUNTER SYMPTOMS: SHORTNESS OF BREATH: 0

## 2022-12-21 PROBLEM — I25.118 CORONARY ARTERY DISEASE OF NATIVE ARTERY OF NATIVE HEART WITH STABLE ANGINA PECTORIS (HCC): Status: ACTIVE | Noted: 2022-12-21

## 2022-12-28 DIAGNOSIS — D46.9 MYELODYSPLASTIC SYNDROME (HCC): Primary | ICD-10-CM

## 2022-12-29 ENCOUNTER — OFFICE VISIT (OUTPATIENT)
Dept: ONCOLOGY | Age: 82
End: 2022-12-29
Payer: MEDICARE

## 2022-12-29 ENCOUNTER — HOSPITAL ENCOUNTER (OUTPATIENT)
Dept: LAB | Age: 82
Discharge: HOME | End: 2022-12-29
Payer: MEDICARE

## 2022-12-29 VITALS
RESPIRATION RATE: 14 BRPM | BODY MASS INDEX: 30.43 KG/M2 | HEART RATE: 87 BPM | DIASTOLIC BLOOD PRESSURE: 66 MMHG | SYSTOLIC BLOOD PRESSURE: 150 MMHG | OXYGEN SATURATION: 97 % | TEMPERATURE: 98.7 F | HEIGHT: 60 IN | WEIGHT: 155 LBS

## 2022-12-29 DIAGNOSIS — D46.9 MYELODYSPLASTIC SYNDROME (HCC): ICD-10-CM

## 2022-12-29 DIAGNOSIS — D46.9 MYELODYSPLASTIC SYNDROME (HCC): Primary | ICD-10-CM

## 2022-12-29 DIAGNOSIS — E83.19 IRON OVERLOAD: ICD-10-CM

## 2022-12-29 LAB
ALBUMIN SERPL-MCNC: 4 G/DL (ref 3.2–4.6)
ALBUMIN/GLOB SERPL: 1.4 {RATIO} (ref 0.4–1.6)
ALP SERPL-CCNC: 94 U/L (ref 50–136)
ALT SERPL-CCNC: 29 U/L (ref 12–65)
ANION GAP SERPL CALC-SCNC: 7 MMOL/L (ref 2–11)
AST SERPL-CCNC: 22 U/L (ref 15–37)
BASOPHILS # BLD: 0 K/UL (ref 0–0.2)
BASOPHILS NFR BLD: 0 % (ref 0–2)
BILIRUB SERPL-MCNC: 0.3 MG/DL (ref 0.2–1.1)
BUN SERPL-MCNC: 24 MG/DL (ref 8–23)
CALCIUM SERPL-MCNC: 8.7 MG/DL (ref 8.3–10.4)
CHLORIDE SERPL-SCNC: 105 MMOL/L (ref 101–110)
CO2 SERPL-SCNC: 26 MMOL/L (ref 21–32)
CREAT SERPL-MCNC: 0.9 MG/DL (ref 0.8–1.5)
DIFFERENTIAL METHOD BLD: ABNORMAL
EOSINOPHIL # BLD: 0.1 K/UL (ref 0–0.8)
EOSINOPHIL NFR BLD: 2 % (ref 0.5–7.8)
ERYTHROCYTE [DISTWIDTH] IN BLOOD BY AUTOMATED COUNT: 16 % (ref 11.9–14.6)
ERYTHROCYTE [SEDIMENTATION RATE] IN BLOOD: 2 MM/HR (ref 0–20)
FERRITIN SERPL-MCNC: 65 NG/ML (ref 8–388)
GLOBULIN SER CALC-MCNC: 2.8 G/DL (ref 2.8–4.5)
GLUCOSE SERPL-MCNC: 108 MG/DL (ref 65–100)
HCT VFR BLD AUTO: 29.3 %
HGB BLD-MCNC: 9.7 G/DL (ref 13.6–17.2)
HGB RETIC QN AUTO: 36 PG (ref 29–35)
IMM GRANULOCYTES # BLD AUTO: 0 K/UL (ref 0–0.5)
IMM GRANULOCYTES NFR BLD AUTO: 0 % (ref 0–5)
IMM RETICS NFR: 13.2 % (ref 2.3–13.4)
IRON SATN MFR SERPL: 22 %
IRON SERPL-MCNC: 83 UG/DL (ref 35–150)
LYMPHOCYTES # BLD: 0.9 K/UL (ref 0.5–4.6)
LYMPHOCYTES NFR BLD: 17 % (ref 13–44)
MCH RBC QN AUTO: 32.8 PG (ref 26.1–32.9)
MCHC RBC AUTO-ENTMCNC: 33.1 G/DL (ref 31.4–35)
MCV RBC AUTO: 99 FL (ref 82–102)
MONOCYTES # BLD: 0.4 K/UL (ref 0.1–1.3)
MONOCYTES NFR BLD: 8 % (ref 4–12)
NEUTS SEG # BLD: 4.1 K/UL (ref 1.7–8.2)
NEUTS SEG NFR BLD: 73 % (ref 43–78)
NRBC # BLD: 0 K/UL (ref 0–0.2)
PLATELET # BLD AUTO: 151 K/UL (ref 150–450)
PMV BLD AUTO: 10.2 FL (ref 9.4–12.3)
POTASSIUM SERPL-SCNC: 4.1 MMOL/L (ref 3.5–5.1)
PROT SERPL-MCNC: 6.8 G/DL (ref 6.3–8.2)
RBC # BLD AUTO: 2.96 M/UL (ref 4.23–5.6)
RETICS # AUTO: 0.05 M/UL (ref 0.03–0.1)
RETICS/RBC NFR AUTO: 1.6 % (ref 0.3–2)
SODIUM SERPL-SCNC: 138 MMOL/L (ref 133–143)
TIBC SERPL-MCNC: 371 UG/DL (ref 250–450)
WBC # BLD AUTO: 5.6 K/UL (ref 4.3–11.1)

## 2022-12-29 PROCEDURE — 83550 IRON BINDING TEST: CPT

## 2022-12-29 PROCEDURE — 3074F SYST BP LT 130 MM HG: CPT | Performed by: INTERNAL MEDICINE

## 2022-12-29 PROCEDURE — 36415 COLL VENOUS BLD VENIPUNCTURE: CPT

## 2022-12-29 PROCEDURE — 85652 RBC SED RATE AUTOMATED: CPT

## 2022-12-29 PROCEDURE — 99214 OFFICE O/P EST MOD 30 MIN: CPT | Performed by: INTERNAL MEDICINE

## 2022-12-29 PROCEDURE — G8427 DOCREV CUR MEDS BY ELIG CLIN: HCPCS | Performed by: INTERNAL MEDICINE

## 2022-12-29 PROCEDURE — 3078F DIAST BP <80 MM HG: CPT | Performed by: INTERNAL MEDICINE

## 2022-12-29 PROCEDURE — 85046 RETICYTE/HGB CONCENTRATE: CPT

## 2022-12-29 PROCEDURE — 80053 COMPREHEN METABOLIC PANEL: CPT

## 2022-12-29 PROCEDURE — G8417 CALC BMI ABV UP PARAM F/U: HCPCS | Performed by: INTERNAL MEDICINE

## 2022-12-29 PROCEDURE — 1123F ACP DISCUSS/DSCN MKR DOCD: CPT | Performed by: INTERNAL MEDICINE

## 2022-12-29 PROCEDURE — 1036F TOBACCO NON-USER: CPT | Performed by: INTERNAL MEDICINE

## 2022-12-29 PROCEDURE — G8484 FLU IMMUNIZE NO ADMIN: HCPCS | Performed by: INTERNAL MEDICINE

## 2022-12-29 PROCEDURE — 85025 COMPLETE CBC W/AUTO DIFF WBC: CPT

## 2022-12-29 PROCEDURE — 82728 ASSAY OF FERRITIN: CPT

## 2022-12-29 ASSESSMENT — PATIENT HEALTH QUESTIONNAIRE - PHQ9
SUM OF ALL RESPONSES TO PHQ QUESTIONS 1-9: 0
2. FEELING DOWN, DEPRESSED OR HOPELESS: 0

## 2022-12-29 NOTE — PROGRESS NOTES
Data Source: Patient, [facility] record. 12/29/2022    4:21 PM    Lynnann Cheadle [id number]    80 y.o. Patient Encounter: [address] Visit     Heme Diagnosis:   MDS   TREVOR   CKD - on intermittent procrit in past.       Heme History (Copied from prior):    Recurrent mild iron deficiency anemia takes oral iron but now corrected ample iron stores and residual anemia due to renal failure occasional procrit shot see monthly for     Problem List   Date Reviewed: 5/7/2013             Codes  Class  Noted       Anemia of renal disease  285.21    12/20/2010       Overview       Addendum 9/6/2013 10:12 AM by Agapito Oliver MD       Low epo levels on procrit response     9-06-13 no need for procrit for some time hemoglobin 10 .4 check monthly see 6 monthly                 Iron deficiency  280.9    11/19/2010       Overview       Addendum 9/6/2013 10:11 AM by Agapito Oliver MD       Supportive Care Flowsheet  11/23/2010 11/23/2010     Day, Cycle  Day 1, Cycle 1       ferumoxytol (FERAHEME) IV         iron dextran complex 50 mg/mL (INFED) IV  [ 25 mg ]  1,000 mg             Supportive Care Flowsheet  5/28/2012 6/4/2012     Day, Cycle  Day 1, Cycle 1  Day 8, Cycle 1     ferumoxytol (FERAHEME) IV  510 mg  510 mg     iron dextran complex 50 mg/mL (INFED) IV            5-2012 repeated     11-07-12 low iron stores regular follow up transferrin saturation  And replacement   9-06-13 iron stores still good  Results for Kami Quiñones () as of 9/6/2013 10:09     9/6/2013      Iron  76     TIBC  262     Transferrin Saturation  29     Ferritin  1203 (H)            Thoracic myelopathy  721.41  Present on Admission  1/29/2013 12/14: BM biopsy w/ RARS low risk MDS . Repeat chromium and cobalt levels pending. No new complaints. 02/15: doing well. Hgb returning to normal .Chromium levels self improving, and he regularly follows w/ ortho for this. Zinc and copper normal. Vit D deficient. 04/23/15: travelling to [facility] 09/15. Hgb improved. Off iron since last visit as ferritin was high.    8/20/15: No new complaints. Taking Vit D regularly. Hgb stable. Cobalt levels improved compared to prior. Ferritin high but trending downwards. Hemochromatosis  gene pending. Interval History:  12/29/2022: Reports doing reasonably. Now plans for heart cath after an abnormal stress test.  Denies any symptoms. Denies any bleeding. Blood work with hemoglobin stable at 9.7, platelet count normal range at 1 51,000. Iron stores adequate with ferritin 65. ESR normal.  Continue monitoring, we will see him back in 6 months. REVIEW OF SYSTEMS:  As mentioned above, all other systems were reviewed in full and are negative.     Past Medical History:   Diagnosis Date    Anemia     Coronary artery disease of native artery of native heart with stable angina pectoris ([facility] [address] 12/21/2022    Glaucoma     History of small bowel obstruction 09/2018    releived with bowel rest and NGT    HTN (hypertension)     Hypercholesterolemia     Iron overload     regular phlebotomy    Macular degeneration     Myelodysplastic syndrome (HCC)     Osteoarthritis of multiple joints     Peripheral neuropathy 11/1/2010    RLS (restless legs syndrome)     Sleep apnea     Spondylolisthesis, acquired 12/5/2010       Past Surgical History:   Procedure Laterality Date    CARPAL TUNNEL RELEASE Right 2014    CATARACT REMOVAL Bilateral 2017    CERVICAL DISCECTOMY  5/2002    CERVICAL FUSION  2002 & 2011    cervical plate placement     CHOLECYSTECTOMY  4/17/14    COLONOSCOPY  2001/2008/2018    HERNIA REPAIR      JOINT REPLACEMENT      see Hips & Knees    LUMBAR LAMINECTOMY  9/2/10    lumbar laminectomy L4 x 2/ rods    NEUROLOGICAL SURGERY  2013    T10-T12 fusion    ORTHOPEDIC SURGERY Right 1985    ankle fusion with hardware     ORTHOPEDIC SURGERY Left 2/14/2011    fusion of bones in foot    ORTHOPEDIC SURGERY  1/2013    rods in back     THORACIC LAMINECTOMY 4/2/09    T12, L2,L3    TOTAL HIP ARTHROPLASTY Bilateral     R in 2003, l in 2014    TOTAL KNEE ARTHROPLASTY Right 12/7/2009    TOTAL KNEE ARTHROPLASTY Left 12/8/2003       Current Outpatient Medications   Medication Sig Dispense Refill    Multiple Vitamins-Minerals (ABC COMPLETE SENIOR 50+ PO) Take by mouth daily      NONFORMULARY daily Restless leg vitamin      Apoaequorin (PREVAGEN PO) Take by mouth daily      aspirin 81 MG EC tablet Take 1 tablet by mouth daily 30 tablet     rosuvastatin (CRESTOR) 20 MG tablet Take 1 tablet by mouth daily 90 tablet 3    Testosterone (ANDROGEL) 20.25 MG/ACT (1.62%) GEL gel APPLY 40.5 MG TOPICALLY IN THE MORNING. 1 PUMP TO EACH SHOULDER. Multiple Vitamins-Minerals (PRESERVISION AREDS 2+MULTI VIT PO) Take by mouth      Omega-3 Fatty Acids (FISH OIL OMEGA-3) 1000 MG CAPS Take by mouth      mirabegron (MYRBETRIQ) 50 MG TB24 Take 50 mg by mouth daily 90 tablet 3    vitamin D (CHOLECALCIFEROL) 25 MCG (1000 UT) TABS tablet Take 1,000 Units by mouth daily      finasteride (PROSCAR) 5 MG tablet Take 5 mg by mouth daily      folic acid (FOLVITE) 1 MG tablet Take by mouth daily      gabapentin (NEURONTIN) 400 MG capsule 1 po tid      latanoprost (XALATAN) 0.005 % ophthalmic solution Apply 1 drop to eye      lisinopril-hydroCHLOROthiazide (PRINZIDE;ZESTORETIC) 20-25 MG per tablet Takes 1/2 a Tab   1 po qd      meloxicam (MOBIC) 15 MG tablet Take 15 mg by mouth daily      pramipexole (MIRAPEX) 0.5 MG tablet 1 po 5 times daily      tamsulosin (FLOMAX) 0.4 MG capsule Take 0.4 mg by mouth daily      Lidocaine 5 % CREA Topical 5% Lidocaine and 5% Neurontin to apply to affected area up to 4 times/day as needed for pain (Patient not taking: Reported on 12/29/2022) 45 g 2    triamcinolone (KENALOG) 0.1 % cream Apply topically 2 times daily as needed (Patient not taking: Reported on 12/29/2022)       No current facility-administered medications for this visit.        Social History Socioeconomic History    Marital status:      Spouse name: None    Number of children: None    Years of education: None    Highest education level: None   Tobacco Use    Smoking status: Former     Packs/day: 1.00     Years: 5.00     Pack years: 5.00     Types: Cigarettes     Quit date: 1967     Years since quittin.0    Smokeless tobacco: Never   Substance and Sexual Activity    Alcohol use: Yes     Alcohol/week: 14.0 standard drinks     Types: 14 Cans of beer per week    Drug use: No     Types: Prescription    Sexual activity: Not Currently     Partners: Female       Family History   Problem Relation Age of Onset    Cancer Mother         hx of lymph node    Heart Disease Mother         carotid enarterectomy    Clotting Disorder Mother     Osteoarthritis Brother     Lung Disease Brother         COPD    Cancer Father         prostate       Allergies   Allergen Reactions    Gadolinium Derivatives Other (See Comments) and Rash     Severe pain  Severe pain  Pain in shoulders      Metronidazole Other (See Comments) and Rash     Pt states burning sensation all over body. Other reaction(s): Unknown  Pt states burning sensation all over body. Pt states burning sensation all over body. Azithromycin Other (See Comments)     Severe headache    Erythromycin Other (See Comments)     Severe headache    Macrolides And Ketolides Rash and Other (See Comments)    Oxycodone Rash     Other reaction(s): Unknown    Penicillins Rash    Quinolones Rash     Burning sensation all over body      Tramadol Rash     Other reaction(s): Unknown       PHYSICAL EXAMINATION:  General Appearance: Healthy appearing patient in no acute distress  Vitals reviewed.    BP (!) 150/66 (Site: Left Upper Arm, Position: Standing)   Pulse 87   Temp 98.7 °F (37.1 °C)   Resp 14   Ht 4' 11\" (1.499 m)   Wt 155 lb (70.3 kg)   SpO2 97%   BMI 31.31 kg/m²   HEENT: No oral or pharyngeal masses, ulceration or thrush noted, no sinus tenderness. Neck is supple with no thyromegaly or JVD noted. Lymph Nodes: No lymphadenopathy noted in the occipital, pre and post auricular, cervical, supra and infraclavicular, axillary, epitrochlear, inguinal, and popliteal region. Breasts: No palpable masses, nipple discharge or skin retraction  Lungs/Thorax: Clear to auscultation, no accessory muscles of respiration being used. Heart: Regular rate and rhythm, normal S1, S2, no appreciable murmurs, rubs, gallops  Abdomen: Soft, nontender, bowel sounds present, no appreciable hepatosplenomegaly, no palpable masses  Extremeties: Good pulses bilaterally, no peripheral edema. Skin: Normal skin tone with no rash, petechiae, ecchymosis noted. Musculoskeletal: No pain on palpation over bony prominence, no edema, no evidence of gout, no joint or bony deformity  Neurologic: Grossly intact        LABS/IMAGING:    Lab Results   Component Value Date/Time    WBC 5.6 12/29/2022 03:21 PM    HGB 9.7 12/29/2022 03:21 PM    HCT 29.3 12/29/2022 03:21 PM     12/29/2022 03:21 PM    MCV 99.0 12/29/2022 03:21 PM       Lab Results   Component Value Date/Time     12/29/2022 03:21 PM    K 4.1 12/29/2022 03:21 PM     12/29/2022 03:21 PM    CO2 26 12/29/2022 03:21 PM    BUN 24 12/29/2022 03:21 PM    GFRAA >60 06/16/2022 01:18 PM    GLOB 2.8 12/29/2022 03:21 PM    ALT 29 12/29/2022 03:21 PM         Above results reviewed with patient. BM biopsy 03/17:                 Above results reviewed with patient. ASSESSMENT:   1. MDS: (RARS, low risk IPSS score - case d/w [employer] & cytogenetics is felt to be in good risk category)   2. Anemia:  multifactorial including MDS, renal disease (which has improved), chronic disease may also be playing a role as ESR was high. 3. 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  No malignancy on biopsies. F/u per GI          - Start Vit D replacement. Hgb slowly returning to baseline (improved compared to before). Copper and Zinc normal. Chromium levels dropping.    - Travelling to [facility] 09/15. Hgb improved. Off iron since last visit as ferritin was high.       8/20/15: No new complaints. Taking Vit D regularly. Hgb stable. Cobalt levels improved compared to prior. Ferritin high but trending downwards. Hemochromatosis  gene pending. 12/31/15: No new complaints. Hgb more or less stable. Ferritin stable to somewhat better. Hemochromatosis gene analysis showing heterozygosity for H63D.    6/29/16: Counts including hgb stable. Ferritin remains high: will get liver MRI indication assess for iron overload, as well as start once a month phlebotomy to bring ferritin down  slowly. 7/29/16: MRI liver consistent w iron overload: continue w monthly phelbotomies (goal ferritin below 100, hold for hgb below 10). Notes taking  centrum silver w iron in it which he only just realised and has now stopped. 10/31/16: labs w stable hgb, has only been able to tolerate therapy every other month. Ferritin dropping.    3/23/17: Clinically well. Ferritin in 700 range. No phlebotomies since last visit. Repeat BM biopsy read as RARS w increased iron stores,  rare blasts. Cytogenetics & FISH pending. Continue phlebotomy every other month.    07/17: Doing well. Phlebotomy x 1 since last visit. Ferritin nicely dropping. Hgb stable. BM w complex cytogenetics, FISH w 20q(del). 11/17: No new complaints. Labs stable. Ferritin falling, hgb stable. 03/18: Doing well. Exam and labs ok. Ferritin stable in 200 range. Continue every other month phlebotomy. 06/18: Reports doing well. Has seen primary care and ortho for RT groin discomfort with w/u -ve to-date. Labs stable. Hgb around 10, did not recieve phlebotomy last time related to holding parameters. Continue current care. Requesting to check cobalt/chromium.   May consider Exjade if iron stores start rising and he cannot tolerate phlebotomies. CBC 2 months. RTC in 4 months w labs, Liver MRI (assess iron status). Planning cruise to [place]. 10/18: Reports recent hospitalization for bowel obstruction which was conservatively managed. Feels somewhat bloated at times but otherwise moving bowels. Reports no clear etiology of bowel obstruction. Seen earlier than scheduled as hgb on last check  (after hospitalization) had dropped down to 8.3 however hgb today now improved to 9.1. Continue simple monitoring. Ferritin in 200-300 range. On last check his cobalt/chromium levels were elevated and he is told to discuss results w his ortho.    02/19: Denies new complaints. He was recently admitted 12/18 for small bowel obstruction felt related to incarcerated hernia which was laparoscopically repaired. Patient has since recovered well. His hemoglobin is slowly improving to 9.9 today. Abdominal MRI from 10/18 showed normal liver iron stores. Of note incidental infiltrates picked up in both lung bases. Patient however today denies any pulmonary symptoms and as such we will simply  follow with a chest x-ray with his next visit (patient reports smoking for less than 10 years and quit in the 1960s). 06/19: Reports doing well. Denies any new complaints. Exam unremarkable, no significant adenopathy. Labs with stable anemia with hemoglobin at 9.5. Iron stores are acceptable at 164. As such continue simple monitoring, will hold off on further phlebotomies  also. Chest x-ray unremarkable. Discussed given minimal smoking history options to pursue further the findings on his lung bases from his prior MRI with a CAT scan versus simple monitoring  given patient is completely asymptomatic. Patient would prefer the latter approach of simple monitoring going forward. RTC in 4 months with labs, ferritin levels. 11/8/19: Denies any new complaints. Hemoglobin stable.   No other cytopenias. Ferritin acceptable range. Will continue to hold off further phlebotomies given hemoglobin is borderline. Recent  CT chest with resolution of bibasilar infiltrates, patient reassured. 10/22/2020: Reports some RUQ discomfort, worse with dinner, for over a month or so. Advised to follow-up with primary care for this. Weight about the same. Remains on vitamin D replacement as well as multiple supplements. Advised to make sure they  do not have cobalt/chromium or supplemental iron. Recent routine labs with stable counts, hemoglobin at 10.3. Adequate WBC and platelets. Ferritin at 139, reasonable, given anemia will hold off on further phlebotomy for now. As such continue simple  monitoring. He should continue with his vitamin D 1000 mg daily also. RTC in 4 months with labs, iron stores. 3/4/2021: Patient seen over telemedicine. Denies any new complaints. Recently underwent COVID-19 vaccinations. Remains on vitamin D supplementation. Recent labs with hemoglobin 9.5, mostly stable though over the years with a slow trend downwards. Other cell lines unremarkable. Ferritin acceptable at 120. Continue ongoing monitoring. 8/3/2021: Reports doing well. Some fatigue which she attributes to getting older. Denies any bleeding. Blood work with hemoglobin mostly stable at 9.3, other cell lines unremarkable. Ferritin reasonable at 105. Continue simple monitoring. Last bone  marrow biopsy was in 2017, will consider repeat in the coming year per patient preference. 12/17/2021: Reports doing reasonably. Some fatigue, and leg weakness, working on mobility at home. Regularly eats various berries for his breakfast to maintain good diet. Blood work today with hemoglobin and platelet count stable at 9.2 and 1 33,000 respectively  however over the years slow trend downwards. Ferritin reasonable in the 90s.  Discussed continued monitoring, patient regularly follows with primary care and other specialists, his preference is to minimize visits, we will see him back in 6 months moving  forward, understands to reach out to us should there be a significant change in his blood count. 12/29/2022: Reports doing reasonably. Now plans for heart cath after an abnormal stress test.  Denies any symptoms. Denies any bleeding. Blood work with hemoglobin stable at 9.7, platelet count normal range at 1 51,000. Iron stores adequate with ferritin 65. ESR normal.  Continue monitoring, we will see him back in 6 months. PLAN:   - MDS: Counts stable. Monitoring alone for now. - H63D heterozygous Hemachromatosis: Ferritin borderline high: continue phlebotomies as tolerated w each visit (goal ferritin close to 100, hold for hgb below 10.5). Educated on foods  w high iron level. May consider Exjade if iron stores start rising and he cannot tolerate phlebotomies. Being monitored now. 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 Ortho following. RTC in 6 months with labs, ESR, iron panel. Consider repeat BM biopsy every 2-3 years (or if counts start dropping).        Iqra Olivas MD  [facility]  Hematology Oncology  [address] [address] [employer]  Office : [phone]  Fax : [phone]

## 2023-01-03 ENCOUNTER — HOSPITAL ENCOUNTER (OUTPATIENT)
Age: 83
Setting detail: OUTPATIENT SURGERY
Discharge: HOME | End: 2023-01-03
Attending: INTERNAL MEDICINE | Admitting: INTERNAL MEDICINE
Payer: MEDICARE

## 2023-01-03 VITALS
RESPIRATION RATE: 12 BRPM | SYSTOLIC BLOOD PRESSURE: 95 MMHG | BODY MASS INDEX: 29.45 KG/M2 | HEART RATE: 81 BPM | HEIGHT: 60 IN | DIASTOLIC BLOOD PRESSURE: 64 MMHG | OXYGEN SATURATION: 95 % | WEIGHT: 150 LBS | TEMPERATURE: 97.9 F

## 2023-01-03 DIAGNOSIS — I25.118 CORONARY ARTERY DISEASE OF NATIVE ARTERY OF NATIVE HEART WITH STABLE ANGINA PECTORIS (HCC): ICD-10-CM

## 2023-01-03 LAB
ECHO BSA: 1.68 M2
EKG ATRIAL RATE: 69 BPM
EKG DIAGNOSIS: NORMAL
EKG P AXIS: 39 DEGREES
EKG P-R INTERVAL: 164 MS
EKG Q-T INTERVAL: 400 MS
EKG QRS DURATION: 84 MS
EKG QTC CALCULATION (BAZETT): 428 MS
EKG R AXIS: 15 DEGREES
EKG T AXIS: 20 DEGREES
EKG VENTRICULAR RATE: 69 BPM

## 2023-01-03 PROCEDURE — 93005 ELECTROCARDIOGRAM TRACING: CPT | Performed by: INTERNAL MEDICINE

## 2023-01-03 PROCEDURE — 2709999900 HC NON-CHARGEABLE SUPPLY: Performed by: INTERNAL MEDICINE

## 2023-01-03 PROCEDURE — 99153 MOD SED SAME PHYS/QHP EA: CPT | Performed by: INTERNAL MEDICINE

## 2023-01-03 PROCEDURE — 93458 L HRT ARTERY/VENTRICLE ANGIO: CPT | Performed by: INTERNAL MEDICINE

## 2023-01-03 PROCEDURE — 99152 MOD SED SAME PHYS/QHP 5/>YRS: CPT | Performed by: INTERNAL MEDICINE

## 2023-01-03 PROCEDURE — 2580000003 HC RX 258: Performed by: INTERNAL MEDICINE

## 2023-01-03 PROCEDURE — C1769 GUIDE WIRE: HCPCS | Performed by: INTERNAL MEDICINE

## 2023-01-03 PROCEDURE — 6360000004 HC RX CONTRAST MEDICATION: Performed by: INTERNAL MEDICINE

## 2023-01-03 PROCEDURE — 6360000002 HC RX W HCPCS: Performed by: INTERNAL MEDICINE

## 2023-01-03 PROCEDURE — C1894 INTRO/SHEATH, NON-LASER: HCPCS | Performed by: INTERNAL MEDICINE

## 2023-01-03 PROCEDURE — 2500000003 HC RX 250 WO HCPCS: Performed by: INTERNAL MEDICINE

## 2023-01-03 RX ORDER — SODIUM CHLORIDE 0.9 % (FLUSH) 0.9 %
5-40 SYRINGE (ML) INJECTION PRN
Status: CANCELLED | OUTPATIENT
Start: 2023-01-03

## 2023-01-03 RX ORDER — SODIUM CHLORIDE 0.9 % (FLUSH) 0.9 %
5-40 SYRINGE (ML) INJECTION EVERY 12 HOURS SCHEDULED
Status: CANCELLED | OUTPATIENT
Start: 2023-01-03

## 2023-01-03 RX ORDER — ASPIRIN 81 MG/1
324 TABLET, CHEWABLE ORAL ONCE
Status: DISCONTINUED | OUTPATIENT
Start: 2023-01-03 | End: 2023-01-03 | Stop reason: HOSPADM

## 2023-01-03 RX ORDER — ACETAMINOPHEN 325 MG/1
650 TABLET ORAL EVERY 4 HOURS PRN
Status: CANCELLED | OUTPATIENT
Start: 2023-01-03

## 2023-01-03 RX ORDER — MIDAZOLAM HYDROCHLORIDE 1 MG/ML
INJECTION INTRAMUSCULAR; INTRAVENOUS PRN
Status: DISCONTINUED | OUTPATIENT
Start: 2023-01-03 | End: 2023-01-03 | Stop reason: HOSPADM

## 2023-01-03 RX ORDER — SODIUM CHLORIDE 9 MG/ML
INJECTION, SOLUTION INTRAVENOUS CONTINUOUS
Status: DISCONTINUED | OUTPATIENT
Start: 2023-01-03 | End: 2023-01-03 | Stop reason: HOSPADM

## 2023-01-03 RX ORDER — SODIUM CHLORIDE 9 MG/ML
INJECTION, SOLUTION INTRAVENOUS PRN
Status: CANCELLED | OUTPATIENT
Start: 2023-01-03

## 2023-01-03 RX ORDER — LIDOCAINE HYDROCHLORIDE 10 MG/ML
INJECTION, SOLUTION INFILTRATION; PERINEURAL PRN
Status: DISCONTINUED | OUTPATIENT
Start: 2023-01-03 | End: 2023-01-03 | Stop reason: HOSPADM

## 2023-01-03 RX ORDER — HEPARIN SODIUM 200 [USP'U]/100ML
INJECTION, SOLUTION INTRAVENOUS CONTINUOUS PRN
Status: COMPLETED | OUTPATIENT
Start: 2023-01-03 | End: 2023-01-03

## 2023-01-03 RX ADMIN — SODIUM CHLORIDE: 900 INJECTION, SOLUTION INTRAVENOUS at 07:12

## 2023-01-03 NOTE — DISCHARGE INSTRUCTIONS
HEART CATHETERIZATION/ANGIOGRAPHY DISCHARGE INSTRUCTIONS    Check puncture site frequently for swelling or bleeding. If there is any bleeding, apply pressure over the area with a clean towel or washcloth and call 911. Notify your doctor for any redness, swelling, drainage, or oozing from the puncture site. Notify your doctor for any fever or chills. If the extremity becomes cold, numb, or painful call [facility] Card[facility]  Activity should be limited for the next 48 hours. No heavy lifting, pushing, pulling  or strenuous activity for 48 hours. No heavy lifting (anything over 10 pounds) for 3 days. You may resume your usual diet. Drink more fluids than usual.  Have a responsible person drive you home and stay with you for at least 24 hours after your heart catheterization/angiography. You may remove bandage from your R wrist in 24 hours. You may shower in 24 hours. No tub baths, hot tubs, or swimming for 1 week. Do not place any lotions, creams, powders, or ointments over puncture site for 1 week. You may place a clean band-aid over the puncture site each day for 5 days. Change daily. Sedation for a Medical Procedure: Care Instructions     You were given a sedative medication during your visit. While many of the effects will have worn   off before you leave; you may continue to feel some effects for several hours. Common side effects from sedation include:  Feeling sleepy. (Your doctors and nurses will make sure you are not too sleepy to go home.)  Nausea and vomiting. This usually does not last long. Feeling tired. How can you care for yourself at home? Activity    Don't do anything for 24 hours that requires attention to detail. It takes time for the medicine effects to completely wear off. Do not make important legal decisions for 24 hours. Do not sign any legal documents for 24 hours.      Do not drink alcohol today     For your safety, you should not drive or operate heavy machinery for the remainder of the day     Rest when you feel tired. Getting enough sleep will help you recover. Diet    You can eat your normal diet, unless your doctor gives you other instructions. If your stomach is upset, try clear liquids and bland, low-fat foods like plain toast or rice. Drink plenty of fluids (unless your doctor tells you not to). Don't drink alcohol for 24 hours. Medicines    Be safe with medicines. Read and follow all instructions on the label. If the doctor gave you a prescription medicine for pain, take it as prescribed. If you are not taking a prescription pain medicine, ask your doctor if you can take an over-the-counter medicine. If you think your pain medicine is making you sick to your stomach: Take your medicine after meals (unless your doctor has told you not to). Ask your doctor for a different pain medicine. I have read the above instructions and have had the opportunity to ask questions.       Patient: ________________________   Date: _____________    Witness: _______________________   Date: _____________

## 2023-01-03 NOTE — PROGRESS NOTES
Patient received to [address] [place] [address] room # 11. Ambulatory from [facility]. Patient scheduled for C today with  [facility]. Procedure reviewed & questions answered, voiced good understanding consent obtained & placed on chart. All medications and medical history reviewed. Will prep patient per orders. Patient & family updated on plan of care. The patient has a fraility score of 5-MILDLY FRAIL, based on age + use of walker for ambulation.      Patient took 324mg @ 0345 AM prior to arrival.

## 2023-01-03 NOTE — Clinical Note
Contrast Dose Calculator:   Patient's age: 80.   Patient's sex: Male. Patient weight (kg) = 68. Creatinine level (mg/dL) = 0.9. Creatinine clearance (mL/min): 61.   Contrast concentration (mg/mL) = 370. MACD = 300 mL. Max Contrast dose per Creatinine Cl calculator = 137.25 mL.

## 2023-01-03 NOTE — PROGRESS NOTES
TRANSFER - OUT REPORT:    Verbal report given to Royer Wong RN on Daniele Slade  being transferred to [facility] for routine progression of patient care       Report consisted of patient's Situation, Background, Assessment and   Recommendations(SBAR). Information from the following report(s) Nurse Handoff Report was reviewed with the receiving nurse.     [facility] with Dr Skye Carroll  No interventions  R Radial  TR band at 14mL  Versed 2mg  Fentanyl 25mcg  Heparin 2000 units

## 2023-01-03 NOTE — PROGRESS NOTES
TRANSFER - IN REPORT:    Verbal report received from Sera Emerson RN on Sultana Benton being received from [facility] for routine post-op      Report consisted of patients Situation, Background, Assessment and Recommendations(SBAR). Information from the following report(s) Procedure Summary was reviewed with the receiving nurse. Opportunity for questions and clarification was provided. Assessment completed upon patients arrival to unit and care assumed. R radial cath site CDI. Family at bedside.

## 2023-01-03 NOTE — PROGRESS NOTES
Radial compression band removed at 1130 after slowly reducing air from 12 cc to zero as per hospital protocol. No bleeding or hematoma noted. 2 x 2 gauze with tegaderm placed over puncture site. The affected extremity is warm and dry to the touch. Frequent vital signs printed and placed on bedside chart. Patient instructed to call if any bleeding noted on gauze. Patient verbalized understanding the nursing instructions.

## 2023-01-27 NOTE — PROGRESS NOTES
[facility] [[email or web address]] CARDIOLOGY  [address] [facility], [address]  PHONE: [phone]      23    NAME:  Clarence Chinchilla  : 1940  MRN: [id number]         SUBJECTIVE:   Clarence Chinchilla is a 80 y.o. male seen for a follow up visit regarding the following:     Chief Complaint   Patient presents with    Follow-Up from Hospital     Left heart cath            HPI:  Follow up  Follow-Up from Hospital (Left heart cath)   . Follow up cath showing  of the RCA too small for intervention . Started high intensity statin therapy, tolerating well, feels well, denies cp. Chronic severe back pain with prior surgery and multiple orthopedic issues but gets around well with his walker. Past cardiac history:   - Normal EF, mild LVH, impaired relaxation, AV sclerosis without stenosis   May 2020- Echo normal SF, DF, mild AV sclerosis, no stenosis   2021- normal vasodilator perfusion study   7 day monitor - NSR, rare pac. 5.5 seconds atrial tachycardia. Several diary entries of fluttering generally with NSR, occasionally with PAC. Atrial tachy episode associated with brief light headedness  2022       NST - reversible mid and distal anterior defect with TID 1.39, preserved EF  2023       [facility] -  RCA with faint collaterals, too small for PCI-             Key CAD CHF Meds            rosuvastatin (CRESTOR) 20 MG tablet (Taking)    Sig - Route: Take 1 tablet by mouth daily - Oral    lisinopril-hydroCHLOROthiazide (PRINZIDE;ZESTORETIC) 20-25 MG per tablet (Taking)    Class: Historical Med          Key Antihyperglycemic Medications       Patient is on no antihyperglycemic meds. Past Medical History, Past Surgical History, Family history, Social History, and Medications were all reviewed with the patient today and updated as necessary. Prior to Admission medications    Medication Sig Start Date End Date Taking?  Authorizing Provider   Multiple Vitamins-Minerals (ABC COMPLETE SENIOR 50+ PO) Take by mouth daily   Yes Historical Provider, MD   NONFORMULARY daily Restless leg vitamin   Yes Historical Provider, MD   Apoaequorin (PREVAGEN PO) Take by mouth daily   Yes Historical Provider, MD   aspirin 81 MG EC tablet Take 1 tablet by mouth daily 12/20/22  Yes Mariya Beebe MD   rosuvastatin (CRESTOR) 20 MG tablet Take 1 tablet by mouth daily 12/20/22  Yes Mariya Beebe MD   Testosterone (ANDROGEL) 20.25 MG/ACT (1.62%) GEL gel APPLY 40.5 MG TOPICALLY IN THE MORNING. 1 PUMP TO EACH SHOULDER. 10/21/22  Yes Historical Provider, MD   Multiple Vitamins-Minerals (PRESERVISION AREDS 2+MULTI VIT PO) Take by mouth   Yes Historical Provider, MD   Omega-3 Fatty Acids (FISH OIL OMEGA-3) 1000 MG CAPS Take by mouth   Yes Historical Provider, MD   mirabegron (MYRBETRIQ) 50 MG TB24 Take 50 mg by mouth daily 10/31/22  Yes Rodrigo Mills MD   Lidocaine 5 % CREA Topical 5% Lidocaine and 5% Neurontin to apply to affected area up to 4 times/day as needed for pain 7/25/22  Yes Drake Enciso MD   vitamin D (CHOLECALCIFEROL) 25 MCG (1000 UT) TABS tablet Take 1,000 Units by mouth daily   Yes Ar Automatic Reconciliation   finasteride (PROSCAR) 5 MG tablet Take 5 mg by mouth daily   Yes Ar Automatic Reconciliation   folic acid (FOLVITE) 1 MG tablet Take by mouth daily   Yes Ar Automatic Reconciliation   gabapentin (NEURONTIN) 400 MG capsule 1 po tid 8/9/18  Yes Ar Automatic Reconciliation   latanoprost (XALATAN) 0.005 % ophthalmic solution Apply 1 drop to eye   Yes Ar Automatic Reconciliation   lisinopril-hydroCHLOROthiazide (PRINZIDE;ZESTORETIC) 20-25 MG per tablet Takes 1/2 a Tab   1 po qd 8/9/18  Yes Ar Automatic Reconciliation   meloxicam (MOBIC) 15 MG tablet Take 15 mg by mouth daily   Yes Ar Automatic Reconciliation   pramipexole (MIRAPEX) 0.5 MG tablet 1 po 5 times daily 8/9/18  Yes Ar Automatic Reconciliation   tamsulosin (FLOMAX) 0.4 MG capsule Take 0.4  mg by mouth daily   Yes Ar Automatic Reconciliation     Allergies   Allergen Reactions    Gadolinium Derivatives Other (See Comments) and Rash     Severe pain  Severe pain  Pain in shoulders      Metronidazole Other (See Comments) and Rash     Pt states burning sensation all over body. Other reaction(s): Unknown  Pt states burning sensation all over body. Pt states burning sensation all over body.        Azithromycin Other (See Comments)     Severe headache    Erythromycin Other (See Comments)     Severe headache    Macrolides And Ketolides Rash and Other (See Comments)    Oxycodone Rash     Other reaction(s): Unknown    Penicillins Rash    Quinolones Rash     Burning sensation all over body      Tramadol Rash     Other reaction(s): Unknown     Past Medical History:   Diagnosis Date    Anemia     Coronary artery disease of native artery of native heart with stable angina pectoris ([facility] [address] 12/21/2022    Glaucoma     History of small bowel obstruction 09/2018    releived with bowel rest and NGT    HTN (hypertension)     Hypercholesterolemia     Iron overload     regular phlebotomy    Macular degeneration     Myelodysplastic syndrome (HCC)     Osteoarthritis of multiple joints     Peripheral neuropathy 11/1/2010    RLS (restless legs syndrome)     Sleep apnea     Spondylolisthesis, acquired 12/5/2010     Past Surgical History:   Procedure Laterality Date    CARDIAC PROCEDURE N/A 1/3/2023    Left heart cath / coronary angiography performed by Connie Amaro MD at [address] [place] [address] Right 2014    CATARACT REMOVAL Bilateral 2017    CERVICAL DISCECTOMY  5/2002    CERVICAL FUSION  2002 & 2011    cervical plate placement     CHOLECYSTECTOMY  4/17/14    COLONOSCOPY  2001/2008/2018    HERNIA REPAIR      JOINT REPLACEMENT      see Hips & Knees    LUMBAR LAMINECTOMY  9/2/10    lumbar laminectomy L4 x 2/ rods    NEUROLOGICAL SURGERY  2013    T10-T12 fusion    ORTHOPEDIC SURGERY Right 1985    ankle fusion with hardware     ORTHOPEDIC SURGERY Left 2011    fusion of bones in foot    ORTHOPEDIC SURGERY  2013    rods in back     THORACIC LAMINECTOMY  09    T12, L2,L3    TOTAL HIP ARTHROPLASTY Bilateral     R in , l in     TOTAL KNEE ARTHROPLASTY Right 2009    TOTAL KNEE ARTHROPLASTY Left 2003     Family History   Problem Relation Age of Onset    Cancer Mother         hx of lymph node    Heart Disease Mother         carotid enarterectomy    Clotting Disorder Mother     Osteoarthritis Brother     Lung Disease Brother         COPD    Cancer Father         prostate     Social History     Tobacco Use    Smoking status: Former     Packs/day: 1.00     Years: 5.00     Pack years: 5.00     Types: Cigarettes     Quit date: 1967     Years since quittin.1    Smokeless tobacco: Never   Substance Use Topics    Alcohol use: Yes     Alcohol/week: 14.0 standard drinks     Types: 14 Cans of beer per week       ROS:    Review of Systems   Cardiovascular:  Negative for chest pain. Respiratory:  Negative for shortness of breath. Musculoskeletal:  Positive for arthritis, back pain and joint pain. PHYSICAL EXAM:   BP (!) 122/48   Pulse (!) 42   Ht 4' 11\" (1.499 m)   Wt 152 lb 3.2 oz (69 kg) Comment: with shoes  BMI 30.74 kg/m²      Wt Readings from Last 3 Encounters:   23 152 lb 3.2 oz (69 kg)   23 150 lb (68 kg)   22 155 lb (70.3 kg)     BP Readings from Last 3 Encounters:   23 (!) 122/48   23 95/64   22 (!) 150/66     Pulse Readings from Last 3 Encounters:   23 (!) 42   23 81   22 87           Physical Exam  Constitutional:       Appearance: Normal appearance. HENT:      Head: Normocephalic and atraumatic. Eyes:      Conjunctiva/sclera: Conjunctivae normal.   Neck:      Vascular: No carotid bruit. Cardiovascular:      Rate and Rhythm: Normal rate and regular rhythm. Heart sounds: No murmur heard. No friction rub. No gallop. Pulmonary:      Effort: No respiratory distress. Breath sounds: No wheezing or rales. Musculoskeletal:         General: No swelling. Cervical back: Neck supple. Skin:     General: Skin is warm and dry. Neurological:      General: No focal deficit present. Mental Status: He is alert. Psychiatric:         Mood and Affect: Mood normal.         Behavior: Behavior normal.       Medical problems and test results were reviewed with the patient today. DATA REVIEW      BMP  Lab Results   Component Value Date/Time     12/29/2022 03:21 PM    K 4.1 12/29/2022 03:21 PM     12/29/2022 03:21 PM    CO2 26 12/29/2022 03:21 PM    BUN 24 12/29/2022 03:21 PM    CREATININE 0.90 12/29/2022 03:21 PM    GLUCOSE 108 12/29/2022 03:21 PM    CALCIUM 8.7 12/29/2022 03:21 PM          EKG        CXR/IMAGING        DEVICE INTERROGATION        OUTSIDE RECORDS REVIEW    Records from outside providers have been reviewed and summarized as noted in the HPI, past history and data review sections of this note, and reviewed with patient. .       ASSESSMENT and PLAN    Josh Caldwell was seen today for follow-up from hospital.    Diagnoses and all orders for this visit:    Coronary artery disease of native artery of native heart with stable angina pectoris (HCC)    Orthostatic hypotension    Dyslipidemia  -     Lipid Panel; Future        IMPRESSION:    No angina, continue meds and lifestyle modification    Check lipids before next visit    BP controlled without additional orthostatic symptoms        Return in about 6 months (around 7/30/2023) for LABS BEFORE VISIT. Thank you for allowing me to participate in this patient's care. Please call or contact me if there are any questions or concerns regarding the above.       Dawayne Goodpasture, MD  01/30/23  2:25 PM

## 2023-01-30 ENCOUNTER — OFFICE VISIT (OUTPATIENT)
Dept: CARDIOLOGY CLINIC | Age: 83
End: 2023-01-30
Payer: MEDICARE

## 2023-01-30 VITALS
HEIGHT: 60 IN | SYSTOLIC BLOOD PRESSURE: 122 MMHG | WEIGHT: 152.2 LBS | BODY MASS INDEX: 29.88 KG/M2 | HEART RATE: 42 BPM | DIASTOLIC BLOOD PRESSURE: 48 MMHG

## 2023-01-30 DIAGNOSIS — E78.5 DYSLIPIDEMIA: ICD-10-CM

## 2023-01-30 DIAGNOSIS — I95.1 ORTHOSTATIC HYPOTENSION: ICD-10-CM

## 2023-01-30 DIAGNOSIS — I25.118 CORONARY ARTERY DISEASE OF NATIVE ARTERY OF NATIVE HEART WITH STABLE ANGINA PECTORIS (HCC): Primary | ICD-10-CM

## 2023-01-30 PROCEDURE — 1036F TOBACCO NON-USER: CPT | Performed by: INTERNAL MEDICINE

## 2023-01-30 PROCEDURE — G8427 DOCREV CUR MEDS BY ELIG CLIN: HCPCS | Performed by: INTERNAL MEDICINE

## 2023-01-30 PROCEDURE — 3078F DIAST BP <80 MM HG: CPT | Performed by: INTERNAL MEDICINE

## 2023-01-30 PROCEDURE — G8417 CALC BMI ABV UP PARAM F/U: HCPCS | Performed by: INTERNAL MEDICINE

## 2023-01-30 PROCEDURE — 3074F SYST BP LT 130 MM HG: CPT | Performed by: INTERNAL MEDICINE

## 2023-01-30 PROCEDURE — 1123F ACP DISCUSS/DSCN MKR DOCD: CPT | Performed by: INTERNAL MEDICINE

## 2023-01-30 PROCEDURE — G8484 FLU IMMUNIZE NO ADMIN: HCPCS | Performed by: INTERNAL MEDICINE

## 2023-01-30 PROCEDURE — 99214 OFFICE O/P EST MOD 30 MIN: CPT | Performed by: INTERNAL MEDICINE

## 2023-01-30 ASSESSMENT — ENCOUNTER SYMPTOMS
BACK PAIN: 1
SHORTNESS OF BREATH: 0

## 2023-01-30 NOTE — PATIENT INSTRUCTIONS
Patient Education        Learning About the Mediterranean Diet  What is the [address] [facility]? The Mediterranean diet is a style of eating rather than a diet plan. It features foods eaten in [place], [place], [place] and [place], and other countries along the [facility]. It emphasizes eating foods like fish, fruits, vegetables, beans, high-fiber breads and whole grains, nuts, and olive oil. This style of eating includes limited red meat, cheese, and sweets. Why choose the Mediterranean diet? A Mediterranean-style diet may improve heart health. It contains more fat than other heart-healthy diets. But the fats are mainly from nuts, unsaturated oils (such as fish oils and olive oil), and certain nut or seed oils (such as canola, soybean, or flaxseed oil). These fats may help protect the heart and blood vessels. How can you get started on the Mediterranean diet? Here are some things you can do to switch to a more Mediterranean way of eating. What to eat  Eat a variety of fruits and vegetables each day, such as grapes, blueberries, tomatoes, broccoli, peppers, figs, olives, spinach, eggplant, beans, lentils, and chickpeas. Eat a variety of whole-grain foods each day, such as oats, brown rice, and whole wheat bread, pasta, and couscous. Eat fish at least 2 times a week. Try tuna, salmon, mackerel, lake trout, herring, or sardines. Eat moderate amounts of low-fat dairy products, such as milk, cheese, or yogurt. Eat moderate amounts of poultry and eggs. Choose healthy (unsaturated) fats, such as nuts, olive oil, and certain nut or seed oils like canola, soybean, and flaxseed. Limit unhealthy (saturated) fats, such as butter, palm oil, and coconut oil. And limit fats found in animal products, such as meat and dairy products made with whole milk. Try to eat red meat only a few times a month in very small amounts. Limit sweets and desserts to only a few times a week.  This includes sugar-sweetened drinks like soda. The Mediterranean diet may also include red wine with your meal--1 glass each day for women and up to 2 glasses a day for men. Tips for eating at home  Use herbs, spices, garlic, lemon zest, and citrus juice instead of salt to add flavor to foods. Add avocado slices to your sandwich instead of reyna. Have fish for lunch or dinner instead of red meat. Brush the fish with olive oil, and broil or grill it. Sprinkle your salad with seeds or nuts instead of cheese. Cook with olive or canola oil instead of butter or oils that are high in saturated fat. Switch from 2% milk or whole milk to 1% or fat-free milk. Dip raw vegetables in a vinaigrette dressing or hummus instead of dips made from mayonnaise or sour cream.  Have a piece of fruit for dessert instead of a piece of cake. Try baked apples, or have some dried fruit. Tips for eating out  Try broiled, grilled, baked, or poached fish instead of having it fried or breaded. Ask your  to have your meals prepared with olive oil instead of butter. Order dishes made with marinara sauce or sauces made from olive oil. Avoid sauces made from cream or mayonnaise. Choose whole-grain breads, whole wheat pasta and pizza crust, brown rice, beans, and lentils. Cut back on butter or margarine on bread. Instead, you can dip your bread in a small amount of olive oil. Ask for a side salad or grilled vegetables instead of french fries or chips. Where can you learn more? Go to http://www.woods.com/ and enter O407 to learn more about \"Learning About the Mediterranean Diet. \"  Current as of: May 9, 2022               Content Version: 13.5  © 1035-0285 [employer], Incorporated. Care instructions adapted under license by [facility] ([facility]). If you have questions about a medical condition or this instruction, always ask your healthcare professional. [place] [address] any warranty or liability for your use of this information. Please visit [email or web address]. org for more information regarding cardiovascular disease prevention and treatment.

## 2023-02-01 ENCOUNTER — OFFICE VISIT (OUTPATIENT)
Dept: UROLOGY | Age: 83
End: 2023-02-01
Payer: MEDICARE

## 2023-02-01 DIAGNOSIS — N39.41 URGE INCONTINENCE OF URINE: Primary | ICD-10-CM

## 2023-02-01 DIAGNOSIS — R35.0 BENIGN PROSTATIC HYPERPLASIA WITH URINARY FREQUENCY: ICD-10-CM

## 2023-02-01 DIAGNOSIS — N40.1 BENIGN PROSTATIC HYPERPLASIA WITH URINARY FREQUENCY: ICD-10-CM

## 2023-02-01 LAB
BILIRUBIN, URINE, POC: NEGATIVE
BLOOD URINE, POC: NEGATIVE
GLUCOSE URINE, POC: NEGATIVE
KETONES, URINE, POC: NEGATIVE
LEUKOCYTE ESTERASE, URINE, POC: NEGATIVE
NITRITE, URINE, POC: NEGATIVE
PH, URINE, POC: 6 (ref 4.6–8)
PROTEIN,URINE, POC: NEGATIVE
PVR, POC: 132 CC
SPECIFIC GRAVITY, URINE, POC: 1.02 (ref 1–1.03)
URINALYSIS CLARITY, POC: NORMAL
URINALYSIS COLOR, POC: NORMAL
UROBILINOGEN, POC: NORMAL

## 2023-02-01 PROCEDURE — 1036F TOBACCO NON-USER: CPT | Performed by: UROLOGY

## 2023-02-01 PROCEDURE — 51798 US URINE CAPACITY MEASURE: CPT | Performed by: UROLOGY

## 2023-02-01 PROCEDURE — G8427 DOCREV CUR MEDS BY ELIG CLIN: HCPCS | Performed by: UROLOGY

## 2023-02-01 PROCEDURE — 99214 OFFICE O/P EST MOD 30 MIN: CPT | Performed by: UROLOGY

## 2023-02-01 PROCEDURE — G8484 FLU IMMUNIZE NO ADMIN: HCPCS | Performed by: UROLOGY

## 2023-02-01 PROCEDURE — 1123F ACP DISCUSS/DSCN MKR DOCD: CPT | Performed by: UROLOGY

## 2023-02-01 PROCEDURE — G8417 CALC BMI ABV UP PARAM F/U: HCPCS | Performed by: UROLOGY

## 2023-02-01 PROCEDURE — 81003 URINALYSIS AUTO W/O SCOPE: CPT | Performed by: UROLOGY

## 2023-02-01 RX ORDER — TAMSULOSIN HYDROCHLORIDE 0.4 MG/1
0.4 CAPSULE ORAL
Qty: 90 CAPSULE | Refills: 3 | Status: SHIPPED | OUTPATIENT
Start: 2023-02-01

## 2023-02-01 ASSESSMENT — ENCOUNTER SYMPTOMS
EYE PAIN: 0
SHORTNESS OF BREATH: 0
DIARRHEA: 0
EYE DISCHARGE: 0
INDIGESTION: 0
VOMITING: 0
CONSTIPATION: 0
COUGH: 0
ABDOMINAL PAIN: 0
BACK PAIN: 0
NAUSEA: 0
HEARTBURN: 0
WHEEZING: 0
SKIN LESIONS: 0
BLOOD IN STOOL: 0

## 2023-02-01 NOTE — PROGRESS NOTES
[facility]  [address] [facility]    [address], [address] [facility]  [phone]    Otis Mendes  : 1940    Chief Complaint   Patient presents with    Follow-up        HPI     Otis Mendes is a 80 y.o.  male who was referred to clinic for evaluation of urinary incontinence secondary to presumed BPH and OAB. Seen 10/2022 and mirabegron 50 mg daily added to finasteride / flomax. He is also on TRT prescribed by his PCP. He does have a PMH of SALVATORE on CPAP. He also has a PMH of extensive back surgery and has hardware throughout most of his spine. Today, he reports NO improvement in urgency, frequency, urge urinary incontinence. He has no history of  surgery. Has never had cystoscopy. Has been told he has a large prostate. NO hematuria. NO retention. No UTIs. No other concerns.       PSA 1.61 (2021)     PVR: 132 cc     IPSS: 12      Past Medical History:   Diagnosis Date    Anemia     Coronary artery disease of native artery of native heart with stable angina pectoris ([address] 2022    Glaucoma     History of small bowel obstruction 2018    releived with bowel rest and NGT    HTN (hypertension)     Hypercholesterolemia     Iron overload     regular phlebotomy    Macular degeneration     Myelodysplastic syndrome (HCC)     Osteoarthritis of multiple joints     Peripheral neuropathy 2010    RLS (restless legs syndrome)     Sleep apnea     Spondylolisthesis, acquired 2010     Past Surgical History:   Procedure Laterality Date    CARDIAC PROCEDURE N/A 1/3/2023    Left heart cath / coronary angiography performed by Rachelle Murphy MD at [address] [place] [address] Right 2014    CATARACT REMOVAL Bilateral 2017    CERVICAL DISCECTOMY  2002    CERVICAL FUSION   &     cervical plate placement     CHOLECYSTECTOMY  14    COLONOSCOPY      HERNIA REPAIR      JOINT REPLACEMENT      see Hips & Knees LUMBAR LAMINECTOMY  9/2/10    lumbar laminectomy L4 x 2/ rods    NEUROLOGICAL SURGERY  2013    T10-T12 fusion    ORTHOPEDIC SURGERY Right 1985    ankle fusion with hardware     ORTHOPEDIC SURGERY Left 2/14/2011    fusion of bones in foot    ORTHOPEDIC SURGERY  1/2013    rods in back     THORACIC LAMINECTOMY  4/2/09    T12, L2,L3    TOTAL HIP ARTHROPLASTY Bilateral     R in 2003, l in 2014    [address] [facility] Right 12/7/2009    TOTAL KNEE ARTHROPLASTY Left 12/8/2003     Current Outpatient Medications   Medication Sig Dispense Refill    tamsulosin (FLOMAX) 0.4 MG capsule Take 1 capsule by mouth nightly 90 capsule 3    Multiple Vitamins-Minerals (ABC COMPLETE SENIOR 50+ PO) Take by mouth daily      NONFORMULARY daily Restless leg vitamin      Apoaequorin (PREVAGEN PO) Take by mouth daily      aspirin 81 MG EC tablet Take 1 tablet by mouth daily 30 tablet     rosuvastatin (CRESTOR) 20 MG tablet Take 1 tablet by mouth daily 90 tablet 3    Testosterone (ANDROGEL) 20.25 MG/ACT (1.62%) GEL gel APPLY 40.5 MG TOPICALLY IN THE MORNING. 1 PUMP TO EACH SHOULDER.       Multiple Vitamins-Minerals (PRESERVISION AREDS 2+MULTI VIT PO) Take by mouth      Omega-3 Fatty Acids (FISH OIL OMEGA-3) 1000 MG CAPS Take by mouth      mirabegron (MYRBETRIQ) 50 MG TB24 Take 50 mg by mouth daily 90 tablet 3    Lidocaine 5 % CREA Topical 5% Lidocaine and 5% Neurontin to apply to affected area up to 4 times/day as needed for pain 45 g 2    vitamin D (CHOLECALCIFEROL) 25 MCG (1000 UT) TABS tablet Take 1,000 Units by mouth daily      finasteride (PROSCAR) 5 MG tablet Take 5 mg by mouth daily      folic acid (FOLVITE) 1 MG tablet Take by mouth daily      gabapentin (NEURONTIN) 400 MG capsule 1 po tid      latanoprost (XALATAN) 0.005 % ophthalmic solution Apply 1 drop to eye      lisinopril-hydroCHLOROthiazide (PRINZIDE;ZESTORETIC) 20-25 MG per tablet Takes 1/2 a Tab   1 po qd      meloxicam (MOBIC) 15 MG tablet Take 15 mg by mouth daily pramipexole (MIRAPEX) 0.5 MG tablet 1 po 5 times daily       No current facility-administered medications for this visit. Allergies   Allergen Reactions    Gadolinium Derivatives Other (See Comments) and Rash     Severe pain  Severe pain  Pain in shoulders      Metronidazole Other (See Comments) and Rash     Pt states burning sensation all over body. Other reaction(s): Unknown  Pt states burning sensation all over body. Pt states burning sensation all over body. Azithromycin Other (See Comments)     Severe headache    Erythromycin Other (See Comments)     Severe headache    Macrolides And Ketolides Rash and Other (See Comments)    Oxycodone Rash     Other reaction(s): Unknown    Penicillins Rash    Quinolones Rash     Burning sensation all over body      Tramadol Rash     Other reaction(s): Unknown     Social History     Socioeconomic History    Marital status:      Spouse name: Not on file    Number of children: Not on file    Years of education: Not on file    Highest education level: Not on file   Occupational History    Not on file   Tobacco Use    Smoking status: Former     Packs/day: 1.00     Years: 5.00     Pack years: 5.00     Types: Cigarettes     Quit date: 1967     Years since quittin.1    Smokeless tobacco: Never   Substance and Sexual Activity    Alcohol use:  Yes     Alcohol/week: 14.0 standard drinks     Types: 14 Cans of beer per week    Drug use: No     Types: Prescription    Sexual activity: Not Currently     Partners: Female   Other Topics Concern    Not on file   Social History Narrative    Not on file     Social Determinants of Health     Financial Resource Strain: Not on file   Food Insecurity: Not on file   Transportation Needs: Not on file   Physical Activity: Not on file   Stress: Not on file   Social Connections: Not on file   Intimate Partner Violence: Not on file   Housing Stability: Not on file     Family History   Problem Relation Age of Onset    Cancer Mother         hx of lymph node    Heart Disease Mother         carotid enarterectomy    Clotting Disorder Mother     Osteoarthritis Brother     Lung Disease Brother         COPD    Cancer Father         prostate       Review of Systems  Constitutional:   Negative for fever, chills, appetite change, malaise/fatigue, headaches and weight loss. Skin:  Negative for skin lesions, rash and itching. Eyes:  Negative for visual disturbance, eye pain and eye discharge. ENT:  Negative for difficulty articulating words, pain swallowing, high frequency hearing loss and dry mouth. Respiratory:  Negative for cough, blood in sputum, shortness of breath and wheezing. Cardiovascular:  Negative for chest pain, hypertension, irregular heartbeat, leg pain, leg swelling, regular rate and rhythm and varicose veins. GI:  Negative for nausea, vomiting, abdominal pain, blood in stool, constipation, diarrhea, indigestion and heartburn. Genitourinary: Positive for nocturia and leakage w/ urge. Negative for urinary burning, hematuria, flank pain, recurrent UTIs, history of urolithiasis, slower stream, straining, urgency, frequent urination, incomplete emptying, erectile dysfunction, testicular pain, sexually transmitted disease, discharge and urethral stricture. Musculoskeletal:  Negative for back pain, bone pain, arthralgias, tenderness, muscle weakness and neck pain. Neurological:  Negative for dizziness, focal weakness, numbness, seizures and tremors. Psychological:  Negative for depression and psychiatric problem. Endocrine:  Negative for cold intolerance, thirst, excessive urination, fatigue and heat intolerance. Hem/Lymphatic:  Negative for easy bleeding, easy bruising and frequent infections.     Urinalysis  UA - Dipstick  Results for orders placed or performed in visit on 02/01/23   AMB POC URINALYSIS DIP STICK AUTO W/O MICRO   Result Value Ref Range    Color (UA POC)      Clarity (UA POC)      Glucose, Urine, POC Negative Negative    Bilirubin, Urine, POC Negative Negative    KETONES, Urine, POC Negative Negative    Specific Gravity, Urine, POC 1.020 1.001 - 1.035    Blood (UA POC) Negative Negative    pH, Urine, POC 6.0 4.6 - 8.0    Protein, Urine, POC Negative Negative    Urobilinogen, POC 0.2 mg/dL     Nitrite, Urine, POC Negative Negative    Leukocyte Esterase, Urine, POC Negative Negative       UA - Micro  WBC - 0  RBC - 0  Bacteria - 0  Epith - 0  There were no vitals taken for this visit. GENERAL: No acute distress, Awake, Alert, Oriented X 3, Gait normal  CARDIAC: regular rate and rhythm  CHEST AND LUNG: Easy work of breathing, clear to auscultation bilaterally, no cyanosis  ABDOMEN: soft, non tender, non-distended, positive bowel sounds, no organomegaly, no palpable masses, no guarding, no rebound tenderness  SKIN: No rash, no erythema, no lacerations or abrasions, no ecchymosis  NEUROLOGIC: cranial nerves 2-12 grossly intact             Assessment and Plan    ICD-10-CM    1. Urge incontinence of urine  N39.41 AMB POC URINALYSIS DIP STICK AUTO W/O MICRO     AMB POC PVR, SADIE,POST-VOID RES,US,NON-IMAGING      2. Benign prostatic hyperplasia with urinary frequency  N40.1 tamsulosin (FLOMAX) 0.4 MG capsule    R35.0 PSA, Diagnostic     PSA, Diagnostic        BPH/LUTS:     Discussed persistent lUTS today despite max medical therapy. Options are continue current meds vs. Cysto/TRUS for surgical evaluation. Risks/benefits dicussed. He wants to proceed with cysto/TRUS    UUI:   See above. PSA Screening:   Discussed risks/benefits of PSA screening today. He wants to continue despite age. Will get PSA today. Last PSA normal in 2021    I have spent 30 minutes today reviewing previous notes, test results and face to face with the patient as well as documenting. Rodrigo Cassidy M.D.     [facility]  [place] [address] [address] [facility]  Phone: [phone]  Fax: [phone]

## 2023-02-02 LAB — PSA SERPL-MCNC: 1.2 NG/ML

## 2023-02-08 ENCOUNTER — PATIENT MESSAGE (OUTPATIENT)
Dept: CARDIOLOGY CLINIC | Age: 83
End: 2023-02-08

## 2023-02-08 NOTE — TELEPHONE ENCOUNTER
From: Rusty Menchaca  To: Dr. Theresa Toledo: 2/8/2023 1:13 PM EST  Subject: Testosterone Gel 1.62% Pump    During my recent visit with Dr. Ryder Leary I made an error saying that I no longer use the med. Testosterone Gel 1.62% Pump resulting in it being removed from my med. list. I still use the med. as prescribed by Dr. Josephine Perez to help raise my Hemeaglobin level. Unless you feel that the med. would have a negative effect on my heart condition; please add it back onto my med.  list.

## 2023-02-13 ENCOUNTER — PROCEDURE VISIT (OUTPATIENT)
Dept: UROLOGY | Age: 83
End: 2023-02-13
Payer: MEDICARE

## 2023-02-13 ENCOUNTER — OFFICE VISIT (OUTPATIENT)
Dept: UROLOGY | Age: 83
End: 2023-02-13
Payer: MEDICARE

## 2023-02-13 DIAGNOSIS — N39.41 URGE INCONTINENCE OF URINE: ICD-10-CM

## 2023-02-13 DIAGNOSIS — N40.1 BENIGN PROSTATIC HYPERPLASIA WITH URINARY FREQUENCY: Primary | ICD-10-CM

## 2023-02-13 DIAGNOSIS — R39.9 LOWER URINARY TRACT SYMPTOMS: ICD-10-CM

## 2023-02-13 DIAGNOSIS — R35.0 BENIGN PROSTATIC HYPERPLASIA WITH URINARY FREQUENCY: Primary | ICD-10-CM

## 2023-02-13 LAB
BILIRUBIN, URINE, POC: NEGATIVE
BLOOD URINE, POC: NEGATIVE
GLUCOSE URINE, POC: NEGATIVE
KETONES, URINE, POC: NEGATIVE
LEUKOCYTE ESTERASE, URINE, POC: NEGATIVE
NITRITE, URINE, POC: NEGATIVE
PH, URINE, POC: 5.5 (ref 4.6–8)
PROTEIN,URINE, POC: NEGATIVE
SPECIFIC GRAVITY, URINE, POC: 1.02 (ref 1–1.03)
URINALYSIS CLARITY, POC: NORMAL
URINALYSIS COLOR, POC: NORMAL
UROBILINOGEN, POC: NORMAL

## 2023-02-13 PROCEDURE — 52000 CYSTOURETHROSCOPY: CPT | Performed by: UROLOGY

## 2023-02-13 PROCEDURE — 76872 US TRANSRECTAL: CPT | Performed by: UROLOGY

## 2023-02-13 PROCEDURE — 1123F ACP DISCUSS/DSCN MKR DOCD: CPT | Performed by: UROLOGY

## 2023-02-13 PROCEDURE — G8417 CALC BMI ABV UP PARAM F/U: HCPCS | Performed by: UROLOGY

## 2023-02-13 PROCEDURE — G8427 DOCREV CUR MEDS BY ELIG CLIN: HCPCS | Performed by: UROLOGY

## 2023-02-13 PROCEDURE — 81003 URINALYSIS AUTO W/O SCOPE: CPT | Performed by: UROLOGY

## 2023-02-13 PROCEDURE — 1036F TOBACCO NON-USER: CPT | Performed by: UROLOGY

## 2023-02-13 PROCEDURE — 99214 OFFICE O/P EST MOD 30 MIN: CPT | Performed by: UROLOGY

## 2023-02-13 PROCEDURE — G8484 FLU IMMUNIZE NO ADMIN: HCPCS | Performed by: UROLOGY

## 2023-02-13 NOTE — PROGRESS NOTES
[facility]  [address]   [address]  [facility], [address] [facility]  [phone]    Carlie Suarez  : 1940    HPI   80 y.o.  male presents for cystoscopy for LUTS evaluation. Referred to clinic for evaluation of urinary incontinence secondary to presumed BPH and OAB. Seen 10/2022 and mirabegron 50 mg daily added to finasteride / flomax. He is also on TRT prescribed by his PCP. He does have a PMH of SALVATORE on CPAP. He also has a PMH of extensive back surgery and has hardware throughout most of his spine. Today, he reports NO improvement in urgency, frequency, urge urinary incontinence. He has no history of  surgery. Has never had cystoscopy. Has been told he has a large prostate. NO hematuria. NO retention. No UTIs. No other concerns.       PSA 1.61 (2021)     PVR: 132 cc     IPSS: 12     Lab Results   Component Value Date    PSA 1.2 2023         Past Medical History:   Diagnosis Date    Anemia     Coronary artery disease of native artery of native heart with stable angina pectoris ([facility] [address] 2022    Glaucoma     History of small bowel obstruction 2018    releived with bowel rest and NGT    HTN (hypertension)     Hypercholesterolemia     Iron overload     regular phlebotomy    Macular degeneration     Myelodysplastic syndrome (HCC)     Osteoarthritis of multiple joints     Peripheral neuropathy 2010    RLS (restless legs syndrome)     Sleep apnea     Spondylolisthesis, acquired 2010     Past Surgical History:   Procedure Laterality Date    CARDIAC PROCEDURE N/A 1/3/2023    Left heart cath / coronary angiography performed by Toby Jones MD at [address] [place] [address] Right 2014    CATARACT REMOVAL Bilateral 2017    CERVICAL DISCECTOMY  2002    CERVICAL FUSION   &     cervical plate placement     CHOLECYSTECTOMY  14    COLONOSCOPY      HERNIA REPAIR      JOINT REPLACEMENT see Hips & Knees    LUMBAR LAMINECTOMY  9/2/10    lumbar laminectomy L4 x 2/ rods    NEUROLOGICAL SURGERY  2013    T10-T12 fusion    ORTHOPEDIC SURGERY Right 1985    ankle fusion with hardware     ORTHOPEDIC SURGERY Left 2/14/2011    fusion of bones in foot    ORTHOPEDIC SURGERY  1/2013    rods in back     THORACIC LAMINECTOMY  4/2/09    T12, L2,L3    TOTAL HIP ARTHROPLASTY Bilateral     R in 2003, l in 2014    [address] [facility] Right 12/7/2009    TOTAL KNEE ARTHROPLASTY Left 12/8/2003     Current Outpatient Medications   Medication Sig Dispense Refill    tamsulosin (FLOMAX) 0.4 MG capsule Take 1 capsule by mouth nightly 90 capsule 3    Multiple Vitamins-Minerals (ABC COMPLETE SENIOR 50+ PO) Take by mouth daily      NONFORMULARY daily Restless leg vitamin      Apoaequorin (PREVAGEN PO) Take by mouth daily      aspirin 81 MG EC tablet Take 1 tablet by mouth daily 30 tablet     rosuvastatin (CRESTOR) 20 MG tablet Take 1 tablet by mouth daily 90 tablet 3    Testosterone (ANDROGEL) 20.25 MG/ACT (1.62%) GEL gel APPLY 40.5 MG TOPICALLY IN THE MORNING. 1 PUMP TO EACH SHOULDER.       Multiple Vitamins-Minerals (PRESERVISION AREDS 2+MULTI VIT PO) Take by mouth      Omega-3 Fatty Acids (FISH OIL OMEGA-3) 1000 MG CAPS Take by mouth      mirabegron (MYRBETRIQ) 50 MG TB24 Take 50 mg by mouth daily 90 tablet 3    Lidocaine 5 % CREA Topical 5% Lidocaine and 5% Neurontin to apply to affected area up to 4 times/day as needed for pain 45 g 2    vitamin D (CHOLECALCIFEROL) 25 MCG (1000 UT) TABS tablet Take 1,000 Units by mouth daily      finasteride (PROSCAR) 5 MG tablet Take 5 mg by mouth daily      folic acid (FOLVITE) 1 MG tablet Take by mouth daily      gabapentin (NEURONTIN) 400 MG capsule 1 po tid      latanoprost (XALATAN) 0.005 % ophthalmic solution Apply 1 drop to eye      lisinopril-hydroCHLOROthiazide (PRINZIDE;ZESTORETIC) 20-25 MG per tablet Takes 1/2 a Tab   1 po qd      meloxicam (MOBIC) 15 MG tablet Take 15 mg by mouth daily      pramipexole (MIRAPEX) 0.5 MG tablet 1 po 5 times daily       No current facility-administered medications for this visit. Allergies   Allergen Reactions    Gadolinium Derivatives Other (See Comments) and Rash     Severe pain  Severe pain  Pain in shoulders      Metronidazole Other (See Comments) and Rash     Pt states burning sensation all over body. Other reaction(s): Unknown  Pt states burning sensation all over body. Pt states burning sensation all over body. Azithromycin Other (See Comments)     Severe headache    Erythromycin Other (See Comments)     Severe headache    Macrolides And Ketolides Rash and Other (See Comments)    Oxycodone Rash     Other reaction(s): Unknown    Penicillins Rash    Quinolones Rash     Burning sensation all over body      Tramadol Rash     Other reaction(s): Unknown     Social History     Socioeconomic History    Marital status:      Spouse name: Not on file    Number of children: Not on file    Years of education: Not on file    Highest education level: Not on file   Occupational History    Not on file   Tobacco Use    Smoking status: Former     Packs/day: 1.00     Years: 5.00     Pack years: 5.00     Types: Cigarettes     Quit date: 1967     Years since quittin.1    Smokeless tobacco: Never   Substance and Sexual Activity    Alcohol use:  Yes     Alcohol/week: 14.0 standard drinks     Types: 14 Cans of beer per week    Drug use: No     Types: Prescription    Sexual activity: Not Currently     Partners: Female   Other Topics Concern    Not on file   Social History Narrative    Not on file     Social Determinants of Health     Financial Resource Strain: Not on file   Food Insecurity: Not on file   Transportation Needs: Not on file   Physical Activity: Not on file   Stress: Not on file   Social Connections: Not on file   Intimate Partner Violence: Not on file   Housing Stability: Not on file     Family History   Problem Relation Age of Onset    Cancer Mother         hx of lymph node    Heart Disease Mother         carotid enarterectomy    Clotting Disorder Mother     Osteoarthritis Brother     Lung Disease Brother         COPD    Cancer Father         prostate       There were no vitals taken for this visit. UA - Dipstick  Results for orders placed or performed in visit on 02/13/23   AMB POC URINALYSIS DIP STICK AUTO W/O MICRO   Result Value Ref Range    Color (UA POC)      Clarity (UA POC)      Glucose, Urine, POC Negative Negative    Bilirubin, Urine, POC Negative Negative    KETONES, Urine, POC Negative Negative    Specific Gravity, Urine, POC 1.025 1.001 - 1.035    Blood (UA POC) Negative Negative    pH, Urine, POC 5.5 4.6 - 8.0    Protein, Urine, POC Negative Negative    Urobilinogen, POC 0.2 mg/dL     Nitrite, Urine, POC Negative Negative    Leukocyte Esterase, Urine, POC Negative Negative       UA - Micro  WBC - 0  RBC - 0  Bacteria - 0  Epith - 0      There were no vitals taken for this visit. GENERAL: No acute distress, Awake, Alert, Oriented X 3, Gait normal  CARDIAC: regular rate and rhythm  CHEST AND LUNG: Easy work of breathing, clear to auscultation bilaterally, no cyanosis  ABDOMEN: soft, non tender, non-distended, positive bowel sounds, no organomegaly, no palpable masses, no guarding, no rebound tenderness  TIERRA: > 50 gram, symmetric, smooth without nodules  SKIN: No rash, no erythema, no lacerations or abrasions, no ecchymosis  NEUROLOGIC: cranial nerves 2-12 grossly intact   e    Cystoscopy Procedure:     Procedure Room # 1  Scope ID: [id number]  Assistant: [email or web address]      All risks, benefits and alternatives were again reviewed with patient and he is willing to proceed at this time. His genital area was prepped and draped and a sterile field applied. 2% lidocaine jelly was injected in the the urethra and allowed to dwell for several minutes.   A flexible cystoscope was then inserted into the urethral meatus and advanced under direct vision. The anterior and posterior urethra appeared normal in appearance. The prostatic urethra was severely obstructed with significant lateral lobe hypertrophy amenable to urolift or TURP but no significant median lobe. The bladder was systematically surveyed. +2 bladder trabeculations were seen. No mucosal abnormalities were seen. The ureteral orifices were seen in their normal orthotopic position. The cystoscope was then removed under direct vision. The patient tolerated the procedure well. Assessment and Plan    ICD-10-CM    1. Benign prostatic hyperplasia with urinary frequency  N40.1 CYSTOURETHROSCOPY    R35.0 AMB POC URINALYSIS DIP STICK AUTO W/O MICRO      2. Lower urinary tract symptoms  R39.9       3. Urge incontinence of urine  N39.41         LUTS:   Due to PACHECO from BPH and also possibly long history of spine issues/back surgeries. We discussed that addressing PACHECO and BPH is usually done first as in a lot of cases it resolves the LUTS    BPH\"  I discussed BPH at length with patient today. We discussed it's natural progression with aging. We discussed treatment options including doing nothing, adding a medication (alpha blocker vs. 5 alpha reductase inhibitor), and surgical options (urolift TURP vs. plasmabutton PVP). Advantages and potential risks/side effects of each were discussed. He has decided to think about urolift. He is a good candidate based on anatomy. Will call me if he wants to proceed    Patient had a complete established visit today for bph surgical management discussion that is separately identifiable from procedure performed today for LUTS evaluation. Complete documentation of this separate visit is present. I have spent 33 minutes today reviewing previous notes, test results and face to face with the patient as well as documenting. Rodrigo Chao M.D.     [facility]  [address] [place] [address],[address]  [address] [place] [id number]  Phone: [phone]  Fax: [phone]

## 2023-02-13 NOTE — PROGRESS NOTES
[facility]  [address]   [address]  [facility], [address] [facility]  [phone]    Mino Roldan  : 1940         HPI   80 y.o.  male who presents for prostate ultrasound for LUTS evaluation.        Past Medical History:   Diagnosis Date    Anemia     Coronary artery disease of native artery of native heart with stable angina pectoris ([address] 2022    Glaucoma     History of small bowel obstruction 2018    releived with bowel rest and NGT    HTN (hypertension)     Hypercholesterolemia     Iron overload     regular phlebotomy    Macular degeneration     Myelodysplastic syndrome (HCC)     Osteoarthritis of multiple joints     Peripheral neuropathy 2010    RLS (restless legs syndrome)     Sleep apnea     Spondylolisthesis, acquired 2010     Past Surgical History:   Procedure Laterality Date    CARDIAC PROCEDURE N/A 1/3/2023    Left heart cath / coronary angiography performed by Tong Villegas MD at [address] [place] [address] Right     CATARACT REMOVAL Bilateral 2017    CERVICAL DISCECTOMY  2002    CERVICAL FUSION   &     cervical plate placement     CHOLECYSTECTOMY  14    COLONOSCOPY      HERNIA REPAIR      JOINT REPLACEMENT      see Hips & Knees    LUMBAR LAMINECTOMY  9/2/10    lumbar laminectomy L4 x 2/ rods    NEUROLOGICAL SURGERY  2013    T10-T12 fusion    ORTHOPEDIC SURGERY Right     ankle fusion with hardware     ORTHOPEDIC SURGERY Left 2011    fusion of bones in foot    ORTHOPEDIC SURGERY  2013    rods in back     THORACIC LAMINECTOMY  09    T12, L2,L3    TOTAL HIP ARTHROPLASTY Bilateral     R in , l in     [address] [facility] Right 2009    TOTAL KNEE ARTHROPLASTY Left 2003     Current Outpatient Medications   Medication Sig Dispense Refill    tamsulosin (FLOMAX) 0.4 MG capsule Take 1 capsule by mouth nightly 90 capsule 3    Multiple Vitamins-Minerals (ABC COMPLETE SENIOR 50+ PO) Take by mouth daily      NONFORMULARY daily Restless leg vitamin      Apoaequorin (PREVAGEN PO) Take by mouth daily      aspirin 81 MG EC tablet Take 1 tablet by mouth daily 30 tablet     rosuvastatin (CRESTOR) 20 MG tablet Take 1 tablet by mouth daily 90 tablet 3    Testosterone (ANDROGEL) 20.25 MG/ACT (1.62%) GEL gel APPLY 40.5 MG TOPICALLY IN THE MORNING. 1 PUMP TO EACH SHOULDER. Multiple Vitamins-Minerals (PRESERVISION AREDS 2+MULTI VIT PO) Take by mouth      Omega-3 Fatty Acids (FISH OIL OMEGA-3) 1000 MG CAPS Take by mouth      mirabegron (MYRBETRIQ) 50 MG TB24 Take 50 mg by mouth daily 90 tablet 3    Lidocaine 5 % CREA Topical 5% Lidocaine and 5% Neurontin to apply to affected area up to 4 times/day as needed for pain 45 g 2    vitamin D (CHOLECALCIFEROL) 25 MCG (1000 UT) TABS tablet Take 1,000 Units by mouth daily      finasteride (PROSCAR) 5 MG tablet Take 5 mg by mouth daily      folic acid (FOLVITE) 1 MG tablet Take by mouth daily      gabapentin (NEURONTIN) 400 MG capsule 1 po tid      latanoprost (XALATAN) 0.005 % ophthalmic solution Apply 1 drop to eye      lisinopril-hydroCHLOROthiazide (PRINZIDE;ZESTORETIC) 20-25 MG per tablet Takes 1/2 a Tab   1 po qd      meloxicam (MOBIC) 15 MG tablet Take 15 mg by mouth daily      pramipexole (MIRAPEX) 0.5 MG tablet 1 po 5 times daily       No current facility-administered medications for this visit. Allergies   Allergen Reactions    Gadolinium Derivatives Other (See Comments) and Rash     Severe pain  Severe pain  Pain in shoulders      Metronidazole Other (See Comments) and Rash     Pt states burning sensation all over body. Other reaction(s): Unknown  Pt states burning sensation all over body. Pt states burning sensation all over body.        Azithromycin Other (See Comments)     Severe headache    Erythromycin Other (See Comments)     Severe headache    Macrolides And Ketolides Rash and Other (See Comments)    Oxycodone Rash Other reaction(s): Unknown    Penicillins Rash    Quinolones Rash     Burning sensation all over body      Tramadol Rash     Other reaction(s): Unknown     Social History     Socioeconomic History    Marital status:      Spouse name: Not on file    Number of children: Not on file    Years of education: Not on file    Highest education level: Not on file   Occupational History    Not on file   Tobacco Use    Smoking status: Former     Packs/day: 1.00     Years: 5.00     Pack years: 5.00     Types: Cigarettes     Quit date: 1967     Years since quittin.1    Smokeless tobacco: Never   Substance and Sexual Activity    Alcohol use:  Yes     Alcohol/week: 14.0 standard drinks     Types: 14 Cans of beer per week    Drug use: No     Types: Prescription    Sexual activity: Not Currently     Partners: Female   Other Topics Concern    Not on file   Social History Narrative    Not on file     Social Determinants of Health     Financial Resource Strain: Not on file   Food Insecurity: Not on file   Transportation Needs: Not on file   Physical Activity: Not on file   Stress: Not on file   Social Connections: Not on file   Intimate Partner Violence: Not on file   Housing Stability: Not on file     Family History   Problem Relation Age of Onset    Cancer Mother         hx of lymph node    Heart Disease Mother         carotid enarterectomy    Clotting Disorder Mother     Osteoarthritis Brother     Lung Disease Brother         COPD    Cancer Father         prostate       UA - Dipstick  Results for orders placed or performed in visit on 23   AMB POC URINALYSIS DIP STICK AUTO W/O MICRO   Result Value Ref Range    Color (UA POC)      Clarity (UA POC)      Glucose, Urine, POC Negative Negative    Bilirubin, Urine, POC Negative Negative    KETONES, Urine, POC Negative Negative    Specific Gravity, Urine, POC 1.025 1.001 - 1.035    Blood (UA POC) Negative Negative    pH, Urine, POC 5.5 4.6 - 8.0    Protein, Urine, POC Negative Negative    Urobilinogen, POC 0.2 mg/dL     Nitrite, Urine, POC Negative Negative    Leukocyte Esterase, Urine, POC Negative Negative       UA - Micro  WBC - 0  RBC - 0  Bacteria - 0  Epith - 0      There were no vitals taken for this visit.     GENERAL: No acute distress, Awake, Alert, Oriented X 3, Gait normal  CARDIAC: regular rate and rhythm  CHEST AND LUNG: Easy work of breathing, clear to auscultation bilaterally, no cyanosis  ABDOMEN: soft, non tender, non-distended, positive bowel sounds, no organomegaly, no palpable masses, no guarding, no rebound tenderness  SKIN: No rash, no erythema, no lacerations or abrasions, no ecchymosis  NEUROLOGIC: cranial nerves 2-12 grossly intact       TRANSRECTAL ULTRASOUND OF THE PROSTATE    All risks, benefits and alternatives were again reviewed and he is willing to proceed at this time.  The patient was placed in the left lateral decubitus position and the transrectal ultrasound probe was inserted into the rectum.  The prostate was visualized and measured.  The prostate appeared homogenous in appearance.  Measured prostate volume is listed below.  The ultrasound probe was removed.  The patient tolerated the procedure well.   Discharge instructions were again reviewed with the patient in detail.      PROSTATE VOLUME:  51.8 cc    Assessment and Plan    ICD-10-CM    1. Benign prostatic hyperplasia with urinary frequency  N40.1 AMB POC US, TRANSRECTAL    R35.0         TRUS volume 51.8 cc.  See note for further details.     Rodrigo Mills M.D.    [facility]  [address] [address]  Phone: [phone]  Fax: [phone]

## 2023-03-09 ENCOUNTER — HOSPITAL ENCOUNTER (OUTPATIENT)
Dept: PHYSICAL THERAPY | Age: 83
Setting detail: RECURRING SERIES
Discharge: HOME | End: 2023-03-12
Payer: MEDICARE

## 2023-03-09 PROCEDURE — 97162 PT EVAL MOD COMPLEX 30 MIN: CPT

## 2023-03-09 PROCEDURE — 97110 THERAPEUTIC EXERCISES: CPT

## 2023-03-09 NOTE — THERAPY EVALUATION
Ulices Springer  : 1940  Primary: Medicare Part A And B (Medicare)  Secondary: [address] @  [facility]  [address] [place] [address] SARAN [address] [place] [address]  Phone: [phone]  Fax: [phone] Plan Frequency: 2x per week for 90 days (pt requesting to start at 1x per week due to other appointments and conflicts)  Plan of Care/Certification Expiration Date: 23    PT Visit Info:  Plan Frequency: 2x per week for 90 days (pt requesting to start at 1x per week due to other appointments and conflicts)  Plan of Care/Certification Expiration Date: 23  Progress Note Counter: 1    Visit Count:  1                OUTPATIENT PHYSICAL THERAPY:             OP NOTE TYPE: Initial Assessment 3/9/2023               Episode (LBP) Appt Desk         Treatment Diagnosis:  Generalized Muscle Weakness (M62.81)  Difficulty in walking, Not elsewhere classified (R26.2)  Repeated Falls (R29.6)  History of falling (Z91.81)  Low Back Pain (M54.5)  Medical/Referring Diagnosis:  Radiculopathy, lumbosacral region [M54.17]  Referring Physician:  Sudheer Lane MD MD Orders:  PT Eval and Treat   Return MD Appt:    Date of Onset:     Chronic  Allergies:  Gadolinium derivatives, Metronidazole, Azithromycin, Erythromycin, Macrolides and ketolides, Oxycodone, Penicillins, Quinolones, and Tramadol  Restrictions/Precautions:           Medications Last Reviewed:  3/9/2023     SUBJECTIVE   History of Injury/Illness (Reason for Referral):  Pt arrives today with c/o generalized weakness, lack of mobility, and unsteadiness on feet. Pt does have history of multiple surgeries. Back, hips, knees and ankle. Pt reports he has a tendency to lean to the R with gait. Was using SPC for gait but has now started using rollator. Would like to be able to work on balance to return to use of [facility] versus rollator for stability. Pt reporting weakness B LE, coldness into B LE, and numbness. Pt stating he does have appointment for EMG and neurologist. Pt reports the coldness and numbness only happens first thing in the morning and improves with being upright for approximately 1 hour. Pt had has 2 falls in past 2 months. Pt has been using rollator for approximately 7-8 years now. Pt stating he is having difficulty getting in/out of shower, along with difficulty performing STS from seated surfaces. Patient Stated Goal(s): \"Improve mobility and balance to be able to walk altogether without an AD; if he does fall to have strength to get up off the floor \"  Initial:     0 /10 Post Session:     0 /10  Past Medical History/Comorbidities:   Mr. Arnulfo Sigala  has a past medical history of Anemia, Coronary artery disease of native artery of native heart with stable angina pectoris ([address] Glaucoma, History of small bowel obstruction, HTN (hypertension), Hypercholesterolemia, Iron overload, Macular degeneration, Myelodysplastic syndrome ([address] Osteoarthritis of multiple joints, Peripheral neuropathy, RLS (restless legs syndrome), Sleep apnea, and Spondylolisthesis, acquired. Mr. Arnulfo Sigala  has a past surgical history that includes total knee arthroplasty (Right, 12/7/2009); Carpal tunnel release (Right, 2014); thoracic laminectomy (4/2/09); Cervical discectomy (5/2002); Cholecystectomy (4/17/14); neurological surgery (2013); lumbar laminectomy (9/2/10); cervical fusion (2002 & 2011); Cataract removal (Bilateral, 2017); total hip arthroplasty (Bilateral); orthopedic surgery (Right, 1985); total knee arthroplasty (Left, 12/8/2003); orthopedic surgery (Left, 2/14/2011); orthopedic surgery (1/2013); Colonoscopy (2001/2008/2018); joint replacement; hernia repair; and Cardiac procedure (N/A, 1/3/2023). Social History/Living Environment:    Lives with Meaghan Born (girlfriend) - been together 12 years.  Daughter lives close by, no steps, 2 story with elevator     Prior Level of Function/Work/Activity:    Use of rollator for 7-8 years now.            Learning:   Does the patient/guardian have any barriers to learning?: No barriers  Will there be a co-learner?: No  What is the preferred language of the patient/guardian?: English  Is an  required?: No  How does the patient/guardian prefer to learn new concepts?: Listening; Reading; Demonstration; Pictures/Videos     Fall Risk Scale: Norman Total Score: 60  Norman Fall Risk: High (45 and higher)           OBJECTIVE   Posture: Increased lateral shift, kyphosis    Gait: Decreased step/stride length    Assistive Device: Rollator              Strength          Lower Extremity  Joint:      RIGHT LEFT   Hip Flexion 4/5 3/5   Hip Extension nt/5 Nt   Hip Internal Rotation 2/5 2/5   Hip External Rotation 2/5 2/5   Hip Abduction NT/5 NT   Knee flexion 3/5 4/5    Knee extension 5/5 5/5       ASSESSMENT   Initial Assessment:  Mr. Jae Nelson presents to physical therapy with c/o generalized weakness and unsteadiness on feet with a history of falls. Pt demonstrating decreased strength, ROM, functional mobility affecting participation in ADLs and functional mobility at this time. Patient will benefit from skilled physical therapy for manual therapeutic techniques (as appropriate), therapeutic exercises and activities, balance and comprehensive home exercises program to address current impairments and functional limitations. Tamara Aparicio will benefit from skilled PT (medically necessary) in order to address above deficits affecting participation in basic ADLs and overall functional tolerance. Problem List: (Impacting functional limitations): Body Structures, Functions, Activity Limitations Requiring Skilled Therapeutic Intervention: Decreased functional mobility ; Decreased ADL status; Decreased ROM; Decreased body mechanics; Decreased strength; Decreased endurance; Decreased balance; Decreased coordination;  Increased pain; Decreased posture     Therapy Prognosis:   Therapy Prognosis: Fair     Initial Assessment Complexity:   Decision Making: Medium Complexity    PLAN   Effective Dates: 3/9/23 TO Plan of Care/Certification Expiration Date: 06/07/23   Frequency/Duration: Plan Frequency: 2x per week for 90 days (pt requesting to start at 1x per week due to other appointments and conflicts)   Interventions Planned (Treatment may consist of any combination of the following):    Current Treatment Recommendations: Strengthening; ROM; Balance training; Functional mobility training; Endurance training; Gait training; Stair training; Neuromuscular re-education; Manual; Pain management; Home exercise program; Patient/Caregiver education & training; Safety education & training; Modalities; Positioning; Dry needling; Therapeutic activities     Goals: (Goals have been discussed and agreed upon with patient.)    Short-Term Functional Goals: Time Frame: 4 weeks  Pt will be compliant with HEP. Pt will decreased TUG time to < 26 seconds demonstrating improvements in overall mobility and balance  Pt will decreased 5X STS time to <15 seconds demonstrating improvements in overall mobility and strength  Pt will improve BLE strength to 4/5 in order to improve sit to stand from commode and into shower    Discharge Goals: Time Frame: 12 weeks  Pt will be independent with HEP. 2 Pt will decreased TUG time to < 20 seconds demonstrating improvements in overall mobility and balance  3. Pt will decreased 5X STS time to <10 seconds demonstrating improvements in overall mobility and strength  4. Pt will improve BLE strength to 4+/5 in order to improve sit to stand from commode and into shower             Outcome Measure: Tool Used: Modified Oswestry Low Back Pain Questionnaire  Score:  Initial: 15/50  Most Recent: X/50 (Date: -- )   Interpretation of Score: Each section is scored on a 0-5 scale, 5 representing the greatest disability. The scores of each section are added together for a total score of 50.       Tool Used: Five Times Sit to Stand (FTSST or 5xSST)   Score:  Initial: 17.47 seconds- very poor eccentric control Most Recent:  seconds (Date: -- )   Interpretation of Score: This is a measure of functional lower limb muscle strength and quantifying functional change of transitional movements. A score of 12 seconds or less indicates a normal level of function for community dwelling older individuals, a score of 15 seconds or more is at risk for fall. Tool Used: 6-MINUTE WALK TEST  Score:  Initial: NT this visit feet Most Recent: X feet (Date: -- )   Interpretation of Score: Normal range varies but is approximately 6302-5535 Feet      Tool Used: Timed Up and Go (TUG)  Score:  Initial: 30.29 seconds Most Recent: X seconds (Date: -- )   Interpretation of Score: The test measures, in seconds, the time taken by an individual to stand up from a standard arm chair (seat height 46 cm [18 in], arm height 65 cm [25.6 in]), walk a distance of 3 meters (118 in, approx 10 ft), turn, walk back to the chair and sit down. If the individual takes longer than 14 seconds to complete TUG, this indicates risk for falls. Medical Necessity:   Skilled intervention continues to be required due to decreased strength, balance, ROM and pain for return to PLOF and functional mobility. Reason For Services/Other Comments:  Patient continues to require skilled intervention due to patient continues to present with impairments assessed at initial evaluation and requiring skilled physical therapy to meet goals for PT. Total Duration:  Time In: 1100  Time Out: [address]'s therapy, I certify that the treatment plan above will be carried out by a therapist or under their direction.   Thank you for this referral,  Cesar Lebron, PT     Referring Physician Signature: Tan Duenas.,* _______________________________ Date _____________        Post Session Pain  Charge Capture  PT Visit Info MD Guidelines MyChart

## 2023-03-09 NOTE — PROGRESS NOTES
Danika Guthrie  : 1940  Primary: Medicare Part A And B (Medicare)  Secondary: [address] @ [facility]  [address] [address] [facility] [address]  Phone: [phone]  Fax: [phone] Plan Frequency: 2x per week for 90 days (pt requesting to start at 1x per week due to other appointments and conflicts)    Plan of Care/Certification Expiration Date: 23      PT Visit Info:  Plan Frequency: 2x per week for 90 days (pt requesting to start at 1x per week due to other appointments and conflicts)  Plan of Care/Certification Expiration Date: 23  Progress Note Counter: 1      Visit Count:  1    OUTPATIENT PHYSICAL THERAPY:OP NOTE TYPE: Treatment Note 3/9/2023       Episode  }Appt Desk             Treatment Diagnosis:    Generalized Muscle Weakness (M62.81)  Difficulty in walking, Not elsewhere classified (R26.2)  Repeated Falls (R29.6)  History of falling (Z91.81)  Low Back Pain (M54.5)  Medical/Referring Diagnosis:  Radiculopathy, lumbosacral region [M54.17]  Referring Physician:  Steve Armstrong MD MD Orders:  PT Eval and Treat   Date of Onset:  No data recorded   Allergies:   Gadolinium derivatives, Metronidazole, Azithromycin, Erythromycin, Macrolides and ketolides, Oxycodone, Penicillins, Quinolones, and Tramadol  Restrictions/Precautions:  No data recorded  No data recorded   Interventions Planned (Treatment may consist of any combination of the following):    Current Treatment Recommendations: Strengthening; ROM; Balance training; Functional mobility training; Endurance training; Gait training; Stair training; Neuromuscular re-education; Manual; Pain management; Home exercise program; Patient/Caregiver education & training; Safety education & training; Modalities; Positioning; Dry needling;  Therapeutic activities     Subjective Comments: Pt arrives with c/o unsteadiness on feet and generalized weakness     Initial:}    0 /10Post Session:       0 /10  Medications Last Reviewed:  3/9/2023  Updated Objective Findings:  See evaluation note from today  Treatment   THERAPEUTIC EXERCISE: (15 minutes):  Exercises per grid below to improve mobility, strength and balance. Required minimal-moderate visual and verbal cues to promote proper body alignment and promote proper body posture. Progressed resistance, range and complexity of movement as indicated. Date:  3/9/23 Date:   Date:     Activity/Exercise Parameters Parameters Parameters   Bridges HEP, 10 X 2     Supine clamshell HEP, 10 X 2     Seated marches HEP, 10 X 1 each     Seated LAQ HEP, 10 X 1 each                         MANUAL THERAPY: (0 minutes): Joint mobilization, Soft tissue mobilization and Manipulation was utilized and necessary because of the patient's restricted joint motion, painful spasm, loss of articular motion and restricted motion of soft tissue. (Used abbreviations: MET - muscle energy technique; PNF - proprioceptive neuromuscular facilitation; NMR - neuromuscular re-education; AP - anterior to posterior; PA - posterior to anterior)    MODALITIES: (0 minutes):        none        Treatment/Session Summary:    Treatment Assessment: Pt tolerated HEP well. Pt requiring VC and TC for technique with exercises. Provided handout. Pt wanting to start with 1X per week at this time due to scheduling conflicts. Communication/Consultation:   on objective findings and HEP  Equipment provided today:  None  Recommendations/Intent for next treatment session: Next visit will focus on Advancements to more challenging activities. Progress repetitions, weight, and complexity of movement per pt tolerance and as indicated.   .    Total Treatment Billable Duration:  40 minutes  Time In: 1100  Time Out: 1140    Michael Burnett PT       Charge Capture  }Post Session Pain  PT Visit Info  WonderHowTo Portal  MD Guidelines  Scanned Media  Benefits  MyChart    Future Appointments Date Time Provider Myriam Zarate   3/14/2023  1:45 PM Conchis Veronica, PT [facility]   3/21/2023  1:45 PM Conchis Veronica, PT [facility]   3/28/2023  1:45 PM Conchis Veronica, PT [facility]   4/4/2023  1:45 PM Conchis Veronica, PT [facility]   4/12/2023  1:45 PM Conchis Veronica, PT [facility]   6/27/2023 12:40 PM [address] [place] [address] [employer] [address] [place] [address]   6/27/2023  1:15 PM Alejandrina Lynn MD [facility]   8/1/2023  2:30 PM Hardik Lechuga MD [facility]

## 2023-03-14 ENCOUNTER — HOSPITAL ENCOUNTER (OUTPATIENT)
Dept: PHYSICAL THERAPY | Age: 83
Setting detail: RECURRING SERIES
End: 2023-03-14
Payer: MEDICARE

## 2023-03-14 NOTE — PROGRESS NOTES
Nelli Garrett  : 1940  Primary: Medicare Part A And B  Secondary: [address] @ [address] [place], [place] [address]y [address]  [address]  Phone: [phone]  Fax: [phone] Plan Frequency: 2x per week for 90 days (pt requesting to start at 1x per week due to other appointments and conflicts)    Plan of Care/Certification Expiration Date: 23      PT Visit Info:  Progress Note Counter: 1  Canceled Appointment: 1         OUTPATIENT PHYSICAL THERAPY 3/14/2023     Appt Desk   Episode   MyChart      Mr. Omar Acevedo was a same-day cancellation for today's appointment due to increased symptoms in his legs. He says he is scheduled to received a nerve conduction test and is planning to return next week for his scheduled PT visits.     Cancellation Number: 1     Agustina Range, PT    Future Appointments   Date Time Provider Myriam Zarate   3/14/2023  1:45 PM Agustina Annabel, PT [facility]   3/21/2023  7:00 PM Agustina Range, PT [facility]   3/28/2023  1:45 PM Agustina Range, PT [facility]   2023  1:45 PM Agustina Range, PT [facility]   2023  1:45 PM Agustina Range, PT [facility]   2023 12:40 PM [address] [place] [address] [employer] [address] [place] [address]   2023  1:15 PM Erica Sanchez MD [facility]   2023  2:30 PM Darylene Hoyle, MD [facility]

## 2023-03-21 ENCOUNTER — APPOINTMENT (OUTPATIENT)
Dept: PHYSICAL THERAPY | Age: 83
End: 2023-03-21
Payer: MEDICARE

## 2023-03-28 ENCOUNTER — HOSPITAL ENCOUNTER (OUTPATIENT)
Dept: PHYSICAL THERAPY | Age: 83
Setting detail: RECURRING SERIES
End: 2023-03-28
Payer: MEDICARE

## 2023-03-28 NOTE — PROGRESS NOTES
Angelic Almonte  : 1940  Primary: Medicare Part A And B  Secondary: [address] @ [facility]  [address] [address] [facility] [address]  [address]  Phone: [phone]  Fax: [phone] Plan Frequency: 2x per week for 90 days (pt requesting to start at 1x per week due to other appointments and conflicts)    Plan of Care/Certification Expiration Date: 23      PT Visit Info:  Progress Note Counter: 1  Canceled Appointment: 2         OUTPATIENT PHYSICAL THERAPY 3/28/2023     Appt Desk   Episode   MyChart      Mr. Anaya Riddle was a same-day cancellation for today's appointment due to his back hurting. He says he has a follow-up visit with Dr. Nika Ceballos scheduled for next week and he'll call the clinic to alert us if he wishes to continue with therapy or be discharged.     Cancellation Number: 2     Zaynab Cardona, SUMEET    Future Appointments   Date Time Provider Myriam Zarate   2023  1:45 PM Itzel Barcenas [facility]   2023  1:45 PM Zaynab Cardona PT [facility]   2023 12:40 PM Lucio [address] [place] [address]   2023  1:15 PM Yash Lacy MD [facility]   2023  2:30 PM Chelsea Langford MD [facility]

## 2023-04-04 ENCOUNTER — HOSPITAL ENCOUNTER (OUTPATIENT)
Dept: PHYSICAL THERAPY | Age: 83
Setting detail: RECURRING SERIES
Discharge: HOME | End: 2023-04-07
Payer: MEDICARE

## 2023-04-04 PROCEDURE — 97110 THERAPEUTIC EXERCISES: CPT

## 2023-04-04 ASSESSMENT — PAIN SCALES - GENERAL: PAINLEVEL_OUTOF10: 0

## 2023-04-04 NOTE — PROGRESS NOTES
degrees      Date: 3/9/23 Date: 4/4/23 Date:    Activity/Exercise      Patient assessment/education        Bridges 2 x 10       Clam shells 2 x 10 supine 2 x 10 sidelying  Green TB at knees    Seated marches 1 x 10       LAQ 1 x 10 2 x 10 each side  2.5# ankle weight    Lower trunk rotations  1 x 10 each side  Slight pain in back    Standing heel raises  1 x 10 B  Difficult due to R ankle fusion    Side steps against resistance  1 x 10 each side  Green TB at knees                  Treatment/Session Summary:    Treatment Assessment: Patient tolerated therapy fairly well this afternoon. He reported increased numbness in his legs following LE strengthening exercises today, which is a concern for falling. He says he's waiting on an MRI and another test with Dr. Arturo Fonseca to determine next steps. Communication/Consultation: Patient encouraged to perform HEP consistently. Equipment provided today: None. Recommendations/Intent for next treatment session: Next visit will focus on Advancements to more challenging activities. Progress repetitions, weight, and complexity of movement per pt tolerance and as indicated.     Total Treatment Billable Duration: 39 minutes  Time In: 8487  Time Out: 2370 [facility], PT       Charge Capture  }Post Session Pain  PT Visit Info  [employer] Portal  MD Guidelines  Scanned Media  Benefits  MyChart    Future Appointments   Date Time Provider Myriam Zarate   4/12/2023  1:45 PM Gadiel Collins [facility]   6/27/2023 12:40 PM Lucio Ny [facility]   6/27/2023  1:15 PM Gloria Alexis MD [facility]   8/1/2023  2:30 PM Eleuterio English MD [facility]

## 2023-04-12 ENCOUNTER — APPOINTMENT (OUTPATIENT)
Dept: PHYSICAL THERAPY | Age: 83
End: 2023-04-12
Payer: MEDICARE

## 2023-07-11 ENCOUNTER — HOSPITAL ENCOUNTER (OUTPATIENT)
Dept: LAB | Age: 83
Discharge: HOME | End: 2023-07-14
Payer: MEDICARE

## 2023-07-11 ENCOUNTER — OFFICE VISIT (OUTPATIENT)
Dept: ONCOLOGY | Age: 83
End: 2023-07-11
Payer: MEDICARE

## 2023-07-11 VITALS
RESPIRATION RATE: 16 BRPM | TEMPERATURE: 98.4 F | BODY MASS INDEX: 29.63 KG/M2 | HEIGHT: 60 IN | DIASTOLIC BLOOD PRESSURE: 74 MMHG | WEIGHT: 150.9 LBS | HEART RATE: 71 BPM | OXYGEN SATURATION: 99 % | SYSTOLIC BLOOD PRESSURE: 134 MMHG

## 2023-07-11 DIAGNOSIS — D46.9 MYELODYSPLASTIC SYNDROME (HCC): Primary | ICD-10-CM

## 2023-07-11 DIAGNOSIS — D46.9 MYELODYSPLASTIC SYNDROME (HCC): ICD-10-CM

## 2023-07-11 DIAGNOSIS — R53.83 FATIGUE, UNSPECIFIED TYPE: ICD-10-CM

## 2023-07-11 DIAGNOSIS — E83.19 IRON OVERLOAD: ICD-10-CM

## 2023-07-11 LAB
ALBUMIN SERPL-MCNC: 3.8 G/DL (ref 3.2–4.6)
ALBUMIN/GLOB SERPL: 1.3 (ref 0.4–1.6)
ALP SERPL-CCNC: 77 U/L (ref 50–136)
ALT SERPL-CCNC: 24 U/L (ref 12–65)
ANION GAP SERPL CALC-SCNC: 5 MMOL/L (ref 2–11)
AST SERPL-CCNC: 21 U/L (ref 15–37)
BASOPHILS # BLD: 0 K/UL (ref 0–0.2)
BASOPHILS NFR BLD: 0 % (ref 0–2)
BILIRUB SERPL-MCNC: 0.4 MG/DL (ref 0.2–1.1)
BUN SERPL-MCNC: 37 MG/DL (ref 8–23)
CALCIUM SERPL-MCNC: 9.2 MG/DL (ref 8.3–10.4)
CHLORIDE SERPL-SCNC: 106 MMOL/L (ref 101–110)
CO2 SERPL-SCNC: 27 MMOL/L (ref 21–32)
CREAT SERPL-MCNC: 0.9 MG/DL (ref 0.8–1.5)
DIFFERENTIAL METHOD BLD: ABNORMAL
EOSINOPHIL # BLD: 0 K/UL (ref 0–0.8)
EOSINOPHIL NFR BLD: 1 % (ref 0.5–7.8)
ERYTHROCYTE [DISTWIDTH] IN BLOOD BY AUTOMATED COUNT: 15.9 % (ref 11.9–14.6)
ERYTHROCYTE [SEDIMENTATION RATE] IN BLOOD: 2 MM/HR (ref 0–20)
FERRITIN SERPL-MCNC: 87 NG/ML (ref 8–388)
GLOBULIN SER CALC-MCNC: 3 G/DL (ref 2.8–4.5)
GLUCOSE SERPL-MCNC: 100 MG/DL (ref 65–100)
HCT VFR BLD AUTO: 25.3 % (ref 41.1–50.3)
HGB BLD-MCNC: 8.5 G/DL (ref 13.6–17.2)
IMM GRANULOCYTES # BLD AUTO: 0 K/UL (ref 0–0.5)
IMM GRANULOCYTES NFR BLD AUTO: 0 % (ref 0–5)
IRON SATN MFR SERPL: 18 %
IRON SERPL-MCNC: 62 UG/DL (ref 35–150)
LYMPHOCYTES # BLD: 0.8 K/UL (ref 0.5–4.6)
LYMPHOCYTES NFR BLD: 17 % (ref 13–44)
MCH RBC QN AUTO: 33.7 PG (ref 26.1–32.9)
MCHC RBC AUTO-ENTMCNC: 33.6 G/DL (ref 31.4–35)
MCV RBC AUTO: 100.4 FL (ref 82–102)
MONOCYTES # BLD: 0.4 K/UL (ref 0.1–1.3)
MONOCYTES NFR BLD: 9 % (ref 4–12)
NEUTS SEG # BLD: 3.3 K/UL (ref 1.7–8.2)
NEUTS SEG NFR BLD: 73 % (ref 43–78)
NRBC # BLD: 0 K/UL (ref 0–0.2)
PLATELET # BLD AUTO: 135 K/UL (ref 150–450)
PMV BLD AUTO: 10.4 FL (ref 9.4–12.3)
POTASSIUM SERPL-SCNC: 4.8 MMOL/L (ref 3.5–5.1)
PROT SERPL-MCNC: 6.8 G/DL (ref 6.3–8.2)
RBC # BLD AUTO: 2.52 M/UL (ref 4.23–5.6)
SODIUM SERPL-SCNC: 138 MMOL/L (ref 133–143)
TIBC SERPL-MCNC: 342 UG/DL (ref 250–450)
WBC # BLD AUTO: 4.6 K/UL (ref 4.3–11.1)

## 2023-07-11 PROCEDURE — G8427 DOCREV CUR MEDS BY ELIG CLIN: HCPCS | Performed by: NURSE PRACTITIONER

## 2023-07-11 PROCEDURE — 3075F SYST BP GE 130 - 139MM HG: CPT | Performed by: NURSE PRACTITIONER

## 2023-07-11 PROCEDURE — 83550 IRON BINDING TEST: CPT

## 2023-07-11 PROCEDURE — 80053 COMPREHEN METABOLIC PANEL: CPT

## 2023-07-11 PROCEDURE — 83540 ASSAY OF IRON: CPT

## 2023-07-11 PROCEDURE — 85025 COMPLETE CBC W/AUTO DIFF WBC: CPT

## 2023-07-11 PROCEDURE — 1036F TOBACCO NON-USER: CPT | Performed by: NURSE PRACTITIONER

## 2023-07-11 PROCEDURE — 99214 OFFICE O/P EST MOD 30 MIN: CPT | Performed by: NURSE PRACTITIONER

## 2023-07-11 PROCEDURE — 85652 RBC SED RATE AUTOMATED: CPT

## 2023-07-11 PROCEDURE — 1123F ACP DISCUSS/DSCN MKR DOCD: CPT | Performed by: NURSE PRACTITIONER

## 2023-07-11 PROCEDURE — 36415 COLL VENOUS BLD VENIPUNCTURE: CPT

## 2023-07-11 PROCEDURE — G8417 CALC BMI ABV UP PARAM F/U: HCPCS | Performed by: NURSE PRACTITIONER

## 2023-07-11 PROCEDURE — 82728 ASSAY OF FERRITIN: CPT

## 2023-07-11 PROCEDURE — 3078F DIAST BP <80 MM HG: CPT | Performed by: NURSE PRACTITIONER

## 2023-07-11 NOTE — PROGRESS NOTES
preference is to minimize visits, we will see him back in 6 months moving  forward, understands to reach out to us should there be a significant change in his blood count. 12/29/2022: Reports doing reasonably. Now plans for heart cath after an abnormal stress test.  Denies any symptoms. Denies any bleeding. Blood work with hemoglobin stable at 9.7, platelet count normal range at 1 51,000. Iron stores adequate with ferritin 65. ESR normal.  Continue monitoring, we will see him back in 6 months.    7/11/2023: Here today for follow up regarding his history of MDS. He has been well overall. Perhaps a bit more fatigued recently. No recent fevers, lymphadenopathy, or night sweats. He denies bleeding but he does bruise easily. No GI symptoms. Eating and drinking okay. He had lost about 20 pounds in about 9 months unintentionally but back up 5 pounds. Labs reviewed and his Hgb is slightly lower at 8.5, WBC 4.6 and platelets stable at 090,248. ESR remains normal. Will continue to monitor but will repeat labs in 4 months instead of 6 months. PLAN:   - MDS: Counts stable. Monitoring alone for now. - H63D heterozygous Hemachromatosis: Ferritin borderline high: continue phlebotomies as tolerated w each visit (goal ferritin close to 100, hold for hgb below 10.5). Educated on foods  w high iron level. May consider Exjade if iron stores start rising and he cannot tolerate phlebotomies. Being monitored now. 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 Ortho following. RTC in 4 months with labs, ESR, iron panel. Consider repeat BM biopsy every 2-3 years (or if counts start dropping).          GARCÍA Breen  [address] [place] [address] Drive Hematology Oncology  [address]  Office : [phone]  Fax : [phone]

## 2023-08-15 DIAGNOSIS — E78.5 DYSLIPIDEMIA: ICD-10-CM

## 2023-08-16 LAB
CHOLEST SERPL-MCNC: 101 MG/DL
HDLC SERPL-MCNC: 61 MG/DL (ref 40–60)
HDLC SERPL: 1.7
LDLC SERPL CALC-MCNC: 29.4 MG/DL
TRIGL SERPL-MCNC: 53 MG/DL (ref 35–150)
VLDLC SERPL CALC-MCNC: 10.6 MG/DL (ref 6–23)

## 2023-08-17 NOTE — PROGRESS NOTES
[facility] CARDIOLOGY  [address] [facility], [address]  PHONE: [phone]      23    NAME:  Franco Molina  : 1940  MRN: [id number]         SUBJECTIVE:   Franco Molina is a 80 y.o. male seen for a follow up visit regarding the following:     Chief Complaint   Patient presents with    Hypertension    Coronary Artery Disease            HPI:  Follow up  Hypertension and Coronary Artery Disease   . Follow up  of the RCA too small for intervention . Aggressive lipid therapy, LDL now 23. He has chronically low BP but lower today than usual.  He feels light headed with near syncope about once or twice a week, lasting about 5 minutes. Doesn't check BP at home. Dr Paulette Houston reduced his BP meds but that's been some months back, still having episodes. Requires tamsulosin for BPH. Apart from these episodes, he has no chest discomfort, palpitations. Most of his symptoms center upon his severe musculoskeletal issues. Past cardiac history:   - Normal EF, mild LVH, impaired relaxation, AV sclerosis without stenosis   May 2020- Echo normal SF, DF, mild AV sclerosis, no stenosis   2021- normal vasodilator perfusion study   7 day monitor - NSR, rare pac. 5.5 seconds atrial tachycardia. Several diary entries of fluttering generally with NSR, occasionally with PAC. Atrial tachy episode associated with brief light headedness  2022       NST - reversible mid and distal anterior defect with TID 1.39, preserved EF  2023       LHC -  RCA with faint collaterals, too small for PCI  2023       Bilat DANA's > 1.4, noncompressible. No aneurysm--Dr Vasquez Mckeon CAD CHF Meds            rosuvastatin (CRESTOR) 20 MG tablet (Taking)    Sig - Route: Take 1 tablet by mouth daily - Oral    lisinopril (PRINIVIL;ZESTRIL) 10 MG tablet (Taking)    Sig - Route:  Take 1 tablet by mouth daily - Oral          Key Antihyperglycemic Medications

## 2023-08-18 ENCOUNTER — OFFICE VISIT (OUTPATIENT)
Age: 83
End: 2023-08-18
Payer: MEDICARE

## 2023-08-18 VITALS
HEIGHT: 60 IN | BODY MASS INDEX: 28.98 KG/M2 | HEART RATE: 80 BPM | DIASTOLIC BLOOD PRESSURE: 44 MMHG | SYSTOLIC BLOOD PRESSURE: 84 MMHG | WEIGHT: 147.6 LBS

## 2023-08-18 DIAGNOSIS — I95.1 ORTHOSTATIC HYPOTENSION: ICD-10-CM

## 2023-08-18 DIAGNOSIS — I25.118 CORONARY ARTERY DISEASE OF NATIVE ARTERY OF NATIVE HEART WITH STABLE ANGINA PECTORIS (HCC): Primary | ICD-10-CM

## 2023-08-18 DIAGNOSIS — E78.5 DYSLIPIDEMIA: ICD-10-CM

## 2023-08-18 PROCEDURE — 3078F DIAST BP <80 MM HG: CPT | Performed by: INTERNAL MEDICINE

## 2023-08-18 PROCEDURE — G8417 CALC BMI ABV UP PARAM F/U: HCPCS | Performed by: INTERNAL MEDICINE

## 2023-08-18 PROCEDURE — 1036F TOBACCO NON-USER: CPT | Performed by: INTERNAL MEDICINE

## 2023-08-18 PROCEDURE — 99214 OFFICE O/P EST MOD 30 MIN: CPT | Performed by: INTERNAL MEDICINE

## 2023-08-18 PROCEDURE — G8427 DOCREV CUR MEDS BY ELIG CLIN: HCPCS | Performed by: INTERNAL MEDICINE

## 2023-08-18 PROCEDURE — 1123F ACP DISCUSS/DSCN MKR DOCD: CPT | Performed by: INTERNAL MEDICINE

## 2023-08-18 PROCEDURE — 3074F SYST BP LT 130 MM HG: CPT | Performed by: INTERNAL MEDICINE

## 2023-08-18 RX ORDER — ROSUVASTATIN CALCIUM 20 MG/1
20 TABLET, COATED ORAL DAILY
Qty: 90 TABLET | Refills: 3 | Status: SHIPPED | OUTPATIENT
Start: 2023-08-18

## 2023-08-18 RX ORDER — LISINOPRIL 10 MG/1
10 TABLET ORAL DAILY
Qty: 90 TABLET | Refills: 3 | Status: SHIPPED | OUTPATIENT
Start: 2023-08-18 | End: 2023-08-18 | Stop reason: SDUPTHER

## 2023-08-18 RX ORDER — LISINOPRIL 10 MG/1
10 TABLET ORAL DAILY
Qty: 30 TABLET | Refills: 3 | Status: SHIPPED | OUTPATIENT
Start: 2023-08-18

## 2023-08-18 NOTE — PATIENT INSTRUCTIONS
1. Monitor BP at home 2-3 days a week,   2. Keep a diary to include date, time, and BP reading  3. Bring your BP diary and monitor to each office visit  4. Notify us if BP readings are consistently greater than 160/90 or less than 95/50.  5. To help keep BP regulated, aim to get at least 30 minutes of moderate physical activity daily, and follow a low sodium diet regularly by avoiding processed foods or added salt.

## 2023-08-21 DIAGNOSIS — N39.41 URGE INCONTINENCE OF URINE: ICD-10-CM

## 2023-08-21 RX ORDER — MIRABEGRON 50 MG/1
TABLET, FILM COATED, EXTENDED RELEASE ORAL
Qty: 90 TABLET | Refills: 3 | Status: SHIPPED | OUTPATIENT
Start: 2023-08-21

## 2023-10-03 ENCOUNTER — PATIENT MESSAGE (OUTPATIENT)
Age: 83
End: 2023-10-03

## 2023-10-03 NOTE — TELEPHONE ENCOUNTER
Patient has severe hypotension to the 80's, thus the meds were reduced. He also has BPH, another potential cause for edema. Recommend compression socks, leg elevation, ER for severe symptoms, follow up with urologist as well, and may make an office appointment in the next few weeks if not better.

## 2023-10-03 NOTE — TELEPHONE ENCOUNTER
Spoke to pt, reviewed Dr. Valerie Cano response. Pt states he is concerned about the amount of edema and believes he needs to start the hctz back. Recommend pt try the compression socks and elevation for the next week and call back if no improvement. Verb understanding.

## 2023-10-31 ENCOUNTER — TELEPHONE (OUTPATIENT)
Age: 83
End: 2023-10-31

## 2023-10-31 DIAGNOSIS — D46.9 MYELODYSPLASTIC SYNDROME, UNSPECIFIED (HCC): ICD-10-CM

## 2023-10-31 DIAGNOSIS — D46.9 MYELODYSPLASTIC SYNDROME (HCC): Primary | ICD-10-CM

## 2023-10-31 DIAGNOSIS — E83.19 IRON OVERLOAD: ICD-10-CM

## 2023-10-31 NOTE — TELEPHONE ENCOUNTER
----- Message from Carla Diaz MD sent at 10/31/2023  2:41 PM EDT -----  Please notify patient monitor is normal.

## 2023-11-07 ENCOUNTER — HOSPITAL ENCOUNTER (OUTPATIENT)
Dept: LAB | Age: 83
Discharge: HOME | End: 2023-11-10
Payer: MEDICARE

## 2023-11-07 ENCOUNTER — OFFICE VISIT (OUTPATIENT)
Dept: ONCOLOGY | Age: 83
End: 2023-11-07
Payer: MEDICARE

## 2023-11-07 VITALS
HEIGHT: 60 IN | HEART RATE: 68 BPM | WEIGHT: 151.9 LBS | TEMPERATURE: 98.1 F | SYSTOLIC BLOOD PRESSURE: 122 MMHG | OXYGEN SATURATION: 95 % | RESPIRATION RATE: 16 BRPM | BODY MASS INDEX: 29.82 KG/M2 | DIASTOLIC BLOOD PRESSURE: 68 MMHG

## 2023-11-07 DIAGNOSIS — D46.9 MYELODYSPLASTIC SYNDROME (HCC): ICD-10-CM

## 2023-11-07 DIAGNOSIS — D46.9 MYELODYSPLASTIC SYNDROME, UNSPECIFIED (HCC): ICD-10-CM

## 2023-11-07 DIAGNOSIS — D46.9 MYELODYSPLASTIC SYNDROME (HCC): Primary | ICD-10-CM

## 2023-11-07 DIAGNOSIS — E83.19 IRON OVERLOAD: ICD-10-CM

## 2023-11-07 LAB
ALBUMIN SERPL-MCNC: 3.8 G/DL (ref 3.2–4.6)
ALBUMIN/GLOB SERPL: 1.4 (ref 0.4–1.6)
ALP SERPL-CCNC: 81 U/L (ref 50–136)
ALT SERPL-CCNC: 29 U/L (ref 12–65)
ANION GAP SERPL CALC-SCNC: 6 MMOL/L (ref 2–11)
AST SERPL-CCNC: 23 U/L (ref 15–37)
BASOPHILS # BLD: 0 K/UL (ref 0–0.2)
BASOPHILS NFR BLD: 0 % (ref 0–2)
BILIRUB SERPL-MCNC: 0.4 MG/DL (ref 0.2–1.1)
BUN SERPL-MCNC: 32 MG/DL (ref 8–23)
CALCIUM SERPL-MCNC: 8.9 MG/DL (ref 8.3–10.4)
CHLORIDE SERPL-SCNC: 103 MMOL/L (ref 101–110)
CO2 SERPL-SCNC: 29 MMOL/L (ref 21–32)
CREAT SERPL-MCNC: 0.9 MG/DL (ref 0.8–1.5)
DIFFERENTIAL METHOD BLD: ABNORMAL
EOSINOPHIL # BLD: 0.1 K/UL (ref 0–0.8)
EOSINOPHIL NFR BLD: 2 % (ref 0.5–7.8)
ERYTHROCYTE [DISTWIDTH] IN BLOOD BY AUTOMATED COUNT: 16.6 % (ref 11.9–14.6)
ERYTHROCYTE [SEDIMENTATION RATE] IN BLOOD: 3 MM/HR (ref 0–20)
FERRITIN SERPL-MCNC: 54 NG/ML (ref 8–388)
GLOBULIN SER CALC-MCNC: 2.8 G/DL (ref 2.8–4.5)
GLUCOSE SERPL-MCNC: 103 MG/DL (ref 65–100)
HCT VFR BLD AUTO: 27.3 % (ref 41.1–50.3)
HGB BLD-MCNC: 9.1 G/DL (ref 13.6–17.2)
IMM GRANULOCYTES # BLD AUTO: 0 K/UL (ref 0–0.5)
IMM GRANULOCYTES NFR BLD AUTO: 0 % (ref 0–5)
IRON SATN MFR SERPL: 23 %
IRON SERPL-MCNC: 87 UG/DL (ref 35–150)
LYMPHOCYTES # BLD: 1.1 K/UL (ref 0.5–4.6)
LYMPHOCYTES NFR BLD: 20 % (ref 13–44)
MCH RBC QN AUTO: 33.6 PG (ref 26.1–32.9)
MCHC RBC AUTO-ENTMCNC: 33.3 G/DL (ref 31.4–35)
MCV RBC AUTO: 100.7 FL (ref 82–102)
MONOCYTES # BLD: 0.6 K/UL (ref 0.1–1.3)
MONOCYTES NFR BLD: 11 % (ref 4–12)
NEUTS SEG # BLD: 3.6 K/UL (ref 1.7–8.2)
NEUTS SEG NFR BLD: 67 % (ref 43–78)
NRBC # BLD: 0 K/UL (ref 0–0.2)
PLATELET # BLD AUTO: 137 K/UL (ref 150–450)
PMV BLD AUTO: 10.2 FL (ref 9.4–12.3)
POTASSIUM SERPL-SCNC: 4.4 MMOL/L (ref 3.5–5.1)
PROT SERPL-MCNC: 6.6 G/DL (ref 6.3–8.2)
RBC # BLD AUTO: 2.71 M/UL (ref 4.23–5.6)
SODIUM SERPL-SCNC: 138 MMOL/L (ref 133–143)
TIBC SERPL-MCNC: 371 UG/DL (ref 250–450)
WBC # BLD AUTO: 5.3 K/UL (ref 4.3–11.1)

## 2023-11-07 PROCEDURE — 1123F ACP DISCUSS/DSCN MKR DOCD: CPT | Performed by: INTERNAL MEDICINE

## 2023-11-07 PROCEDURE — 3078F DIAST BP <80 MM HG: CPT | Performed by: INTERNAL MEDICINE

## 2023-11-07 PROCEDURE — 36415 COLL VENOUS BLD VENIPUNCTURE: CPT

## 2023-11-07 PROCEDURE — 83550 IRON BINDING TEST: CPT

## 2023-11-07 PROCEDURE — 80053 COMPREHEN METABOLIC PANEL: CPT

## 2023-11-07 PROCEDURE — 3074F SYST BP LT 130 MM HG: CPT | Performed by: INTERNAL MEDICINE

## 2023-11-07 PROCEDURE — 85025 COMPLETE CBC W/AUTO DIFF WBC: CPT

## 2023-11-07 PROCEDURE — 85652 RBC SED RATE AUTOMATED: CPT

## 2023-11-07 PROCEDURE — 99214 OFFICE O/P EST MOD 30 MIN: CPT | Performed by: INTERNAL MEDICINE

## 2023-11-07 PROCEDURE — G8484 FLU IMMUNIZE NO ADMIN: HCPCS | Performed by: INTERNAL MEDICINE

## 2023-11-07 PROCEDURE — G8417 CALC BMI ABV UP PARAM F/U: HCPCS | Performed by: INTERNAL MEDICINE

## 2023-11-07 PROCEDURE — 83540 ASSAY OF IRON: CPT

## 2023-11-07 PROCEDURE — G8427 DOCREV CUR MEDS BY ELIG CLIN: HCPCS | Performed by: INTERNAL MEDICINE

## 2023-11-07 PROCEDURE — 82728 ASSAY OF FERRITIN: CPT

## 2023-11-07 PROCEDURE — 1036F TOBACCO NON-USER: CPT | Performed by: INTERNAL MEDICINE

## 2023-11-07 RX ORDER — HYDROCHLOROTHIAZIDE 12.5 MG/1
CAPSULE, GELATIN COATED ORAL
COMMUNITY
Start: 2023-10-25

## 2023-11-07 ASSESSMENT — PATIENT HEALTH QUESTIONNAIRE - PHQ9
SUM OF ALL RESPONSES TO PHQ QUESTIONS 1-9: 0
2. FEELING DOWN, DEPRESSED OR HOPELESS: 0
SUM OF ALL RESPONSES TO PHQ9 QUESTIONS 1 & 2: 0
1. LITTLE INTEREST OR PLEASURE IN DOING THINGS: 0
SUM OF ALL RESPONSES TO PHQ QUESTIONS 1-9: 0

## 2023-11-07 NOTE — PROGRESS NOTES
versus simple monitoring  given patient is completely asymptomatic. Patient would prefer the latter approach of simple monitoring going forward. RTC in 4 months with labs, ferritin levels. 11/8/19: Denies any new complaints. Hemoglobin stable. No other cytopenias. Ferritin acceptable range. Will continue to hold off further phlebotomies given hemoglobin is borderline. Recent  CT chest with resolution of bibasilar infiltrates, patient reassured. 10/22/2020: Reports some RUQ discomfort, worse with dinner, for over a month or so. Advised to follow-up with primary care for this. Weight about the same. Remains on vitamin D replacement as well as multiple supplements. Advised to make sure they  do not have cobalt/chromium or supplemental iron. Recent routine labs with stable counts, hemoglobin at 10.3. Adequate WBC and platelets. Ferritin at 139, reasonable, given anemia will hold off on further phlebotomy for now. As such continue simple  monitoring. He should continue with his vitamin D 1000 mg daily also. RTC in 4 months with labs, iron stores. 3/4/2021: Patient seen over telemedicine. Denies any new complaints. Recently underwent COVID-19 vaccinations. Remains on vitamin D supplementation. Recent labs with hemoglobin 9.5, mostly stable though over the years with a slow trend downwards. Other cell lines unremarkable. Ferritin acceptable at 120. Continue ongoing monitoring. 8/3/2021: Reports doing well. Some fatigue which she attributes to getting older. Denies any bleeding. Blood work with hemoglobin mostly stable at 9.3, other cell lines unremarkable. Ferritin reasonable at 105. Continue simple monitoring. Last bone  marrow biopsy was in 2017, will consider repeat in the coming year per patient preference. 12/17/2021: Reports doing reasonably. Some fatigue, and leg weakness, working on mobility at home. Regularly eats various berries for his breakfast to maintain good diet.

## 2023-11-07 NOTE — PATIENT INSTRUCTIONS
Patient Instructions from Today's Visit    Reason for Visit:  Follow up    Plan:  Labs look good today, hemoglobin is up to 9.1 and ferritin is staying under 100. We can consider starting you back on procrit since your hemoglobin is continuously staying below 10 which is the cut off for procrit. There is also a new medication called luspatercept that we could consider. We will discuss this at your next appt or you can call us if you would like to talk about this sooner. Follow Up:   Follow up in about 3 months    Recent Lab Results:  Hospital Outpatient Visit on 11/07/2023   Component Date Value Ref Range Status    WBC 11/07/2023 5.3  4.3 - 11.1 K/uL Final    RBC 11/07/2023 2.71 (L)  4.23 - 5.6 M/uL Final    Hemoglobin 11/07/2023 9.1 (L)  13.6 - 17.2 g/dL Final    Hematocrit 11/07/2023 27.3 (L)  41.1 - 50.3 % Final    MCV 11/07/2023 100.7  82.0 - 102.0 FL Final    MCH 11/07/2023 33.6 (H)  26.1 - 32.9 PG Final    MCHC 11/07/2023 33.3  31.4 - 35.0 g/dL Final    RDW 11/07/2023 16.6 (H)  11.9 - 14.6 % Final    Platelets 69/31/7565 137 (L)  150 - 450 K/uL Final    MPV 11/07/2023 10.2  9.4 - 12.3 FL Final    nRBC 11/07/2023 0.00  0.0 - 0.2 K/uL Final    **Note: Absolute NRBC parameter is now reported with Hemogram**    Neutrophils % 11/07/2023 67  43 - 78 % Final    Lymphocytes % 11/07/2023 20  13 - 44 % Final    Monocytes % 11/07/2023 11  4.0 - 12.0 % Final    Eosinophils % 11/07/2023 2  0.5 - 7.8 % Final    Basophils % 11/07/2023 0  0.0 - 2.0 % Final    Immature Granulocytes 11/07/2023 0  0.0 - 5.0 % Final    Neutrophils Absolute 11/07/2023 3.6  1.7 - 8.2 K/UL Final    Lymphocytes Absolute 11/07/2023 1.1  0.5 - 4.6 K/UL Final    Monocytes Absolute 11/07/2023 0.6  0.1 - 1.3 K/UL Final    Eosinophils Absolute 11/07/2023 0.1  0.0 - 0.8 K/UL Final    Basophils Absolute 11/07/2023 0.0  0.0 - 0.2 K/UL Final    Absolute Immature Granulocyte 11/07/2023 0.0  0.0 - 0.5 K/UL Final    Differential Type 11/07/2023 AUTOMATED

## 2023-12-27 ENCOUNTER — TELEPHONE (OUTPATIENT)
Dept: ONCOLOGY | Age: 83
End: 2023-12-27

## 2023-12-27 NOTE — TELEPHONE ENCOUNTER
Subjective    Chief Complaint: Fatigue, anemia  Details of Complaint: Patient called to report he was in [facility] ER requiring blood transfusion. Confirmed patient had blood loss in bowel movements. GI involved in care. Objective    Date of last Office Visit: 23   Date of last labs: 23   Date of last imagin23  Date of last treatment, if applicable:na      Plan/Intervention    Proposed Plan: Instructed patient to keep PCP appt scheduled for tomorrow and GI follow-up previously scheduled.   Patient verbalized understanding of plan: YES/NO: Yes  Patient voiced intended compliance with plan: Verbalized/ Refused: Verbalized intended compliance

## 2023-12-27 NOTE — TELEPHONE ENCOUNTER
Physician provider: Leticia Mckeon MD  Reason for today's call: Appt low hemoglobin  Last office visit: n/a      Pt called stating his hemoglobin was about 8 something in the ER - 12.17.23 after he received blood transfusion. Pt states he is feeling weaker since then. Pt is asking if he needs to come in sooner for appt.

## 2024-02-01 ENCOUNTER — TELEPHONE (OUTPATIENT)
Dept: ONCOLOGY | Age: 84
End: 2024-02-01

## 2024-02-01 DIAGNOSIS — E83.19 IRON OVERLOAD: ICD-10-CM

## 2024-02-01 DIAGNOSIS — D46.9 MYELODYSPLASTIC SYNDROME (HCC): Primary | ICD-10-CM

## 2024-02-01 NOTE — TELEPHONE ENCOUNTER
Physician provider: Bertrand Abdalla MD  Reason for today's call: Needs order for labs  Last office visit:n/a    Pt's Family called to F/U on order for labs at [facility] to be faxed to: [phone]. Per caller, office states order has not been received yet. She asks that it be re-faxed for his 02.07.24 upcoming appt.    no

## 2024-02-01 NOTE — TELEPHONE ENCOUNTER
Patients appointment with Dr. Abdalla is on 2/7/24 which has been changed to a VV. Yanira is inquiring what labs need to be drawn and can we fax orders to [facility] and they can draw them prior to his VV. Reviewed with Norma and will place orders and fax to number supplied  Orders faxed and confirmation received. Call to Yanira to make aware. Left message on VM

## 2024-02-07 ENCOUNTER — TELEMEDICINE (OUTPATIENT)
Dept: ONCOLOGY | Age: 84
End: 2024-02-07
Payer: MEDICARE

## 2024-02-07 DIAGNOSIS — D63.1 ANEMIA OF RENAL DISEASE: ICD-10-CM

## 2024-02-07 DIAGNOSIS — N18.9 ANEMIA OF RENAL DISEASE: ICD-10-CM

## 2024-02-07 DIAGNOSIS — E83.19 IRON OVERLOAD: ICD-10-CM

## 2024-02-07 DIAGNOSIS — R53.83 FATIGUE, UNSPECIFIED TYPE: ICD-10-CM

## 2024-02-07 DIAGNOSIS — D46.9 MYELODYSPLASTIC SYNDROME (HCC): Primary | ICD-10-CM

## 2024-02-07 PROCEDURE — 1036F TOBACCO NON-USER: CPT | Performed by: INTERNAL MEDICINE

## 2024-02-07 PROCEDURE — G8484 FLU IMMUNIZE NO ADMIN: HCPCS | Performed by: INTERNAL MEDICINE

## 2024-02-07 PROCEDURE — 1123F ACP DISCUSS/DSCN MKR DOCD: CPT | Performed by: INTERNAL MEDICINE

## 2024-02-07 PROCEDURE — G8427 DOCREV CUR MEDS BY ELIG CLIN: HCPCS | Performed by: INTERNAL MEDICINE

## 2024-02-07 PROCEDURE — 99214 OFFICE O/P EST MOD 30 MIN: CPT | Performed by: INTERNAL MEDICINE

## 2024-02-07 PROCEDURE — G8417 CALC BMI ABV UP PARAM F/U: HCPCS | Performed by: INTERNAL MEDICINE

## 2024-02-07 RX ORDER — ACETAMINOPHEN 325 MG/1
650 TABLET ORAL ONCE
OUTPATIENT
Start: 2024-02-13 | End: 2024-02-13

## 2024-02-07 RX ORDER — ACETAMINOPHEN 325 MG/1
650 TABLET ORAL
OUTPATIENT
Start: 2024-02-13

## 2024-02-07 RX ORDER — SODIUM CHLORIDE 9 MG/ML
25 INJECTION, SOLUTION INTRAVENOUS PRN
OUTPATIENT
Start: 2024-02-13

## 2024-02-07 RX ORDER — SODIUM CHLORIDE 0.9 % (FLUSH) 0.9 %
5-40 SYRINGE (ML) INJECTION PRN
OUTPATIENT
Start: 2024-02-13

## 2024-02-07 RX ORDER — DIPHENHYDRAMINE HCL 25 MG
25 CAPSULE ORAL ONCE
OUTPATIENT
Start: 2024-02-13 | End: 2024-02-13

## 2024-02-07 RX ORDER — EPINEPHRINE 1 MG/ML
0.3 INJECTION, SOLUTION, CONCENTRATE INTRAVENOUS PRN
OUTPATIENT
Start: 2024-02-13

## 2024-02-07 RX ORDER — ONDANSETRON 2 MG/ML
8 INJECTION INTRAMUSCULAR; INTRAVENOUS
OUTPATIENT
Start: 2024-02-13

## 2024-02-07 RX ORDER — DIPHENHYDRAMINE HYDROCHLORIDE 50 MG/ML
50 INJECTION INTRAMUSCULAR; INTRAVENOUS
OUTPATIENT
Start: 2024-02-13

## 2024-02-07 RX ORDER — ALBUTEROL SULFATE 90 UG/1
4 AEROSOL, METERED RESPIRATORY (INHALATION) PRN
OUTPATIENT
Start: 2024-02-13

## 2024-02-07 RX ORDER — SODIUM CHLORIDE 9 MG/ML
INJECTION, SOLUTION INTRAVENOUS CONTINUOUS
OUTPATIENT
Start: 2024-02-13

## 2024-02-07 RX ORDER — SODIUM CHLORIDE 9 MG/ML
20 INJECTION, SOLUTION INTRAVENOUS CONTINUOUS
OUTPATIENT
Start: 2024-02-13

## 2024-02-07 NOTE — PATIENT INSTRUCTIONS
Patient Instructions from Today's Visit    Reason for Visit:  Follow up    Plan:  Your hemoglobin is low.  We will set you up for 2 units of blood.   We want you to get your CBC checked again in about 10 days to make sure hemoglobin is coming up.  We will set you in the office in 3 weeks to discuss next steps.      Follow Up:  Follow up in 3 weeks    Recent Lab Results:  No visits with results within 3 Day(s) from this visit.   Latest known visit with results is:   Hospital Outpatient Visit on 11/07/2023   Component Date Value Ref Range Status    WBC 11/07/2023 5.3  4.3 - 11.1 K/uL Final    RBC 11/07/2023 2.71 (L)  4.23 - 5.6 M/uL Final    Hemoglobin 11/07/2023 9.1 (L)  13.6 - 17.2 g/dL Final    Hematocrit 11/07/2023 27.3 (L)  41.1 - 50.3 % Final    MCV 11/07/2023 100.7  82.0 - 102.0 FL Final    MCH 11/07/2023 33.6 (H)  26.1 - 32.9 PG Final    MCHC 11/07/2023 33.3  31.4 - 35.0 g/dL Final    RDW 11/07/2023 16.6 (H)  11.9 - 14.6 % Final    Platelets 11/07/2023 137 (L)  150 - 450 K/uL Final    MPV 11/07/2023 10.2  9.4 - 12.3 FL Final    nRBC 11/07/2023 0.00  0.0 - 0.2 K/uL Final    **Note: Absolute NRBC parameter is now reported with Hemogram**    Neutrophils % 11/07/2023 67  43 - 78 % Final    Lymphocytes % 11/07/2023 20  13 - 44 % Final    Monocytes % 11/07/2023 11  4.0 - 12.0 % Final    Eosinophils % 11/07/2023 2  0.5 - 7.8 % Final    Basophils % 11/07/2023 0  0.0 - 2.0 % Final    Immature Granulocytes 11/07/2023 0  0.0 - 5.0 % Final    Neutrophils Absolute 11/07/2023 3.6  1.7 - 8.2 K/UL Final    Lymphocytes Absolute 11/07/2023 1.1  0.5 - 4.6 K/UL Final    Monocytes Absolute 11/07/2023 0.6  0.1 - 1.3 K/UL Final    Eosinophils Absolute 11/07/2023 0.1  0.0 - 0.8 K/UL Final    Basophils Absolute 11/07/2023 0.0  0.0 - 0.2 K/UL Final    Absolute Immature Granulocyte 11/07/2023 0.0  0.0 - 0.5 K/UL Final    Differential Type 11/07/2023 AUTOMATED    Final    Sodium 11/07/2023 138  133 - 143 mmol/L Final    Potassium

## 2024-02-07 NOTE — PROGRESS NOTES
liver consistent w iron overload: continue w monthly phelbotomies (goal ferritin below 100, hold for hgb below 10). Notes taking  centrum silver w iron in it which he only just realised and has now stopped.    10/31/16: labs w stable hgb, has only been able to tolerate therapy every other month. Ferritin dropping.    3/23/17: Clinically well. Ferritin in 700 range. No phlebotomies since last visit. Repeat BM biopsy read as RARS w increased iron stores,  rare blasts. Cytogenetics & FISH pending. Continue phlebotomy every other month.    07/17: Doing well. Phlebotomy x 1 since last visit. Ferritin nicely dropping. Hgb stable. BM w complex cytogenetics, FISH w 20q(del).    11/17: No new complaints. Labs stable. Ferritin falling, hgb stable.    03/18: Doing well. Exam and labs ok. Ferritin stable in 200 range. Continue every other month phlebotomy.    06/18: Reports doing well. Has seen primary care and ortho for RT groin discomfort with w/u -ve to-date. Labs stable. Hgb around 10, did not recieve phlebotomy last time related to holding parameters. Continue current care. Requesting to check cobalt/chromium.  May consider Exjade if iron stores start rising and he cannot tolerate phlebotomies. CBC 2 months. RTC in 4 months w labs, Liver MRI (assess iron status). Planning cruise to [place].    10/18: Reports recent hospitalization for bowel obstruction which was conservatively managed. Feels somewhat bloated at times but otherwise moving bowels. Reports no clear etiology of bowel obstruction. Seen earlier than scheduled as hgb on last check  (after hospitalization) had dropped down to 8.3 however hgb today now improved to 9.1. Continue simple monitoring. Ferritin in 200-300 range. On last check his cobalt/chromium levels were elevated and he is told to discuss results w his ortho.    02/19: Denies new complaints. He was recently admitted 12/18 for small bowel obstruction felt related to incarcerated hernia which was

## 2024-02-13 ENCOUNTER — HOSPITAL ENCOUNTER (OUTPATIENT)
Dept: INFUSION THERAPY | Age: 84
Setting detail: INFUSION SERIES
Discharge: HOME | End: 2024-02-13
Payer: MEDICARE

## 2024-02-13 VITALS
DIASTOLIC BLOOD PRESSURE: 71 MMHG | OXYGEN SATURATION: 97 % | TEMPERATURE: 98.1 F | HEART RATE: 80 BPM | RESPIRATION RATE: 16 BRPM | SYSTOLIC BLOOD PRESSURE: 143 MMHG

## 2024-02-13 DIAGNOSIS — D63.1 ANEMIA OF RENAL DISEASE: Primary | ICD-10-CM

## 2024-02-13 DIAGNOSIS — N18.9 ANEMIA OF RENAL DISEASE: Primary | ICD-10-CM

## 2024-02-13 DIAGNOSIS — D46.9 MYELODYSPLASTIC SYNDROME (HCC): ICD-10-CM

## 2024-02-13 LAB
BASOPHILS # BLD: 0 K/UL (ref 0–0.2)
BASOPHILS NFR BLD: 0 % (ref 0–2)
DIFFERENTIAL METHOD BLD: ABNORMAL
EOSINOPHIL # BLD: 0 K/UL (ref 0–0.8)
EOSINOPHIL NFR BLD: 0 % (ref 0.5–7.8)
ERYTHROCYTE [DISTWIDTH] IN BLOOD BY AUTOMATED COUNT: 20.1 % (ref 11.9–14.6)
HCT VFR BLD AUTO: 24.1 % (ref 41.1–50.3)
HGB BLD-MCNC: 7.6 G/DL (ref 13.6–17.2)
HISTORY CHECK: NORMAL
IMM GRANULOCYTES # BLD AUTO: 0.1 K/UL (ref 0–0.5)
IMM GRANULOCYTES NFR BLD AUTO: 1 % (ref 0–5)
LYMPHOCYTES # BLD: 0.6 K/UL (ref 0.5–4.6)
LYMPHOCYTES NFR BLD: 7 % (ref 13–44)
MCH RBC QN AUTO: 29.6 PG (ref 26.1–32.9)
MCHC RBC AUTO-ENTMCNC: 31.5 G/DL (ref 31.4–35)
MCV RBC AUTO: 93.8 FL (ref 82–102)
MONOCYTES # BLD: 0.4 K/UL (ref 0.1–1.3)
MONOCYTES NFR BLD: 5 % (ref 4–12)
NEUTS SEG # BLD: 8 K/UL (ref 1.7–8.2)
NEUTS SEG NFR BLD: 87 % (ref 43–78)
NRBC # BLD: 0 K/UL (ref 0–0.2)
PLATELET # BLD AUTO: 154 K/UL (ref 150–450)
PMV BLD AUTO: 9.9 FL (ref 9.4–12.3)
RBC # BLD AUTO: 2.57 M/UL (ref 4.23–5.6)
WBC # BLD AUTO: 9.1 K/UL (ref 4.3–11.1)

## 2024-02-13 PROCEDURE — 86850 RBC ANTIBODY SCREEN: CPT

## 2024-02-13 PROCEDURE — 2580000003 HC RX 258: Performed by: INTERNAL MEDICINE

## 2024-02-13 PROCEDURE — 85025 COMPLETE CBC W/AUTO DIFF WBC: CPT

## 2024-02-13 PROCEDURE — 86901 BLOOD TYPING SEROLOGIC RH(D): CPT

## 2024-02-13 PROCEDURE — 86900 BLOOD TYPING SEROLOGIC ABO: CPT

## 2024-02-13 PROCEDURE — 6370000000 HC RX 637 (ALT 250 FOR IP): Performed by: INTERNAL MEDICINE

## 2024-02-13 PROCEDURE — P9040 RBC LEUKOREDUCED IRRADIATED: HCPCS

## 2024-02-13 PROCEDURE — 36430 TRANSFUSION BLD/BLD COMPNT: CPT

## 2024-02-13 PROCEDURE — 86923 COMPATIBILITY TEST ELECTRIC: CPT

## 2024-02-13 RX ORDER — ACETAMINOPHEN 325 MG/1
650 TABLET ORAL ONCE
Status: COMPLETED | OUTPATIENT
Start: 2024-02-13 | End: 2024-02-13

## 2024-02-13 RX ORDER — SODIUM CHLORIDE 9 MG/ML
20 INJECTION, SOLUTION INTRAVENOUS CONTINUOUS
Status: CANCELLED | OUTPATIENT
Start: 2024-02-13

## 2024-02-13 RX ORDER — SODIUM CHLORIDE 9 MG/ML
25 INJECTION, SOLUTION INTRAVENOUS PRN
Status: CANCELLED | OUTPATIENT
Start: 2024-02-13

## 2024-02-13 RX ORDER — SODIUM CHLORIDE 0.9 % (FLUSH) 0.9 %
5-40 SYRINGE (ML) INJECTION PRN
Status: DISCONTINUED | OUTPATIENT
Start: 2024-02-13 | End: 2024-02-14 | Stop reason: HOSPADM

## 2024-02-13 RX ORDER — SODIUM CHLORIDE 9 MG/ML
INJECTION, SOLUTION INTRAVENOUS CONTINUOUS
OUTPATIENT
Start: 2024-02-13

## 2024-02-13 RX ORDER — DIPHENHYDRAMINE HCL 25 MG
25 CAPSULE ORAL ONCE
Status: COMPLETED | OUTPATIENT
Start: 2024-02-13 | End: 2024-02-13

## 2024-02-13 RX ORDER — SODIUM CHLORIDE 9 MG/ML
20 INJECTION, SOLUTION INTRAVENOUS CONTINUOUS
Status: DISCONTINUED | OUTPATIENT
Start: 2024-02-13 | End: 2024-02-14 | Stop reason: HOSPADM

## 2024-02-13 RX ORDER — EPINEPHRINE 1 MG/ML
0.3 INJECTION, SOLUTION, CONCENTRATE INTRAVENOUS PRN
OUTPATIENT
Start: 2024-02-13

## 2024-02-13 RX ORDER — ACETAMINOPHEN 325 MG/1
650 TABLET ORAL
OUTPATIENT
Start: 2024-02-13

## 2024-02-13 RX ORDER — SODIUM CHLORIDE 9 MG/ML
25 INJECTION, SOLUTION INTRAVENOUS PRN
Status: DISCONTINUED | OUTPATIENT
Start: 2024-02-13 | End: 2024-02-14 | Stop reason: HOSPADM

## 2024-02-13 RX ORDER — DIPHENHYDRAMINE HYDROCHLORIDE 50 MG/ML
50 INJECTION INTRAMUSCULAR; INTRAVENOUS
OUTPATIENT
Start: 2024-02-13

## 2024-02-13 RX ORDER — ALBUTEROL SULFATE 90 UG/1
4 AEROSOL, METERED RESPIRATORY (INHALATION) PRN
OUTPATIENT
Start: 2024-02-13

## 2024-02-13 RX ORDER — SODIUM CHLORIDE 0.9 % (FLUSH) 0.9 %
5-40 SYRINGE (ML) INJECTION PRN
Status: CANCELLED | OUTPATIENT
Start: 2024-02-13

## 2024-02-13 RX ORDER — DIPHENHYDRAMINE HCL 25 MG
25 CAPSULE ORAL ONCE
Status: CANCELLED | OUTPATIENT
Start: 2024-02-13 | End: 2024-02-13

## 2024-02-13 RX ORDER — ACETAMINOPHEN 325 MG/1
650 TABLET ORAL ONCE
Status: CANCELLED | OUTPATIENT
Start: 2024-02-13 | End: 2024-02-13

## 2024-02-13 RX ORDER — ONDANSETRON 2 MG/ML
8 INJECTION INTRAMUSCULAR; INTRAVENOUS
OUTPATIENT
Start: 2024-02-13

## 2024-02-13 RX ADMIN — ACETAMINOPHEN 650 MG: 325 TABLET ORAL at 13:36

## 2024-02-13 RX ADMIN — DIPHENHYDRAMINE HYDROCHLORIDE 25 MG: 25 CAPSULE ORAL at 13:36

## 2024-02-13 RX ADMIN — SODIUM CHLORIDE, PRESERVATIVE FREE 10 ML: 5 INJECTION INTRAVENOUS at 13:37

## 2024-02-13 RX ADMIN — SODIUM CHLORIDE 20 ML/HR: 9 INJECTION, SOLUTION INTRAVENOUS at 13:37

## 2024-02-13 RX ADMIN — SODIUM CHLORIDE, PRESERVATIVE FREE 10 ML: 5 INJECTION INTRAVENOUS at 17:35

## 2024-02-13 NOTE — PROGRESS NOTES
Arrived to the [facility] via stretcher.  Labs+2U PRBC completed. Patient tolerated well.   Any issues or concerns during appointment: none.  Consent for blood transfusions read over and verbal consent given from patient, but patient unable to sign and no one able to sign for patient. Blood consent signed with 2 RN's and blood administration discharge instructions given to patient when leaving facility.   Patient aware of next lab and [facility] office visit on 2/28/24 (date) at 1050 (time).  Patient instructed to call provider with temperature of 100.4 or greater or nausea/vomiting/ diarrhea or pain not controlled by medications  2nd unit blood almost finished. Patient will be discharged via stretcher with transportation when they arrive.

## 2024-02-13 NOTE — PROGRESS NOTES
Verbal report received from MLYENE Mcguire at 1725 and assumed care at that time. 2nd unit of PRBCs completed. PIV removed. Patient discharged via stretcher with transport service at 1805.

## 2024-02-14 LAB
ABO + RH BLD: NORMAL
BLD PROD TYP BPU: NORMAL
BLD PROD TYP BPU: NORMAL
BLOOD BANK DISPENSE STATUS: NORMAL
BLOOD BANK DISPENSE STATUS: NORMAL
BLOOD GROUP ANTIBODIES SERPL: NORMAL
BPU ID: NORMAL
BPU ID: NORMAL
CROSSMATCH RESULT: NORMAL
CROSSMATCH RESULT: NORMAL
SPECIMEN EXP DATE BLD: NORMAL
UNIT DIVISION: 0
UNIT DIVISION: 0

## 2024-04-08 DIAGNOSIS — N18.9 ANEMIA OF RENAL DISEASE: ICD-10-CM

## 2024-04-08 DIAGNOSIS — D63.1 ANEMIA OF RENAL DISEASE: ICD-10-CM

## 2024-04-08 DIAGNOSIS — D46.9 MYELODYSPLASTIC SYNDROME (HCC): Primary | ICD-10-CM

## 2024-04-09 ENCOUNTER — HOSPITAL ENCOUNTER (OUTPATIENT)
Dept: LAB | Age: 84
Discharge: HOME | End: 2024-04-12
Payer: MEDICARE

## 2024-04-09 ENCOUNTER — OFFICE VISIT (OUTPATIENT)
Dept: ONCOLOGY | Age: 84
End: 2024-04-09
Payer: MEDICARE

## 2024-04-09 VITALS
TEMPERATURE: 97.7 F | DIASTOLIC BLOOD PRESSURE: 49 MMHG | BODY MASS INDEX: 30.68 KG/M2 | HEIGHT: 60 IN | SYSTOLIC BLOOD PRESSURE: 92 MMHG | RESPIRATION RATE: 16 BRPM | HEART RATE: 77 BPM | OXYGEN SATURATION: 97 %

## 2024-04-09 DIAGNOSIS — D46.9 MYELODYSPLASTIC SYNDROME (HCC): ICD-10-CM

## 2024-04-09 DIAGNOSIS — R53.83 FATIGUE, UNSPECIFIED TYPE: ICD-10-CM

## 2024-04-09 DIAGNOSIS — D63.1 ANEMIA OF RENAL DISEASE: ICD-10-CM

## 2024-04-09 DIAGNOSIS — D46.9 MYELODYSPLASTIC SYNDROME (HCC): Primary | ICD-10-CM

## 2024-04-09 DIAGNOSIS — N18.9 ANEMIA OF RENAL DISEASE: ICD-10-CM

## 2024-04-09 DIAGNOSIS — E83.19 IRON OVERLOAD: ICD-10-CM

## 2024-04-09 LAB
ALBUMIN SERPL-MCNC: 2.7 G/DL (ref 3.2–4.6)
ALBUMIN/GLOB SERPL: 0.7 (ref 0.4–1.6)
ALP SERPL-CCNC: 146 U/L (ref 50–136)
ALT SERPL-CCNC: 21 U/L (ref 12–65)
ANION GAP SERPL CALC-SCNC: 7 MMOL/L (ref 2–11)
AST SERPL-CCNC: 20 U/L (ref 15–37)
BASOPHILS # BLD: 0 K/UL (ref 0–0.2)
BASOPHILS NFR BLD: 0 % (ref 0–2)
BILIRUB SERPL-MCNC: 0.3 MG/DL (ref 0.2–1.1)
BUN SERPL-MCNC: 22 MG/DL (ref 8–23)
CALCIUM SERPL-MCNC: 9.1 MG/DL (ref 8.3–10.4)
CHLORIDE SERPL-SCNC: 103 MMOL/L (ref 103–113)
CO2 SERPL-SCNC: 27 MMOL/L (ref 21–32)
CREAT SERPL-MCNC: 0.8 MG/DL (ref 0.8–1.5)
DIFFERENTIAL METHOD BLD: ABNORMAL
EOSINOPHIL # BLD: 0 K/UL (ref 0–0.8)
EOSINOPHIL NFR BLD: 1 % (ref 0.5–7.8)
ERYTHROCYTE [DISTWIDTH] IN BLOOD BY AUTOMATED COUNT: 20.3 % (ref 11.9–14.6)
ERYTHROCYTE [SEDIMENTATION RATE] IN BLOOD: 42 MM/HR (ref 0–20)
FERRITIN SERPL-MCNC: 1221 NG/ML (ref 8–388)
GLOBULIN SER CALC-MCNC: 4.1 G/DL (ref 2.8–4.5)
GLUCOSE SERPL-MCNC: 147 MG/DL (ref 65–100)
HCT VFR BLD AUTO: 22.3 % (ref 41.1–50.3)
HGB BLD-MCNC: 7.2 G/DL (ref 13.6–17.2)
IMM GRANULOCYTES # BLD AUTO: 0 K/UL (ref 0–0.5)
IMM GRANULOCYTES NFR BLD AUTO: 0 % (ref 0–5)
IRON SATN MFR SERPL: 32 %
IRON SERPL-MCNC: 68 UG/DL (ref 35–150)
LYMPHOCYTES # BLD: 0.7 K/UL (ref 0.5–4.6)
LYMPHOCYTES NFR BLD: 13 % (ref 13–44)
MCH RBC QN AUTO: 30.8 PG (ref 26.1–32.9)
MCHC RBC AUTO-ENTMCNC: 32.3 G/DL (ref 31.4–35)
MCV RBC AUTO: 95.3 FL (ref 82–102)
MONOCYTES # BLD: 0.4 K/UL (ref 0.1–1.3)
MONOCYTES NFR BLD: 7 % (ref 4–12)
NEUTS SEG # BLD: 4.3 K/UL (ref 1.7–8.2)
NEUTS SEG NFR BLD: 79 % (ref 43–78)
NRBC # BLD: 0 K/UL (ref 0–0.2)
PLATELET # BLD AUTO: 193 K/UL (ref 150–450)
PMV BLD AUTO: 9.5 FL (ref 9.4–12.3)
POTASSIUM SERPL-SCNC: 3.7 MMOL/L (ref 3.5–5.1)
PROT SERPL-MCNC: 6.8 G/DL (ref 6.3–8.2)
RBC # BLD AUTO: 2.34 M/UL (ref 4.23–5.6)
SODIUM SERPL-SCNC: 137 MMOL/L (ref 136–146)
TIBC SERPL-MCNC: 211 UG/DL (ref 250–450)
WBC # BLD AUTO: 5.5 K/UL (ref 4.3–11.1)

## 2024-04-09 PROCEDURE — G8427 DOCREV CUR MEDS BY ELIG CLIN: HCPCS | Performed by: INTERNAL MEDICINE

## 2024-04-09 PROCEDURE — 86900 BLOOD TYPING SEROLOGIC ABO: CPT

## 2024-04-09 PROCEDURE — 83550 IRON BINDING TEST: CPT

## 2024-04-09 PROCEDURE — 82668 ASSAY OF ERYTHROPOIETIN: CPT

## 2024-04-09 PROCEDURE — 80053 COMPREHEN METABOLIC PANEL: CPT

## 2024-04-09 PROCEDURE — G8417 CALC BMI ABV UP PARAM F/U: HCPCS | Performed by: INTERNAL MEDICINE

## 2024-04-09 PROCEDURE — 99214 OFFICE O/P EST MOD 30 MIN: CPT | Performed by: INTERNAL MEDICINE

## 2024-04-09 PROCEDURE — 83540 ASSAY OF IRON: CPT

## 2024-04-09 PROCEDURE — 1123F ACP DISCUSS/DSCN MKR DOCD: CPT | Performed by: INTERNAL MEDICINE

## 2024-04-09 PROCEDURE — 86901 BLOOD TYPING SEROLOGIC RH(D): CPT

## 2024-04-09 PROCEDURE — 36415 COLL VENOUS BLD VENIPUNCTURE: CPT

## 2024-04-09 PROCEDURE — 3078F DIAST BP <80 MM HG: CPT | Performed by: INTERNAL MEDICINE

## 2024-04-09 PROCEDURE — 86850 RBC ANTIBODY SCREEN: CPT

## 2024-04-09 PROCEDURE — 82728 ASSAY OF FERRITIN: CPT

## 2024-04-09 PROCEDURE — 85652 RBC SED RATE AUTOMATED: CPT

## 2024-04-09 PROCEDURE — 86923 COMPATIBILITY TEST ELECTRIC: CPT

## 2024-04-09 PROCEDURE — 1036F TOBACCO NON-USER: CPT | Performed by: INTERNAL MEDICINE

## 2024-04-09 PROCEDURE — 3074F SYST BP LT 130 MM HG: CPT | Performed by: INTERNAL MEDICINE

## 2024-04-09 PROCEDURE — 85025 COMPLETE CBC W/AUTO DIFF WBC: CPT

## 2024-04-09 RX ORDER — POTASSIUM CHLORIDE 1500 MG/1
40 TABLET, EXTENDED RELEASE ORAL 2 TIMES DAILY
COMMUNITY
Start: 2024-02-15

## 2024-04-09 RX ORDER — ACETAMINOPHEN 325 MG/1
650 TABLET ORAL
Status: CANCELLED | OUTPATIENT
Start: 2024-04-11

## 2024-04-09 RX ORDER — ONDANSETRON 2 MG/ML
8 INJECTION INTRAMUSCULAR; INTRAVENOUS
Status: CANCELLED | OUTPATIENT
Start: 2024-04-11

## 2024-04-09 RX ORDER — EPINEPHRINE 1 MG/ML
0.3 INJECTION, SOLUTION, CONCENTRATE INTRAVENOUS PRN
Status: CANCELLED | OUTPATIENT
Start: 2024-04-11

## 2024-04-09 RX ORDER — DIPHENHYDRAMINE HCL 25 MG
25 CAPSULE ORAL ONCE
Status: CANCELLED | OUTPATIENT
Start: 2024-04-11 | End: 2024-04-11

## 2024-04-09 RX ORDER — ALBUTEROL SULFATE 90 UG/1
4 AEROSOL, METERED RESPIRATORY (INHALATION) PRN
Status: CANCELLED | OUTPATIENT
Start: 2024-04-11

## 2024-04-09 RX ORDER — FERROUS SULFATE 325(65) MG
TABLET ORAL
COMMUNITY
Start: 2024-03-08

## 2024-04-09 RX ORDER — SODIUM CHLORIDE 9 MG/ML
INJECTION, SOLUTION INTRAVENOUS CONTINUOUS
OUTPATIENT
Start: 2024-04-11

## 2024-04-09 RX ORDER — SODIUM CHLORIDE 9 MG/ML
25 INJECTION, SOLUTION INTRAVENOUS PRN
Status: CANCELLED | OUTPATIENT
Start: 2024-04-11

## 2024-04-09 RX ORDER — SODIUM CHLORIDE 0.9 % (FLUSH) 0.9 %
5-40 SYRINGE (ML) INJECTION PRN
Status: CANCELLED | OUTPATIENT
Start: 2024-04-11

## 2024-04-09 RX ORDER — SODIUM CHLORIDE 9 MG/ML
20 INJECTION, SOLUTION INTRAVENOUS CONTINUOUS
OUTPATIENT
Start: 2024-04-11

## 2024-04-09 RX ORDER — ACETAMINOPHEN 325 MG/1
650 TABLET ORAL ONCE
Status: CANCELLED | OUTPATIENT
Start: 2024-04-11 | End: 2024-04-11

## 2024-04-09 RX ORDER — DIPHENHYDRAMINE HYDROCHLORIDE 50 MG/ML
50 INJECTION INTRAMUSCULAR; INTRAVENOUS
Status: CANCELLED | OUTPATIENT
Start: 2024-04-11

## 2024-04-09 ASSESSMENT — PATIENT HEALTH QUESTIONNAIRE - PHQ9
SUM OF ALL RESPONSES TO PHQ QUESTIONS 1-9: 0
2. FEELING DOWN, DEPRESSED OR HOPELESS: NOT AT ALL
1. LITTLE INTEREST OR PLEASURE IN DOING THINGS: NOT AT ALL
SUM OF ALL RESPONSES TO PHQ9 QUESTIONS 1 & 2: 0

## 2024-04-09 NOTE — PROGRESS NOTES
LAMINECTOMY  9/2/10    lumbar laminectomy L4 x 2/ rods    NEUROLOGICAL SURGERY  2013    T10-T12 fusion    ORTHOPEDIC SURGERY Right 1985    ankle fusion with hardware     ORTHOPEDIC SURGERY Left 2/14/2011    fusion of bones in foot    ORTHOPEDIC SURGERY  1/2013    rods in back     THORACIC LAMINECTOMY  4/2/09    T12, L2,L3    TOTAL HIP ARTHROPLASTY Bilateral     R in 2003, l in 2014    TOTAL KNEE ARTHROPLASTY Right 12/7/2009    TOTAL KNEE ARTHROPLASTY Left 12/8/2003       Current Outpatient Medications   Medication Sig Dispense Refill    FEROSUL 325 (65 Fe) MG tablet       potassium chloride (K-TAB) 20 MEQ TBCR extended release tablet Take 2 tablets by mouth 2 times daily      hydroCHLOROthiazide (MICROZIDE) 12.5 MG capsule TAKE 1 CAPSULE (12.5 MG TOTAL) BY MOUTH IN THE MORNING      Multiple Vitamins-Minerals (AIRBORNE PO) Take by mouth      Lansoprazole (PREVACID PO) Take by mouth      Multiple Vitamins-Minerals (ABC COMPLETE SENIOR 50+ PO) Take by mouth daily      Multiple Vitamins-Minerals (PRESERVISION AREDS 2+MULTI VIT PO) Take by mouth      folic acid (FOLVITE) 1 MG tablet Take by mouth daily      gabapentin (NEURONTIN) 400 MG capsule 1 po tid      latanoprost (XALATAN) 0.005 % ophthalmic solution Apply 1 drop to eye      pramipexole (MIRAPEX) 0.5 MG tablet 1 po 5 times daily      B Complex Vitamins (VITAMIN B COMPLEX PO) Take by mouth (Patient not taking: Reported on 4/9/2024)      MYRBETRIQ 50 MG TB24 TAKE 1 TABLET EVERY DAY 90 tablet 3    rosuvastatin (CRESTOR) 20 MG tablet Take 1 tablet by mouth daily 90 tablet 3    lisinopril (PRINIVIL;ZESTRIL) 10 MG tablet Take 1 tablet by mouth daily (Patient not taking: Reported on 11/7/2023) 30 tablet 3    tamsulosin (FLOMAX) 0.4 MG capsule Take 1 capsule by mouth nightly 90 capsule 3    NONFORMULARY daily Restless leg vitamin (Patient not taking: Reported on 4/9/2024)      Apoaequorin (PREVAGEN PO) Take by mouth daily (Patient not taking: Reported on 8/18/2023)

## 2024-04-09 NOTE — PATIENT INSTRUCTIONS
Patient Information from Today's Visit    - we will give you one unit of blood    - decrease iron tablet as discussed by Dr. Abdalla     - lab work once a month- you can get this with Dr. Melvin's office. We will call you to let you know whether or not you need blood transfusion. We will decide if you need a bone marrow biopsy before you come back.    Treatment Summary has been discussed and given to patient:No  Follow Up: in 3 months    Please refer to After Visit Summary or [employer] for upcoming appointment information. If you have any questions regarding your upcoming schedule please reach out to your care team through [employer] or call [phone].          -------------------------------------------------------------------------------------------------------------------  Please call our office at [phone] if you have any  of the following symptoms:   Fever of 100.5 or greater  Chills  Shortness of breath  Swelling or pain in one leg    After office hours an answering service is available and will contact a provider for emergencies or if you are experiencing any of the above symptoms.    Patient did express an interest in My Chart.  My Chart log in information explained on the after visit summary printout at the check-out desk.    Norma Goode RN

## 2024-04-10 LAB
EPO SERPL-ACNC: 29.7 MIU/ML (ref 2.6–18.5)
HISTORY CHECK: NORMAL

## 2024-04-10 RX ORDER — DIPHENHYDRAMINE HYDROCHLORIDE 50 MG/ML
50 INJECTION INTRAMUSCULAR; INTRAVENOUS
OUTPATIENT
Start: 2024-04-10

## 2024-04-10 RX ORDER — ALBUTEROL SULFATE 90 UG/1
4 AEROSOL, METERED RESPIRATORY (INHALATION) PRN
OUTPATIENT
Start: 2024-04-10

## 2024-04-10 RX ORDER — SODIUM CHLORIDE 0.9 % (FLUSH) 0.9 %
5-40 SYRINGE (ML) INJECTION PRN
Status: CANCELLED | OUTPATIENT
Start: 2024-04-10

## 2024-04-10 RX ORDER — ACETAMINOPHEN 325 MG/1
650 TABLET ORAL ONCE
Status: CANCELLED | OUTPATIENT
Start: 2024-04-10 | End: 2024-04-10

## 2024-04-10 RX ORDER — DIPHENHYDRAMINE HCL 25 MG
25 CAPSULE ORAL ONCE
Status: CANCELLED | OUTPATIENT
Start: 2024-04-10 | End: 2024-04-10

## 2024-04-10 RX ORDER — EPINEPHRINE 1 MG/ML
0.3 INJECTION, SOLUTION, CONCENTRATE INTRAVENOUS PRN
OUTPATIENT
Start: 2024-04-10

## 2024-04-10 RX ORDER — SODIUM CHLORIDE 9 MG/ML
20 INJECTION, SOLUTION INTRAVENOUS CONTINUOUS
OUTPATIENT
Start: 2024-04-10

## 2024-04-10 RX ORDER — ONDANSETRON 2 MG/ML
8 INJECTION INTRAMUSCULAR; INTRAVENOUS
OUTPATIENT
Start: 2024-04-10

## 2024-04-10 RX ORDER — SODIUM CHLORIDE 9 MG/ML
INJECTION, SOLUTION INTRAVENOUS CONTINUOUS
OUTPATIENT
Start: 2024-04-10

## 2024-04-10 RX ORDER — SODIUM CHLORIDE 9 MG/ML
25 INJECTION, SOLUTION INTRAVENOUS PRN
Status: CANCELLED | OUTPATIENT
Start: 2024-04-10

## 2024-04-10 RX ORDER — ACETAMINOPHEN 325 MG/1
650 TABLET ORAL
OUTPATIENT
Start: 2024-04-10

## 2024-04-11 ENCOUNTER — HOSPITAL ENCOUNTER (OUTPATIENT)
Dept: INFUSION THERAPY | Age: 84
Setting detail: INFUSION SERIES
Discharge: HOME | End: 2024-04-11
Payer: MEDICARE

## 2024-04-11 ENCOUNTER — HOSPITAL ENCOUNTER (OUTPATIENT)
Dept: INFUSION THERAPY | Age: 84
Setting detail: INFUSION SERIES
End: 2024-04-11
Payer: MEDICARE

## 2024-04-11 VITALS
TEMPERATURE: 98 F | RESPIRATION RATE: 19 BRPM | SYSTOLIC BLOOD PRESSURE: 125 MMHG | HEART RATE: 70 BPM | OXYGEN SATURATION: 98 % | DIASTOLIC BLOOD PRESSURE: 67 MMHG

## 2024-04-11 DIAGNOSIS — D63.1 ANEMIA OF RENAL DISEASE: Primary | ICD-10-CM

## 2024-04-11 DIAGNOSIS — N18.9 ANEMIA OF RENAL DISEASE: Primary | ICD-10-CM

## 2024-04-11 DIAGNOSIS — D46.9 MYELODYSPLASTIC SYNDROME (HCC): ICD-10-CM

## 2024-04-11 PROCEDURE — P9040 RBC LEUKOREDUCED IRRADIATED: HCPCS

## 2024-04-11 PROCEDURE — 36430 TRANSFUSION BLD/BLD COMPNT: CPT

## 2024-04-11 PROCEDURE — 6370000000 HC RX 637 (ALT 250 FOR IP): Performed by: INTERNAL MEDICINE

## 2024-04-11 PROCEDURE — 2580000003 HC RX 258: Performed by: INTERNAL MEDICINE

## 2024-04-11 RX ORDER — ACETAMINOPHEN 325 MG/1
650 TABLET ORAL
Status: DISCONTINUED | OUTPATIENT
Start: 2024-04-11 | End: 2024-04-12 | Stop reason: HOSPADM

## 2024-04-11 RX ORDER — EPINEPHRINE 1 MG/ML
0.3 INJECTION, SOLUTION, CONCENTRATE INTRAVENOUS PRN
Status: DISCONTINUED | OUTPATIENT
Start: 2024-04-11 | End: 2024-04-12 | Stop reason: HOSPADM

## 2024-04-11 RX ORDER — DIPHENHYDRAMINE HYDROCHLORIDE 50 MG/ML
50 INJECTION INTRAMUSCULAR; INTRAVENOUS
Status: DISCONTINUED | OUTPATIENT
Start: 2024-04-11 | End: 2024-04-12 | Stop reason: HOSPADM

## 2024-04-11 RX ORDER — ACETAMINOPHEN 325 MG/1
650 TABLET ORAL ONCE
Status: COMPLETED | OUTPATIENT
Start: 2024-04-11 | End: 2024-04-11

## 2024-04-11 RX ORDER — SODIUM CHLORIDE 0.9 % (FLUSH) 0.9 %
5-40 SYRINGE (ML) INJECTION PRN
Status: DISCONTINUED | OUTPATIENT
Start: 2024-04-11 | End: 2024-04-12 | Stop reason: HOSPADM

## 2024-04-11 RX ORDER — ALBUTEROL SULFATE 90 UG/1
4 AEROSOL, METERED RESPIRATORY (INHALATION) PRN
Status: DISCONTINUED | OUTPATIENT
Start: 2024-04-11 | End: 2024-04-12 | Stop reason: HOSPADM

## 2024-04-11 RX ORDER — SODIUM CHLORIDE 9 MG/ML
25 INJECTION, SOLUTION INTRAVENOUS PRN
Status: DISCONTINUED | OUTPATIENT
Start: 2024-04-11 | End: 2024-04-12 | Stop reason: HOSPADM

## 2024-04-11 RX ORDER — ONDANSETRON 2 MG/ML
8 INJECTION INTRAMUSCULAR; INTRAVENOUS
Status: DISCONTINUED | OUTPATIENT
Start: 2024-04-11 | End: 2024-04-12 | Stop reason: HOSPADM

## 2024-04-11 RX ORDER — DIPHENHYDRAMINE HCL 25 MG
25 CAPSULE ORAL ONCE
Status: COMPLETED | OUTPATIENT
Start: 2024-04-11 | End: 2024-04-11

## 2024-04-11 RX ADMIN — DIPHENHYDRAMINE HYDROCHLORIDE 25 MG: 25 CAPSULE ORAL at 10:48

## 2024-04-11 RX ADMIN — SODIUM CHLORIDE, PRESERVATIVE FREE 10 ML: 5 INJECTION INTRAVENOUS at 10:50

## 2024-04-11 RX ADMIN — SODIUM CHLORIDE 25 ML: 9 INJECTION, SOLUTION INTRAVENOUS at 10:51

## 2024-04-11 RX ADMIN — ACETAMINOPHEN 650 MG: 325 TABLET ORAL at 10:48

## 2024-04-11 NOTE — PROGRESS NOTES
Arrived to the Infusion Center.  1 unit of PRBCs completed. Patient tolerated well.   Patient aware of next lab and [facility] office visit on 7/23/2024 (date) at 1030 (time).  Patient instructed to call provider with temperature of 100.4 or greater or nausea/vomiting/ diarrhea or pain not controlled by medications  Discharged in wheelchair.

## 2024-04-12 LAB
ABO + RH BLD: NORMAL
BLD PROD TYP BPU: NORMAL
BLOOD BANK BLOOD PRODUCT EXPIRATION DATE: NORMAL
BLOOD BANK DISPENSE STATUS: NORMAL
BLOOD BANK ISBT PRODUCT BLOOD TYPE: 5100
BLOOD BANK PRODUCT CODE: NORMAL
BLOOD BANK UNIT TYPE AND RH: NORMAL
BLOOD GROUP ANTIBODIES SERPL: NORMAL
BPU ID: NORMAL
CROSSMATCH RESULT: NORMAL
SPECIMEN EXP DATE BLD: NORMAL
UNIT DIVISION: 0
UNIT ISSUE DATE/TIME: NORMAL

## 2024-05-10 ENCOUNTER — TELEPHONE (OUTPATIENT)
Dept: ONCOLOGY | Age: 84
End: 2024-05-10

## 2024-05-10 NOTE — TELEPHONE ENCOUNTER
Labs reviewed by Dr. Abdalla. No need for PRBC. Patient notified. Continue with monthly CBCs with PCP office and follow up as scheduled.

## 2024-07-30 ENCOUNTER — OFFICE VISIT (OUTPATIENT)
Dept: ONCOLOGY | Age: 84
End: 2024-07-30
Payer: MEDICARE

## 2024-07-30 ENCOUNTER — HOSPITAL ENCOUNTER (OUTPATIENT)
Dept: LAB | Age: 84
Discharge: HOME | End: 2024-08-02
Payer: MEDICARE

## 2024-07-30 VITALS
SYSTOLIC BLOOD PRESSURE: 137 MMHG | RESPIRATION RATE: 16 BRPM | OXYGEN SATURATION: 97 % | HEART RATE: 70 BPM | HEIGHT: 60 IN | TEMPERATURE: 98 F | BODY MASS INDEX: 30.68 KG/M2 | DIASTOLIC BLOOD PRESSURE: 71 MMHG

## 2024-07-30 DIAGNOSIS — N18.9 ANEMIA OF RENAL DISEASE: ICD-10-CM

## 2024-07-30 DIAGNOSIS — D63.1 ANEMIA OF RENAL DISEASE: ICD-10-CM

## 2024-07-30 DIAGNOSIS — D46.9 MYELODYSPLASTIC SYNDROME (HCC): ICD-10-CM

## 2024-07-30 DIAGNOSIS — R53.83 FATIGUE, UNSPECIFIED TYPE: ICD-10-CM

## 2024-07-30 DIAGNOSIS — D46.9 MYELODYSPLASTIC SYNDROME (HCC): Primary | ICD-10-CM

## 2024-07-30 LAB
ABO + RH BLD: NORMAL
ALBUMIN SERPL-MCNC: 3.2 G/DL (ref 3.2–4.6)
ALBUMIN/GLOB SERPL: 1 (ref 1–1.9)
ALP SERPL-CCNC: 111 U/L (ref 40–129)
ALT SERPL-CCNC: 34 U/L (ref 12–65)
ANION GAP SERPL CALC-SCNC: 10 MMOL/L (ref 9–18)
AST SERPL-CCNC: 23 U/L (ref 15–37)
BASOPHILS # BLD: 0 K/UL (ref 0–0.2)
BASOPHILS NFR BLD: 0 % (ref 0–2)
BILIRUB SERPL-MCNC: 0.2 MG/DL (ref 0–1.2)
BLOOD GROUP ANTIBODIES SERPL: NORMAL
BUN SERPL-MCNC: 26 MG/DL (ref 8–23)
CALCIUM SERPL-MCNC: 8.9 MG/DL (ref 8.8–10.2)
CHLORIDE SERPL-SCNC: 100 MMOL/L (ref 98–107)
CO2 SERPL-SCNC: 27 MMOL/L (ref 20–28)
CREAT SERPL-MCNC: 0.76 MG/DL (ref 0.8–1.3)
DIFFERENTIAL METHOD BLD: ABNORMAL
EOSINOPHIL # BLD: 0.1 K/UL (ref 0–0.8)
EOSINOPHIL NFR BLD: 1 % (ref 0.5–7.8)
ERYTHROCYTE [DISTWIDTH] IN BLOOD BY AUTOMATED COUNT: 16.9 % (ref 11.9–14.6)
ERYTHROCYTE [SEDIMENTATION RATE] IN BLOOD: 52 MM/HR (ref 0–20)
FERRITIN SERPL-MCNC: 1218 NG/ML (ref 8–388)
GLOBULIN SER CALC-MCNC: 3.3 G/DL (ref 2.3–3.5)
GLUCOSE SERPL-MCNC: 152 MG/DL (ref 70–99)
HCT VFR BLD AUTO: 26.3 % (ref 41.1–50.3)
HGB BLD-MCNC: 8.5 G/DL (ref 13.6–17.2)
IMM GRANULOCYTES # BLD AUTO: 0.1 K/UL (ref 0–0.5)
IMM GRANULOCYTES NFR BLD AUTO: 1 % (ref 0–5)
IRON SATN MFR SERPL: 28 % (ref 20–50)
IRON SERPL-MCNC: 65 UG/DL (ref 35–100)
LYMPHOCYTES # BLD: 0.8 K/UL (ref 0.5–4.6)
LYMPHOCYTES NFR BLD: 7 % (ref 13–44)
MCH RBC QN AUTO: 32.9 PG (ref 26.1–32.9)
MCHC RBC AUTO-ENTMCNC: 32.3 G/DL (ref 31.4–35)
MCV RBC AUTO: 101.9 FL (ref 82–102)
MONOCYTES # BLD: 0.7 K/UL (ref 0.1–1.3)
MONOCYTES NFR BLD: 6 % (ref 4–12)
NEUTS SEG # BLD: 10.2 K/UL (ref 1.7–8.2)
NEUTS SEG NFR BLD: 85 % (ref 43–78)
NRBC # BLD: 0 K/UL (ref 0–0.2)
PLATELET # BLD AUTO: 230 K/UL (ref 150–450)
PMV BLD AUTO: 10 FL (ref 9.4–12.3)
POTASSIUM SERPL-SCNC: 4.3 MMOL/L (ref 3.5–5.1)
PROT SERPL-MCNC: 6.5 G/DL (ref 6.3–8.2)
RBC # BLD AUTO: 2.58 M/UL (ref 4.23–5.6)
SODIUM SERPL-SCNC: 137 MMOL/L (ref 136–145)
SPECIMEN EXP DATE BLD: NORMAL
TIBC SERPL-MCNC: 231 UG/DL (ref 240–450)
UIBC SERPL-MCNC: 166 UG/DL (ref 112–347)
WBC # BLD AUTO: 11.9 K/UL (ref 4.3–11.1)

## 2024-07-30 PROCEDURE — 85025 COMPLETE CBC W/AUTO DIFF WBC: CPT

## 2024-07-30 PROCEDURE — 86901 BLOOD TYPING SEROLOGIC RH(D): CPT

## 2024-07-30 PROCEDURE — 83550 IRON BINDING TEST: CPT

## 2024-07-30 PROCEDURE — 1123F ACP DISCUSS/DSCN MKR DOCD: CPT | Performed by: INTERNAL MEDICINE

## 2024-07-30 PROCEDURE — 85652 RBC SED RATE AUTOMATED: CPT

## 2024-07-30 PROCEDURE — 36415 COLL VENOUS BLD VENIPUNCTURE: CPT

## 2024-07-30 PROCEDURE — 3078F DIAST BP <80 MM HG: CPT | Performed by: INTERNAL MEDICINE

## 2024-07-30 PROCEDURE — 80053 COMPREHEN METABOLIC PANEL: CPT

## 2024-07-30 PROCEDURE — 86850 RBC ANTIBODY SCREEN: CPT

## 2024-07-30 PROCEDURE — G8417 CALC BMI ABV UP PARAM F/U: HCPCS | Performed by: INTERNAL MEDICINE

## 2024-07-30 PROCEDURE — 83540 ASSAY OF IRON: CPT

## 2024-07-30 PROCEDURE — 3075F SYST BP GE 130 - 139MM HG: CPT | Performed by: INTERNAL MEDICINE

## 2024-07-30 PROCEDURE — 86900 BLOOD TYPING SEROLOGIC ABO: CPT

## 2024-07-30 PROCEDURE — 82728 ASSAY OF FERRITIN: CPT

## 2024-07-30 PROCEDURE — 99214 OFFICE O/P EST MOD 30 MIN: CPT | Performed by: INTERNAL MEDICINE

## 2024-07-30 PROCEDURE — 1036F TOBACCO NON-USER: CPT | Performed by: INTERNAL MEDICINE

## 2024-07-30 PROCEDURE — G8427 DOCREV CUR MEDS BY ELIG CLIN: HCPCS | Performed by: INTERNAL MEDICINE

## 2024-07-30 RX ORDER — GABAPENTIN 300 MG/1
CAPSULE ORAL
COMMUNITY
Start: 2024-05-26

## 2024-07-30 RX ORDER — PANTOPRAZOLE SODIUM 40 MG/1
40 TABLET, DELAYED RELEASE ORAL 2 TIMES DAILY
COMMUNITY
Start: 2023-12-18 | End: 2025-01-10

## 2024-07-30 NOTE — PROGRESS NOTES
Data Source: Patient, [facility] record.    7/30/2024    3:24 PM    Andria Hurtado [id number]    83 y.o.      Patient Encounter: [facility] Visit     Heme Diagnosis:   MDS   TREVOR   CKD - on intermittent procrit in past.       Heme History (Copied from prior):    Recurrent mild iron deficiency anemia takes oral iron but now corrected ample iron stores and residual anemia due to renal failure occasional procrit shot see monthly for     Problem List   Date Reviewed: 5/7/2013             Codes  Class  Noted       Anemia of renal disease  285.21    12/20/2010       Overview       Addendum 9/6/2013 10:12 AM by Florencio Young MD       Low epo levels on procrit response     9-06-13 no need for procrit for some time hemoglobin 10 .4 check monthly see 6 monthly                 Iron deficiency  280.9    11/19/2010       Overview       Addendum 9/6/2013 10:11 AM by Florencio Young MD       Supportive Care Flowsheet  11/23/2010 11/23/2010     Day, Cycle  Day 1, Cycle 1       ferumoxytol (FERAHEME) IV         iron dextran complex 50 mg/mL (INFED) IV  [ 25 mg ]  1,000 mg             Supportive Care Flowsheet  5/28/2012 6/4/2012     Day, Cycle  Day 1, Cycle 1  Day 8, Cycle 1     ferumoxytol (FERAHEME) IV  510 mg  510 mg     iron dextran complex 50 mg/mL (INFED) IV            5-2012 repeated     11-07-12 low iron stores regular follow up transferrin saturation  And replacement   9-06-13 iron stores still good  Results for ANDRIA HURTADO () as of 9/6/2013 10:09     9/6/2013      Iron  76     TIBC  262     Transferrin Saturation  29     Ferritin  1203 (H)            Thoracic myelopathy  721.41  Present on Admission  1/29/2013 12/14: BM biopsy w/ RARS low risk MDS . Repeat chromium and cobalt levels pending. No new complaints.    02/15: doing well. Hgb returning to normal .Chromium levels self improving, and he regularly follows w/ ortho for this. Zinc and copper normal. Vit D deficient.

## 2024-07-30 NOTE — PATIENT INSTRUCTIONS
Patient Information from Today's Visit    The members of your [facility] are listed below:    Physician Provider: Bertrand Abdalla Medical Oncologist  Advanced Practice Clinician: Shayla Nguyen NP  Registered Nurse: Norma HILARIO RN  Navigator: N/A  Medical Assistant: Bárbara HILARIO MA  : Steff WALKER   Supportive Care Services: Marisela VELAZQUEZ LMSW    Diagnosis: MDS/hemochromatosis      Follow Up Instructions:   - labs reviewed. Continue to monitor only.       Treatment Summary has been discussed and given to patient:No      Current Labs:   Hospital Outpatient Visit on 07/30/2024   Component Date Value Ref Range Status    WBC 07/30/2024 11.9 (H)  4.3 - 11.1 K/uL Final    RBC 07/30/2024 2.58 (L)  4.23 - 5.6 M/uL Final    Hemoglobin 07/30/2024 8.5 (L)  13.6 - 17.2 g/dL Final    Hematocrit 07/30/2024 26.3 (L)  41.1 - 50.3 % Final    MCV 07/30/2024 101.9  82.0 - 102.0 FL Final    MCH 07/30/2024 32.9  26.1 - 32.9 PG Final    MCHC 07/30/2024 32.3  31.4 - 35.0 g/dL Final    RDW 07/30/2024 16.9 (H)  11.9 - 14.6 % Final    Platelets 07/30/2024 230  150 - 450 K/uL Final    MPV 07/30/2024 10.0  9.4 - 12.3 FL Final    nRBC 07/30/2024 0.00  0.0 - 0.2 K/uL Final    **Note: Absolute NRBC parameter is now reported with Hemogram**    Neutrophils % 07/30/2024 85 (H)  43 - 78 % Final    Lymphocytes % 07/30/2024 7 (L)  13 - 44 % Final    Monocytes % 07/30/2024 6  4.0 - 12.0 % Final    Eosinophils % 07/30/2024 1  0.5 - 7.8 % Final    Basophils % 07/30/2024 0  0.0 - 2.0 % Final    Immature Granulocytes % 07/30/2024 1  0.0 - 5.0 % Final    Neutrophils Absolute 07/30/2024 10.2 (H)  1.7 - 8.2 K/UL Final    Lymphocytes Absolute 07/30/2024 0.8  0.5 - 4.6 K/UL Final    Monocytes Absolute 07/30/2024 0.7  0.1 - 1.3 K/UL Final    Eosinophils Absolute 07/30/2024 0.1  0.0 - 0.8 K/UL Final    Basophils Absolute 07/30/2024 0.0  0.0 - 0.2 K/UL Final    Immature Granulocytes Absolute 07/30/2024 0.1  0.0 - 0.5 K/UL Final    Differential Type

## 2024-11-19 ENCOUNTER — OFFICE VISIT (OUTPATIENT)
Dept: ONCOLOGY | Age: 84
End: 2024-11-19
Payer: MEDICARE

## 2024-11-19 ENCOUNTER — HOSPITAL ENCOUNTER (OUTPATIENT)
Dept: LAB | Age: 84
Discharge: HOME | End: 2024-11-19
Payer: MEDICARE

## 2024-11-19 VITALS
OXYGEN SATURATION: 97 % | TEMPERATURE: 97.4 F | HEART RATE: 73 BPM | SYSTOLIC BLOOD PRESSURE: 159 MMHG | RESPIRATION RATE: 17 BRPM | DIASTOLIC BLOOD PRESSURE: 84 MMHG | WEIGHT: 128.25 LBS | BODY MASS INDEX: 25.18 KG/M2 | HEIGHT: 60 IN

## 2024-11-19 DIAGNOSIS — E83.19 IRON OVERLOAD: ICD-10-CM

## 2024-11-19 DIAGNOSIS — D63.1 ANEMIA OF RENAL DISEASE: ICD-10-CM

## 2024-11-19 DIAGNOSIS — D46.9 MYELODYSPLASTIC SYNDROME (HCC): Primary | ICD-10-CM

## 2024-11-19 DIAGNOSIS — N18.9 ANEMIA OF RENAL DISEASE: ICD-10-CM

## 2024-11-19 DIAGNOSIS — D46.9 MYELODYSPLASTIC SYNDROME (HCC): ICD-10-CM

## 2024-11-19 LAB
ALBUMIN SERPL-MCNC: 3.3 G/DL (ref 3.2–4.6)
ALBUMIN/GLOB SERPL: 0.9 (ref 1–1.9)
ALP SERPL-CCNC: 112 U/L (ref 40–129)
ALT SERPL-CCNC: 8 U/L (ref 8–55)
ANION GAP SERPL CALC-SCNC: 10 MMOL/L (ref 7–16)
AST SERPL-CCNC: 18 U/L (ref 15–37)
BASOPHILS # BLD: 0 K/UL (ref 0–0.2)
BASOPHILS NFR BLD: 0 % (ref 0–2)
BILIRUB SERPL-MCNC: 0.3 MG/DL (ref 0–1.2)
BUN SERPL-MCNC: 23 MG/DL (ref 8–23)
CALCIUM SERPL-MCNC: 9.1 MG/DL (ref 8.8–10.2)
CHLORIDE SERPL-SCNC: 102 MMOL/L (ref 98–107)
CO2 SERPL-SCNC: 28 MMOL/L (ref 20–29)
CREAT SERPL-MCNC: 0.96 MG/DL (ref 0.8–1.3)
DIFFERENTIAL METHOD BLD: ABNORMAL
EOSINOPHIL # BLD: 0.1 K/UL (ref 0–0.8)
EOSINOPHIL NFR BLD: 1 % (ref 0.5–7.8)
ERYTHROCYTE [DISTWIDTH] IN BLOOD BY AUTOMATED COUNT: 17.5 % (ref 11.9–14.6)
ERYTHROCYTE [SEDIMENTATION RATE] IN BLOOD: 30 MM/HR (ref 0–20)
FERRITIN SERPL-MCNC: 936 NG/ML (ref 8–388)
GLOBULIN SER CALC-MCNC: 3.6 G/DL (ref 2.3–3.5)
GLUCOSE SERPL-MCNC: 126 MG/DL (ref 70–99)
HCT VFR BLD AUTO: 27 % (ref 41.1–50.3)
HGB BLD-MCNC: 8.6 G/DL (ref 13.6–17.2)
IMM GRANULOCYTES # BLD AUTO: 0 K/UL (ref 0–0.5)
IMM GRANULOCYTES NFR BLD AUTO: 0 % (ref 0–5)
IRON SATN MFR SERPL: 31 % (ref 20–50)
IRON SERPL-MCNC: 71 UG/DL (ref 35–100)
LYMPHOCYTES # BLD: 1 K/UL (ref 0.5–4.6)
LYMPHOCYTES NFR BLD: 14 % (ref 13–44)
MCH RBC QN AUTO: 32.8 PG (ref 26.1–32.9)
MCHC RBC AUTO-ENTMCNC: 31.9 G/DL (ref 31.4–35)
MCV RBC AUTO: 103.1 FL (ref 82–102)
MONOCYTES # BLD: 0.5 K/UL (ref 0.1–1.3)
MONOCYTES NFR BLD: 7 % (ref 4–12)
NEUTS SEG # BLD: 5.8 K/UL (ref 1.7–8.2)
NEUTS SEG NFR BLD: 78 % (ref 43–78)
NRBC # BLD: 0 K/UL (ref 0–0.2)
PLATELET # BLD AUTO: 172 K/UL (ref 150–450)
PMV BLD AUTO: 9.7 FL (ref 9.4–12.3)
POTASSIUM SERPL-SCNC: 3.8 MMOL/L (ref 3.5–5.1)
PROT SERPL-MCNC: 7 G/DL (ref 6.3–8.2)
RBC # BLD AUTO: 2.62 M/UL (ref 4.23–5.6)
SODIUM SERPL-SCNC: 140 MMOL/L (ref 136–145)
TIBC SERPL-MCNC: 229 UG/DL (ref 240–450)
UIBC SERPL-MCNC: 158 UG/DL (ref 112–347)
WBC # BLD AUTO: 7.4 K/UL (ref 4.3–11.1)

## 2024-11-19 PROCEDURE — G8417 CALC BMI ABV UP PARAM F/U: HCPCS | Performed by: INTERNAL MEDICINE

## 2024-11-19 PROCEDURE — 83540 ASSAY OF IRON: CPT

## 2024-11-19 PROCEDURE — 3078F DIAST BP <80 MM HG: CPT | Performed by: INTERNAL MEDICINE

## 2024-11-19 PROCEDURE — 83550 IRON BINDING TEST: CPT

## 2024-11-19 PROCEDURE — 1123F ACP DISCUSS/DSCN MKR DOCD: CPT | Performed by: INTERNAL MEDICINE

## 2024-11-19 PROCEDURE — 85652 RBC SED RATE AUTOMATED: CPT

## 2024-11-19 PROCEDURE — 1160F RVW MEDS BY RX/DR IN RCRD: CPT | Performed by: INTERNAL MEDICINE

## 2024-11-19 PROCEDURE — 80053 COMPREHEN METABOLIC PANEL: CPT

## 2024-11-19 PROCEDURE — G8484 FLU IMMUNIZE NO ADMIN: HCPCS | Performed by: INTERNAL MEDICINE

## 2024-11-19 PROCEDURE — 1126F AMNT PAIN NOTED NONE PRSNT: CPT | Performed by: INTERNAL MEDICINE

## 2024-11-19 PROCEDURE — 82728 ASSAY OF FERRITIN: CPT

## 2024-11-19 PROCEDURE — 85025 COMPLETE CBC W/AUTO DIFF WBC: CPT

## 2024-11-19 PROCEDURE — G8427 DOCREV CUR MEDS BY ELIG CLIN: HCPCS | Performed by: INTERNAL MEDICINE

## 2024-11-19 PROCEDURE — 1159F MED LIST DOCD IN RCRD: CPT | Performed by: INTERNAL MEDICINE

## 2024-11-19 PROCEDURE — 3074F SYST BP LT 130 MM HG: CPT | Performed by: INTERNAL MEDICINE

## 2024-11-19 PROCEDURE — 99214 OFFICE O/P EST MOD 30 MIN: CPT | Performed by: INTERNAL MEDICINE

## 2024-11-19 PROCEDURE — 36415 COLL VENOUS BLD VENIPUNCTURE: CPT

## 2024-11-19 PROCEDURE — 1036F TOBACCO NON-USER: CPT | Performed by: INTERNAL MEDICINE

## 2024-11-19 ASSESSMENT — PATIENT HEALTH QUESTIONNAIRE - PHQ9
SUM OF ALL RESPONSES TO PHQ9 QUESTIONS 1 & 2: 0
1. LITTLE INTEREST OR PLEASURE IN DOING THINGS: NOT AT ALL
SUM OF ALL RESPONSES TO PHQ QUESTIONS 1-9: 0
SUM OF ALL RESPONSES TO PHQ QUESTIONS 1-9: 0
2. FEELING DOWN, DEPRESSED OR HOPELESS: NOT AT ALL
SUM OF ALL RESPONSES TO PHQ QUESTIONS 1-9: 0
SUM OF ALL RESPONSES TO PHQ QUESTIONS 1-9: 0

## 2024-11-19 NOTE — PATIENT INSTRUCTIONS
Patient Information from Today's Visit    The members of your [facility] are listed below:    Physician Provider: Bertrand Abdalla Medical Oncologist  Advanced Practice Clinician: Shayla Nguyen NP  Registered Nurse: Norma HILARIO RN  Navigator: N/A  Medical Assistant: Bárbara HILARIO MA  : Steff WALKER   Supportive Care Services: Dayna STANTON LMSW    Diagnosis: MDS      Follow Up Instructions:   - labs reviewed  - stop iron pill   - please get labs with Dr. Melvin in 2 months. We will call you if anything is worrisome.  - follow up in 4 months      Treatment Summary has been discussed and given to patient:N/A      Current Labs:   Hospital Outpatient Visit on 11/19/2024   Component Date Value Ref Range Status    Sed Rate, Automated 11/19/2024 30 (H)  0 - 20 mm/hr Final    Iron 11/19/2024 71  35 - 100 ug/dL Final    TIBC 11/19/2024 229 (L)  240 - 450 ug/dL Final    Iron % Saturation 11/19/2024 31  20 - 50 % Final    UIBC 11/19/2024 158.0  112.0 - 347.0 ug/dL Final    Ferritin 11/19/2024 936 (H)  8 - 388 NG/ML Final    Sodium 11/19/2024 140  136 - 145 mmol/L Final    Potassium 11/19/2024 3.8  3.5 - 5.1 mmol/L Final    Chloride 11/19/2024 102  98 - 107 mmol/L Final    CO2 11/19/2024 28  20 - 29 mmol/L Final    Anion Gap 11/19/2024 10  7 - 16 mmol/L Final    Glucose 11/19/2024 126 (H)  70 - 99 mg/dL Final    Comment: <70 mg/dL Consistent with, but not fully diagnostic of hypoglycemia.  100 - 125 mg/dL Impaired fasting glucose/consistent with pre-diabetes mellitus.  > 126 mg/dl Fasting glucose consistent with overt diabetes mellitus      BUN 11/19/2024 23  8 - 23 MG/DL Final    Creatinine 11/19/2024 0.96  0.80 - 1.30 MG/DL Final    Est, Glom Filt Rate 11/19/2024 78  >60 ml/min/1.73m2 Final    Comment:    Pediatric calculator link: https://www.kidney.org/professionals/kdoqi/gfr_calculatorped     These results are not intended for use in patients <18 years of age.     eGFR results are calculated without a race factor

## 2024-11-19 NOTE — PROGRESS NOTES
Data Source: Patient, [facility] record.    11/19/2024    2:08 PM    Andria Hurtado [id number]    84 y.o.      Patient Encounter: [facility] Visit     Heme Diagnosis:   MDS   TREVOR   CKD - on intermittent procrit in past.       Heme History (Copied from prior):    Recurrent mild iron deficiency anemia takes oral iron but now corrected ample iron stores and residual anemia due to renal failure occasional procrit shot see monthly for     Problem List   Date Reviewed: 5/7/2013             Codes  Class  Noted       Anemia of renal disease  285.21    12/20/2010       Overview       Addendum 9/6/2013 10:12 AM by Florencio Young MD       Low epo levels on procrit response     9-06-13 no need for procrit for some time hemoglobin 10 .4 check monthly see 6 monthly                 Iron deficiency  280.9    11/19/2010       Overview       Addendum 9/6/2013 10:11 AM by Florencio Young MD       Supportive Care Flowsheet  11/23/2010 11/23/2010     Day, Cycle  Day 1, Cycle 1       ferumoxytol (FERAHEME) IV         iron dextran complex 50 mg/mL (INFED) IV  [ 25 mg ]  1,000 mg             Supportive Care Flowsheet  5/28/2012 6/4/2012     Day, Cycle  Day 1, Cycle 1  Day 8, Cycle 1     ferumoxytol (FERAHEME) IV  510 mg  510 mg     iron dextran complex 50 mg/mL (INFED) IV            5-2012 repeated     11-07-12 low iron stores regular follow up transferrin saturation  And replacement   9-06-13 iron stores still good  Results for ANDRIA HURTADO () as of 9/6/2013 10:09     9/6/2013      Iron  76     TIBC  262     Transferrin Saturation  29     Ferritin  1203 (H)            Thoracic myelopathy  721.41  Present on Admission  1/29/2013 12/14: BM biopsy w/ RARS low risk MDS . Repeat chromium and cobalt levels pending. No new complaints.    02/15: doing well. Hgb returning to normal .Chromium levels self improving, and he regularly follows w/ ortho for this. Zinc and copper normal. Vit D deficient.

## 2025-03-25 ENCOUNTER — HOSPITAL ENCOUNTER (OUTPATIENT)
Dept: LAB | Age: 85
Discharge: HOME | End: 2025-03-25
Payer: MEDICARE

## 2025-03-25 ENCOUNTER — OFFICE VISIT (OUTPATIENT)
Dept: ONCOLOGY | Age: 85
End: 2025-03-25
Payer: MEDICARE

## 2025-03-25 VITALS
TEMPERATURE: 97.7 F | SYSTOLIC BLOOD PRESSURE: 111 MMHG | OXYGEN SATURATION: 98 % | BODY MASS INDEX: 25.9 KG/M2 | DIASTOLIC BLOOD PRESSURE: 65 MMHG | RESPIRATION RATE: 16 BRPM | HEART RATE: 70 BPM | HEIGHT: 60 IN

## 2025-03-25 DIAGNOSIS — D46.9 MYELODYSPLASTIC SYNDROME (HCC): Primary | ICD-10-CM

## 2025-03-25 DIAGNOSIS — E83.19 IRON OVERLOAD: ICD-10-CM

## 2025-03-25 DIAGNOSIS — D46.9 MYELODYSPLASTIC SYNDROME (HCC): ICD-10-CM

## 2025-03-25 LAB
ALBUMIN SERPL-MCNC: 3.6 G/DL (ref 3.2–4.6)
ALBUMIN/GLOB SERPL: 1.1 (ref 1–1.9)
ALP SERPL-CCNC: 99 U/L (ref 40–129)
ALT SERPL-CCNC: 12 U/L (ref 8–55)
ANION GAP SERPL CALC-SCNC: 12 MMOL/L (ref 7–16)
AST SERPL-CCNC: 24 U/L (ref 15–37)
BASOPHILS # BLD: 0 K/UL (ref 0–0.2)
BASOPHILS NFR BLD: 0 % (ref 0–2)
BILIRUB SERPL-MCNC: 0.4 MG/DL (ref 0–1.2)
BUN SERPL-MCNC: 27 MG/DL (ref 8–23)
CALCIUM SERPL-MCNC: 9 MG/DL (ref 8.8–10.2)
CHLORIDE SERPL-SCNC: 101 MMOL/L (ref 98–107)
CO2 SERPL-SCNC: 25 MMOL/L (ref 20–29)
CREAT SERPL-MCNC: 0.96 MG/DL (ref 0.8–1.3)
DIFFERENTIAL METHOD BLD: ABNORMAL
EOSINOPHIL # BLD: 0.09 K/UL (ref 0–0.8)
EOSINOPHIL NFR BLD: 1.4 % (ref 0.5–7.8)
ERYTHROCYTE [DISTWIDTH] IN BLOOD BY AUTOMATED COUNT: 17.7 % (ref 11.9–14.6)
ERYTHROCYTE [SEDIMENTATION RATE] IN BLOOD: 23 MM/HR (ref 0–20)
FERRITIN SERPL-MCNC: 759 NG/ML (ref 8–388)
GLOBULIN SER CALC-MCNC: 3.4 G/DL (ref 2.3–3.5)
GLUCOSE SERPL-MCNC: 126 MG/DL (ref 70–99)
HCT VFR BLD AUTO: 28.7 % (ref 41.1–50.3)
HGB BLD-MCNC: 9.4 G/DL (ref 13.6–17.2)
IMM GRANULOCYTES # BLD AUTO: 0.02 K/UL (ref 0–0.5)
IMM GRANULOCYTES NFR BLD AUTO: 0.3 % (ref 0–5)
IRON SATN MFR SERPL: 29 % (ref 20–50)
IRON SERPL-MCNC: 76 UG/DL (ref 35–100)
LYMPHOCYTES # BLD: 0.87 K/UL (ref 0.5–4.6)
LYMPHOCYTES NFR BLD: 13.3 % (ref 13–44)
MCH RBC QN AUTO: 32.3 PG (ref 26.1–32.9)
MCHC RBC AUTO-ENTMCNC: 32.8 G/DL (ref 31.4–35)
MCV RBC AUTO: 98.6 FL (ref 82–102)
MONOCYTES # BLD: 0.33 K/UL (ref 0.1–1.3)
MONOCYTES NFR BLD: 5 % (ref 4–12)
NEUTS SEG # BLD: 5.24 K/UL (ref 1.7–8.2)
NEUTS SEG NFR BLD: 80 % (ref 43–78)
NRBC # BLD: 0 K/UL (ref 0–0.2)
PLATELET # BLD AUTO: 149 K/UL (ref 150–450)
PMV BLD AUTO: 10 FL (ref 9.4–12.3)
POTASSIUM SERPL-SCNC: 3.8 MMOL/L (ref 3.5–5.1)
PROT SERPL-MCNC: 7 G/DL (ref 6.3–8.2)
RBC # BLD AUTO: 2.91 M/UL (ref 4.23–5.6)
SODIUM SERPL-SCNC: 138 MMOL/L (ref 136–145)
TIBC SERPL-MCNC: 258 UG/DL (ref 240–450)
UIBC SERPL-MCNC: 182 UG/DL (ref 112–347)
WBC # BLD AUTO: 6.6 K/UL (ref 4.3–11.1)

## 2025-03-25 PROCEDURE — 1036F TOBACCO NON-USER: CPT | Performed by: NURSE PRACTITIONER

## 2025-03-25 PROCEDURE — 83550 IRON BINDING TEST: CPT

## 2025-03-25 PROCEDURE — 3078F DIAST BP <80 MM HG: CPT | Performed by: NURSE PRACTITIONER

## 2025-03-25 PROCEDURE — 85652 RBC SED RATE AUTOMATED: CPT

## 2025-03-25 PROCEDURE — 99214 OFFICE O/P EST MOD 30 MIN: CPT | Performed by: NURSE PRACTITIONER

## 2025-03-25 PROCEDURE — 85025 COMPLETE CBC W/AUTO DIFF WBC: CPT

## 2025-03-25 PROCEDURE — 3074F SYST BP LT 130 MM HG: CPT | Performed by: NURSE PRACTITIONER

## 2025-03-25 PROCEDURE — 1126F AMNT PAIN NOTED NONE PRSNT: CPT | Performed by: NURSE PRACTITIONER

## 2025-03-25 PROCEDURE — G8427 DOCREV CUR MEDS BY ELIG CLIN: HCPCS | Performed by: NURSE PRACTITIONER

## 2025-03-25 PROCEDURE — 1123F ACP DISCUSS/DSCN MKR DOCD: CPT | Performed by: NURSE PRACTITIONER

## 2025-03-25 PROCEDURE — 82728 ASSAY OF FERRITIN: CPT

## 2025-03-25 PROCEDURE — G8417 CALC BMI ABV UP PARAM F/U: HCPCS | Performed by: NURSE PRACTITIONER

## 2025-03-25 PROCEDURE — 83540 ASSAY OF IRON: CPT

## 2025-03-25 PROCEDURE — 1160F RVW MEDS BY RX/DR IN RCRD: CPT | Performed by: NURSE PRACTITIONER

## 2025-03-25 PROCEDURE — 36415 COLL VENOUS BLD VENIPUNCTURE: CPT

## 2025-03-25 PROCEDURE — 1159F MED LIST DOCD IN RCRD: CPT | Performed by: NURSE PRACTITIONER

## 2025-03-25 PROCEDURE — 80053 COMPREHEN METABOLIC PANEL: CPT

## 2025-03-25 RX ORDER — PANTOPRAZOLE SODIUM 40 MG/1
40 TABLET, DELAYED RELEASE ORAL 2 TIMES DAILY
COMMUNITY
Start: 2025-01-02 | End: 2025-07-01

## 2025-03-25 ASSESSMENT — PATIENT HEALTH QUESTIONNAIRE - PHQ9
SUM OF ALL RESPONSES TO PHQ QUESTIONS 1-9: 0
SUM OF ALL RESPONSES TO PHQ QUESTIONS 1-9: 0
1. LITTLE INTEREST OR PLEASURE IN DOING THINGS: NOT AT ALL
SUM OF ALL RESPONSES TO PHQ QUESTIONS 1-9: 0
2. FEELING DOWN, DEPRESSED OR HOPELESS: NOT AT ALL
SUM OF ALL RESPONSES TO PHQ QUESTIONS 1-9: 0

## 2025-03-25 NOTE — PROGRESS NOTES
Data Source: Patient, [facility] record.    3/25/2025    2:45 PM    Andria Hurtado [id number]    84 y.o.      Patient Encounter: [facility] Visit     Heme Diagnosis:   MDS   TREVOR   CKD - on intermittent procrit in past.       Heme History (Copied from prior):    Recurrent mild iron deficiency anemia takes oral iron but now corrected ample iron stores and residual anemia due to renal failure occasional procrit shot see monthly for     Problem List   Date Reviewed: 5/7/2013             Codes  Class  Noted       Anemia of renal disease  285.21    12/20/2010       Overview       Addendum 9/6/2013 10:12 AM by Florencio Young MD       Low epo levels on procrit response     9-06-13 no need for procrit for some time hemoglobin 10 .4 check monthly see 6 monthly                 Iron deficiency  280.9    11/19/2010       Overview       Addendum 9/6/2013 10:11 AM by Florencio Young MD       Supportive Care Flowsheet  11/23/2010 11/23/2010     Day, Cycle  Day 1, Cycle 1       ferumoxytol (FERAHEME) IV         iron dextran complex 50 mg/mL (INFED) IV  [ 25 mg ]  1,000 mg             Supportive Care Flowsheet  5/28/2012 6/4/2012     Day, Cycle  Day 1, Cycle 1  Day 8, Cycle 1     ferumoxytol (FERAHEME) IV  510 mg  510 mg     iron dextran complex 50 mg/mL (INFED) IV            5-2012 repeated     11-07-12 low iron stores regular follow up transferrin saturation  And replacement   9-06-13 iron stores still good  Results for ANDRIA HURTADO () as of 9/6/2013 10:09     9/6/2013      Iron  76     TIBC  262     Transferrin Saturation  29     Ferritin  1203 (H)            Thoracic myelopathy  721.41  Present on Admission  1/29/2013 12/14: BM biopsy w/ RARS low risk MDS . Repeat chromium and cobalt levels pending. No new complaints.    02/15: doing well. Hgb returning to normal .Chromium levels self improving, and he regularly follows w/ ortho for this. Zinc and copper normal. Vit D deficient.

## 2025-06-24 NOTE — PROGRESS NOTES
Name: Aneudy Hurtado  YOB: 1940  Gender: male  MRN: [id number]    CC: Bilateral ankle/foot concerns    HPI:   2023: He reports significant medical issues requiring extensive bedrest  2023: After his significant medical issues, he reports losing the ability to walk  2918-8309: Gradual resumption of walking with a rolling walker but his ability to walk is very limited  2025: With resumption of walking, he noticed a change in both ankle and foot positions    06/25/2025: Initial visit: Bilateral ankle/foot concerns    ROS/Meds/PSH/PMH/FH/SH: Only reviewed pertinent/relevant information      Tobacco:  reports that he quit smoking about 58 years ago. His smoking use included cigarettes. He started smoking about 63 years ago. He has a 5 pack-year smoking history. He has never used smokeless tobacco.     Reviewed Test/Records/Documents:   1994: Right pan-talar fusion  2011: Left triple arthrodesis    07/23/2020: Dx: Left plantarlateral foot overload, triple arthrodesis, ankle arthritis, midfoot arthritis   Prior multiple spinal procedures and fusions and Left VEE     04/26/2022: He presented to assess his left foot - walks at home without any shoes  07/25/2022: Diagnoses:  Left solid triple arthrodesis; midfoot arthritis; ankle arthritis; lateral foot overload  Right pantalar fusion; plantar lateral foot thickening      prosthetics: Custom full-length insoles: Lateral foot/lateral ray PQ/silicone offloading Right 5th metatarsal tuberosity pressure pain    04/03/2025: [place] wound/hyperbaric: Right buttock/presacral ulceration since June 2024: Healed    05/23/2025: [facility] PT: Myelodysplastic syndrome: Decreased independence: Decrease motion: Muscle weakness: Impaired mobilities and ADLs: Therapist reports patient is concerned about feeling his ankles turning and possible Charcot joint: Discussed bracing or inserts     Physical Examination:  Patient appears

## 2025-06-25 ENCOUNTER — OFFICE VISIT (OUTPATIENT)
Dept: ORTHOPEDIC SURGERY | Age: 85
End: 2025-06-25
Payer: MEDICARE

## 2025-06-25 DIAGNOSIS — M79.671 RIGHT FOOT PAIN: ICD-10-CM

## 2025-06-25 DIAGNOSIS — M19.172 POST-TRAUMATIC OSTEOARTHRITIS OF LEFT FOOT: Primary | ICD-10-CM

## 2025-06-25 DIAGNOSIS — M19.172 POST-TRAUMATIC OSTEOARTHRITIS OF LEFT ANKLE: ICD-10-CM

## 2025-06-25 PROCEDURE — 1036F TOBACCO NON-USER: CPT | Performed by: ORTHOPAEDIC SURGERY

## 2025-06-25 PROCEDURE — 99203 OFFICE O/P NEW LOW 30 MIN: CPT | Performed by: ORTHOPAEDIC SURGERY

## 2025-06-25 PROCEDURE — G8428 CUR MEDS NOT DOCUMENT: HCPCS | Performed by: ORTHOPAEDIC SURGERY

## 2025-06-25 PROCEDURE — 1123F ACP DISCUSS/DSCN MKR DOCD: CPT | Performed by: ORTHOPAEDIC SURGERY

## 2025-06-25 PROCEDURE — G8417 CALC BMI ABV UP PARAM F/U: HCPCS | Performed by: ORTHOPAEDIC SURGERY

## 2025-08-01 ENCOUNTER — TELEPHONE (OUTPATIENT)
Dept: ORTHOPEDIC SURGERY | Age: 85
End: 2025-08-01

## 2025-08-01 NOTE — TELEPHONE ENCOUNTER
----- Message from Norah S sent at 8/1/2025  8:54 AM EDT -----  Specialty Message to Provider To Dr. Enciso    Relationship to Patient: Self     Patient Message: Pt. Called for, Need clarification on type of boot he prescribe. Please contact pt. For this  --------------------------------------------------------------------------------------------------------------------------    Call Back Information: OK to leave message on voicemail  Preferred Call Back Number: Phone [phone]

## 2025-08-01 NOTE — TELEPHONE ENCOUNTER
Called and spoke to pt . Pt was prescribe a custom brace which he has to have made with [employer]. Pt was prescribe a Bilateral custom clamshell braces and Bilateral custom offloading PQ insoles. I will call pt back on Monday once I clarified this with . Pt voiced understood.

## 2025-08-05 ENCOUNTER — HOSPITAL ENCOUNTER (OUTPATIENT)
Dept: LAB | Age: 85
Discharge: HOME | End: 2025-08-05
Payer: MEDICARE

## 2025-08-05 ENCOUNTER — OFFICE VISIT (OUTPATIENT)
Dept: ONCOLOGY | Age: 85
End: 2025-08-05
Payer: MEDICARE

## 2025-08-05 VITALS
RESPIRATION RATE: 16 BRPM | TEMPERATURE: 97.6 F | HEIGHT: 60 IN | OXYGEN SATURATION: 96 % | DIASTOLIC BLOOD PRESSURE: 92 MMHG | WEIGHT: 167 LBS | BODY MASS INDEX: 32.79 KG/M2 | SYSTOLIC BLOOD PRESSURE: 155 MMHG | HEART RATE: 73 BPM

## 2025-08-05 DIAGNOSIS — E83.19 IRON OVERLOAD: ICD-10-CM

## 2025-08-05 DIAGNOSIS — D46.9 MYELODYSPLASTIC SYNDROME (HCC): Primary | ICD-10-CM

## 2025-08-05 DIAGNOSIS — D46.9 MYELODYSPLASTIC SYNDROME (HCC): ICD-10-CM

## 2025-08-05 DIAGNOSIS — R53.83 FATIGUE, UNSPECIFIED TYPE: ICD-10-CM

## 2025-08-05 LAB
ALBUMIN SERPL-MCNC: 3.7 G/DL (ref 3.2–4.6)
ALBUMIN/GLOB SERPL: 1.1 (ref 1–1.9)
ALP SERPL-CCNC: 87 U/L (ref 40–129)
ALT SERPL-CCNC: 15 U/L (ref 8–55)
ANION GAP SERPL CALC-SCNC: 13 MMOL/L (ref 7–16)
AST SERPL-CCNC: 28 U/L (ref 15–37)
BASOPHILS # BLD: 0.01 K/UL (ref 0–0.2)
BASOPHILS NFR BLD: 0.1 % (ref 0–2)
BILIRUB SERPL-MCNC: 0.4 MG/DL (ref 0–1.2)
BUN SERPL-MCNC: 22 MG/DL (ref 8–23)
CALCIUM SERPL-MCNC: 9.5 MG/DL (ref 8.8–10.2)
CHLORIDE SERPL-SCNC: 99 MMOL/L (ref 98–107)
CO2 SERPL-SCNC: 25 MMOL/L (ref 20–29)
CREAT SERPL-MCNC: 1.05 MG/DL (ref 0.8–1.3)
DIFFERENTIAL METHOD BLD: ABNORMAL
EOSINOPHIL # BLD: 0.07 K/UL (ref 0–0.8)
EOSINOPHIL NFR BLD: 1 % (ref 0.5–7.8)
ERYTHROCYTE [DISTWIDTH] IN BLOOD BY AUTOMATED COUNT: 17.9 % (ref 11.9–14.6)
ERYTHROCYTE [SEDIMENTATION RATE] IN BLOOD: 39 MM/HR (ref 0–20)
FERRITIN SERPL-MCNC: 702 NG/ML (ref 8–388)
GLOBULIN SER CALC-MCNC: 3.3 G/DL (ref 2.3–3.5)
GLUCOSE SERPL-MCNC: 103 MG/DL (ref 70–99)
HCT VFR BLD AUTO: 28.1 % (ref 41.1–50.3)
HGB BLD-MCNC: 9.4 G/DL (ref 13.6–17.2)
IMM GRANULOCYTES # BLD AUTO: 0.03 K/UL (ref 0–0.5)
IMM GRANULOCYTES NFR BLD AUTO: 0.4 % (ref 0–5)
IRON SATN MFR SERPL: 35 % (ref 20–50)
IRON SERPL-MCNC: 86 UG/DL (ref 35–100)
LYMPHOCYTES # BLD: 1.09 K/UL (ref 0.5–4.6)
LYMPHOCYTES NFR BLD: 15.8 % (ref 13–44)
MCH RBC QN AUTO: 32.9 PG (ref 26.1–32.9)
MCHC RBC AUTO-ENTMCNC: 33.5 G/DL (ref 31.4–35)
MCV RBC AUTO: 98.3 FL (ref 82–102)
MONOCYTES # BLD: 0.41 K/UL (ref 0.1–1.3)
MONOCYTES NFR BLD: 6 % (ref 4–12)
NEUTS SEG # BLD: 5.27 K/UL (ref 1.7–8.2)
NEUTS SEG NFR BLD: 76.7 % (ref 43–78)
NRBC # BLD: 0 K/UL (ref 0–0.2)
PLATELET # BLD AUTO: 181 K/UL (ref 150–450)
PMV BLD AUTO: 10.2 FL (ref 9.4–12.3)
POTASSIUM SERPL-SCNC: 4 MMOL/L (ref 3.5–5.1)
PROT SERPL-MCNC: 7 G/DL (ref 6.3–8.2)
RBC # BLD AUTO: 2.86 M/UL (ref 4.23–5.6)
SODIUM SERPL-SCNC: 137 MMOL/L (ref 136–145)
TIBC SERPL-MCNC: 249 UG/DL (ref 240–450)
UIBC SERPL-MCNC: 163 UG/DL (ref 112–347)
WBC # BLD AUTO: 6.9 K/UL (ref 4.3–11.1)

## 2025-08-05 PROCEDURE — 1036F TOBACCO NON-USER: CPT | Performed by: INTERNAL MEDICINE

## 2025-08-05 PROCEDURE — 3077F SYST BP >= 140 MM HG: CPT | Performed by: INTERNAL MEDICINE

## 2025-08-05 PROCEDURE — 99214 OFFICE O/P EST MOD 30 MIN: CPT | Performed by: INTERNAL MEDICINE

## 2025-08-05 PROCEDURE — 3080F DIAST BP >= 90 MM HG: CPT | Performed by: INTERNAL MEDICINE

## 2025-08-05 PROCEDURE — 85025 COMPLETE CBC W/AUTO DIFF WBC: CPT

## 2025-08-05 PROCEDURE — 1159F MED LIST DOCD IN RCRD: CPT | Performed by: INTERNAL MEDICINE

## 2025-08-05 PROCEDURE — 83540 ASSAY OF IRON: CPT

## 2025-08-05 PROCEDURE — 82728 ASSAY OF FERRITIN: CPT

## 2025-08-05 PROCEDURE — G8427 DOCREV CUR MEDS BY ELIG CLIN: HCPCS | Performed by: INTERNAL MEDICINE

## 2025-08-05 PROCEDURE — 1126F AMNT PAIN NOTED NONE PRSNT: CPT | Performed by: INTERNAL MEDICINE

## 2025-08-05 PROCEDURE — 80053 COMPREHEN METABOLIC PANEL: CPT

## 2025-08-05 PROCEDURE — 85652 RBC SED RATE AUTOMATED: CPT

## 2025-08-05 PROCEDURE — 1123F ACP DISCUSS/DSCN MKR DOCD: CPT | Performed by: INTERNAL MEDICINE

## 2025-08-05 PROCEDURE — 36415 COLL VENOUS BLD VENIPUNCTURE: CPT

## 2025-08-05 PROCEDURE — G8417 CALC BMI ABV UP PARAM F/U: HCPCS | Performed by: INTERNAL MEDICINE

## 2025-08-05 PROCEDURE — 83550 IRON BINDING TEST: CPT

## 2025-08-06 ENCOUNTER — PATIENT MESSAGE (OUTPATIENT)
Dept: ORTHOPEDIC SURGERY | Age: 85
End: 2025-08-06

## 2025-08-06 ENCOUNTER — TELEPHONE (OUTPATIENT)
Dept: ORTHOPEDIC SURGERY | Age: 85
End: 2025-08-06

## 2025-08-08 ENCOUNTER — TELEPHONE (OUTPATIENT)
Dept: ORTHOPEDIC SURGERY | Age: 85
End: 2025-08-08

## (undated) DEVICE — SUT PROL 0 30IN CT1 BLU --

## (undated) DEVICE — SPONGE LAP 18X18IN STRL -- 5/PK

## (undated) DEVICE — SURGICAL PROCEDURE PACK BASIC ST FRANCIS

## (undated) DEVICE — NEEDLE HYPO 25GA L1.5IN BLU POLYPR HUB S STL REG BVL STR

## (undated) DEVICE — KENDALL SCD EXPRESS SLEEVES, KNEE LENGTH, MEDIUM: Brand: KENDALL SCD

## (undated) DEVICE — GUIDEWIRE WITH ICE™ HYDROPHILIC COATING: Brand: CHOICE™ PT

## (undated) DEVICE — TRAY CATH 16F URIN MTR LTX -- CONVERT TO ITEM 363111

## (undated) DEVICE — SUTURE PDS II SZ 1 L96IN ABSRB VLT TP-1 L65MM 1/2 CIR Z880G

## (undated) DEVICE — DRAPE,22X22,3"FENE W/7X7 REN: Brand: MEDLINE

## (undated) DEVICE — DEVICE COMPR REG 24 CM VASC BND

## (undated) DEVICE — BUTTON SWITCH PENCIL BLADE ELECTRODE, HOLSTER: Brand: EDGE

## (undated) DEVICE — GUIDEWIRE 035IN 210CM PTFE COAT FIX COR J TIP 15MM FIRM BODY

## (undated) DEVICE — (D)PREP SKN CHLRAPRP APPL 26ML -- CONVERT TO ITEM 371833

## (undated) DEVICE — CATHETER DIAG AD 5FR L110CM 145DEG COR GRY HYDRPHLC NYL

## (undated) DEVICE — GLIDESHEATH SLENDER STAINLESS STEEL KIT: Brand: GLIDESHEATH SLENDER

## (undated) DEVICE — CATHETER COR DIAG 4.0 5FR 110CM 2 SIDE H

## (undated) DEVICE — SOLUTION IV 1000ML 0.9% SOD CHL

## (undated) DEVICE — SLIM BODY SKIN STAPLER: Brand: APPOSE ULC

## (undated) DEVICE — REM POLYHESIVE ADULT PATIENT RETURN ELECTRODE: Brand: VALLEYLAB